# Patient Record
Sex: MALE | Race: WHITE | Employment: OTHER | URBAN - METROPOLITAN AREA
[De-identification: names, ages, dates, MRNs, and addresses within clinical notes are randomized per-mention and may not be internally consistent; named-entity substitution may affect disease eponyms.]

---

## 2017-02-10 ENCOUNTER — INITIAL CONSULT (OUTPATIENT)
Dept: SURGERY | Age: 82
End: 2017-02-10

## 2017-02-10 VITALS — HEIGHT: 71 IN | SYSTOLIC BLOOD PRESSURE: 188 MMHG | HEART RATE: 98 BPM | DIASTOLIC BLOOD PRESSURE: 120 MMHG

## 2017-02-10 DIAGNOSIS — Z86.73 HISTORY OF STROKE: ICD-10-CM

## 2017-02-10 DIAGNOSIS — I10 ESSENTIAL HYPERTENSION: ICD-10-CM

## 2017-02-10 DIAGNOSIS — R60.0 BILATERAL LEG EDEMA: Primary | ICD-10-CM

## 2017-02-10 DIAGNOSIS — E11.51 DIABETES MELLITUS WITH PERIPHERAL CIRCULATORY DISORDER (HCC): ICD-10-CM

## 2017-02-10 PROCEDURE — MISCLOD MISC IP SERVICE NONBILLABLE: Performed by: SURGERY

## 2017-02-17 ENCOUNTER — OFFICE VISIT (OUTPATIENT)
Dept: SURGERY | Age: 82
End: 2017-02-17

## 2017-02-17 ENCOUNTER — PROCEDURE VISIT (OUTPATIENT)
Dept: SURGERY | Age: 82
End: 2017-02-17

## 2017-02-17 VITALS — SYSTOLIC BLOOD PRESSURE: 144 MMHG | DIASTOLIC BLOOD PRESSURE: 80 MMHG

## 2017-02-17 DIAGNOSIS — I87.2 VENOUS INSUFFICIENCY (CHRONIC) (PERIPHERAL): Primary | ICD-10-CM

## 2017-02-17 DIAGNOSIS — E11.51 DIABETES MELLITUS WITH PERIPHERAL CIRCULATORY DISORDER (HCC): Primary | ICD-10-CM

## 2017-02-17 DIAGNOSIS — R60.0 LOCALIZED EDEMA: ICD-10-CM

## 2017-02-17 DIAGNOSIS — I10 ESSENTIAL HYPERTENSION: ICD-10-CM

## 2017-02-17 DIAGNOSIS — E11.51 DIABETES MELLITUS WITH PERIPHERAL CIRCULATORY DISORDER (HCC): ICD-10-CM

## 2017-02-17 PROCEDURE — 99213 OFFICE O/P EST LOW 20 MIN: CPT | Performed by: SURGERY

## 2017-02-17 PROCEDURE — 93970 EXTREMITY STUDY: CPT | Performed by: SURGERY

## 2017-02-17 PROCEDURE — 93925 LOWER EXTREMITY STUDY: CPT | Performed by: SURGERY

## 2017-02-17 ASSESSMENT — ENCOUNTER SYMPTOMS
GASTROINTESTINAL NEGATIVE: 1
RESPIRATORY NEGATIVE: 1

## 2017-04-10 PROBLEM — J44.9 COPD (CHRONIC OBSTRUCTIVE PULMONARY DISEASE) (HCC): Status: ACTIVE | Noted: 2017-04-10

## 2017-04-10 PROBLEM — E16.2 HYPOGLYCEMIA: Status: ACTIVE | Noted: 2017-04-10

## 2017-04-26 ENCOUNTER — OFFICE VISIT (OUTPATIENT)
Dept: INTERNAL MEDICINE CLINIC | Age: 82
End: 2017-04-26

## 2017-04-26 DIAGNOSIS — E11.41 CONTROLLED TYPE 2 DIABETES MELLITUS WITH DIABETIC MONONEUROPATHY, UNSPECIFIED LONG TERM INSULIN USE STATUS: ICD-10-CM

## 2017-04-26 DIAGNOSIS — R33.9 URINARY RETENTION: ICD-10-CM

## 2017-04-26 DIAGNOSIS — J43.9 PULMONARY EMPHYSEMA, UNSPECIFIED EMPHYSEMA TYPE (HCC): ICD-10-CM

## 2017-04-26 DIAGNOSIS — T83.510A URINARY TRACT INFECTION ASSOCIATED WITH CYSTOSTOMY CATHETER, INITIAL ENCOUNTER (HCC): ICD-10-CM

## 2017-04-26 DIAGNOSIS — N39.0 URINARY TRACT INFECTION ASSOCIATED WITH CYSTOSTOMY CATHETER, INITIAL ENCOUNTER (HCC): ICD-10-CM

## 2017-04-26 DIAGNOSIS — E16.2 HYPOGLYCEMIA: Primary | ICD-10-CM

## 2017-04-26 PROCEDURE — 99306 1ST NF CARE HIGH MDM 50: CPT | Performed by: INTERNAL MEDICINE

## 2017-05-17 ASSESSMENT — ENCOUNTER SYMPTOMS
RESPIRATORY NEGATIVE: 1
GASTROINTESTINAL NEGATIVE: 1
EYES NEGATIVE: 1
ALLERGIC/IMMUNOLOGIC NEGATIVE: 1

## 2018-09-19 ENCOUNTER — HOSPITAL ENCOUNTER (INPATIENT)
Dept: INPATIENT UNIT | Age: 83
LOS: 5 days | Discharge: NURSING, REHAB OR LONG-TERM CARE | DRG: 698 | End: 2018-09-24
Attending: EMERGENCY MEDICINE | Admitting: FAMILY MEDICINE
Payer: MEDICARE

## 2018-09-19 DIAGNOSIS — E86.0 DEHYDRATION: ICD-10-CM

## 2018-09-19 DIAGNOSIS — R41.0 DELIRIUM: ICD-10-CM

## 2018-09-19 DIAGNOSIS — N39.0 COMPLICATED UTI (URINARY TRACT INFECTION): Primary | ICD-10-CM

## 2018-09-19 DIAGNOSIS — J18.9 PNEUMONIA DUE TO ORGANISM: ICD-10-CM

## 2018-09-19 DIAGNOSIS — R50.9 FEVER, UNSPECIFIED FEVER CAUSE: ICD-10-CM

## 2018-09-19 PROBLEM — M00.9 SEPTIC ARTHRITIS (HCC): Status: ACTIVE | Noted: 2018-09-19

## 2018-09-19 LAB
A/G RATIO: 0.7 (ref 1.1–2.2)
ALBUMIN SERPL-MCNC: 2.5 G/DL (ref 3.4–5)
ALP BLD-CCNC: 150 U/L (ref 40–129)
ALT SERPL-CCNC: 53 U/L (ref 10–40)
ANION GAP SERPL CALCULATED.3IONS-SCNC: 8 MMOL/L (ref 3–16)
AST SERPL-CCNC: 62 U/L (ref 15–37)
BACTERIA: ABNORMAL /HPF
BASE EXCESS VENOUS: 6.2 MMOL/L
BASOPHILS ABSOLUTE: 0 K/UL (ref 0–0.2)
BASOPHILS RELATIVE PERCENT: 0 %
BETA-HYDROXYBUTYRATE: 0.24 MMOL/L (ref 0–0.27)
BILIRUB SERPL-MCNC: 0.8 MG/DL (ref 0–1)
BILIRUBIN URINE: NEGATIVE
BLOOD, URINE: ABNORMAL
BUN BLDV-MCNC: 32 MG/DL (ref 7–20)
CALCIUM SERPL-MCNC: 9.7 MG/DL (ref 8.3–10.6)
CARBOXYHEMOGLOBIN: 2.7 %
CHLORIDE BLD-SCNC: 95 MMOL/L (ref 99–110)
CLARITY: ABNORMAL
CO2: 30 MMOL/L (ref 21–32)
COLOR: YELLOW
COMMENT UA: ABNORMAL
CREAT SERPL-MCNC: 0.8 MG/DL (ref 0.8–1.3)
EOSINOPHILS ABSOLUTE: 0 K/UL (ref 0–0.6)
EOSINOPHILS RELATIVE PERCENT: 0 %
EPITHELIAL CELLS, UA: 0 /HPF (ref 0–5)
GFR AFRICAN AMERICAN: >60
GFR NON-AFRICAN AMERICAN: >60
GLOBULIN: 3.8 G/DL
GLUCOSE BLD-MCNC: 224 MG/DL (ref 70–99)
GLUCOSE BLD-MCNC: 247 MG/DL (ref 70–99)
GLUCOSE BLD-MCNC: 248 MG/DL (ref 70–99)
GLUCOSE BLD-MCNC: 268 MG/DL (ref 70–99)
GLUCOSE URINE: NEGATIVE MG/DL
HCO3 VENOUS: 31 MMOL/L (ref 23–29)
HCT VFR BLD CALC: 35 % (ref 40.5–52.5)
HEMOGLOBIN: 11.4 G/DL (ref 13.5–17.5)
HYALINE CASTS: 9 /LPF (ref 0–8)
KETONES, URINE: NEGATIVE MG/DL
LACTIC ACID: 1.1 MMOL/L (ref 0.4–2)
LEUKOCYTE ESTERASE, URINE: ABNORMAL
LYMPHOCYTES ABSOLUTE: 1.2 K/UL (ref 1–5.1)
LYMPHOCYTES RELATIVE PERCENT: 7 %
MCH RBC QN AUTO: 29.9 PG (ref 26–34)
MCHC RBC AUTO-ENTMCNC: 32.6 G/DL (ref 31–36)
MCV RBC AUTO: 91.6 FL (ref 80–100)
METHEMOGLOBIN VENOUS: 0.5 %
MICROSCOPIC EXAMINATION: YES
MONOCYTES ABSOLUTE: 0.7 K/UL (ref 0–1.3)
MONOCYTES RELATIVE PERCENT: 4 %
NEUTROPHILS ABSOLUTE: 15.5 K/UL (ref 1.7–7.7)
NEUTROPHILS RELATIVE PERCENT: 89 %
NITRITE, URINE: POSITIVE
O2 CONTENT, VEN: 16 ML/DL
O2 SAT, VEN: 99 %
O2 THERAPY: ABNORMAL
PCO2, VEN: 47.5 MMHG (ref 40–50)
PDW BLD-RTO: 14.8 % (ref 12.4–15.4)
PERFORMED ON: ABNORMAL
PH UA: 5.5
PH VENOUS: 7.43 (ref 7.35–7.45)
PLATELET # BLD: 286 K/UL (ref 135–450)
PLATELET SLIDE REVIEW: ADEQUATE
PMV BLD AUTO: 8.2 FL (ref 5–10.5)
PO2, VEN: 116 MMHG
POTASSIUM SERPL-SCNC: 4.3 MMOL/L (ref 3.5–5.1)
PROTEIN UA: 30 MG/DL
RBC # BLD: 3.81 M/UL (ref 4.2–5.9)
RBC # BLD: NORMAL 10*6/UL
RBC UA: 13 /HPF (ref 0–4)
SLIDE REVIEW: ABNORMAL
SODIUM BLD-SCNC: 133 MMOL/L (ref 136–145)
SPECIFIC GRAVITY UA: 1.02
TCO2 CALC VENOUS: 32 MMOL/L
TOTAL PROTEIN: 6.3 G/DL (ref 6.4–8.2)
URINE REFLEX TO CULTURE: YES
URINE TYPE: ABNORMAL
UROBILINOGEN, URINE: 1 E.U./DL
WBC # BLD: 17.4 K/UL (ref 4–11)
WBC UA: 176 /HPF (ref 0–5)

## 2018-09-19 PROCEDURE — G8979 MOBILITY GOAL STATUS: HCPCS

## 2018-09-19 PROCEDURE — 99285 EMERGENCY DEPT VISIT HI MDM: CPT

## 2018-09-19 PROCEDURE — 80053 COMPREHEN METABOLIC PANEL: CPT

## 2018-09-19 PROCEDURE — G8987 SELF CARE CURRENT STATUS: HCPCS

## 2018-09-19 PROCEDURE — 96365 THER/PROPH/DIAG IV INF INIT: CPT

## 2018-09-19 PROCEDURE — 85025 COMPLETE CBC W/AUTO DIFF WBC: CPT

## 2018-09-19 PROCEDURE — 87186 SC STD MICRODIL/AGAR DIL: CPT

## 2018-09-19 PROCEDURE — 96367 TX/PROPH/DG ADDL SEQ IV INF: CPT

## 2018-09-19 PROCEDURE — 97162 PT EVAL MOD COMPLEX 30 MIN: CPT

## 2018-09-19 PROCEDURE — 87086 URINE CULTURE/COLONY COUNT: CPT

## 2018-09-19 PROCEDURE — 97530 THERAPEUTIC ACTIVITIES: CPT

## 2018-09-19 PROCEDURE — 87801 DETECT AGNT MULT DNA AMPLI: CPT

## 2018-09-19 PROCEDURE — 97166 OT EVAL MOD COMPLEX 45 MIN: CPT

## 2018-09-19 PROCEDURE — G8978 MOBILITY CURRENT STATUS: HCPCS

## 2018-09-19 PROCEDURE — 82010 KETONE BODYS QUAN: CPT

## 2018-09-19 PROCEDURE — 83605 ASSAY OF LACTIC ACID: CPT

## 2018-09-19 PROCEDURE — 87040 BLOOD CULTURE FOR BACTERIA: CPT

## 2018-09-19 PROCEDURE — 9990 CHARGE CONVERSION

## 2018-09-19 PROCEDURE — 96361 HYDRATE IV INFUSION ADD-ON: CPT

## 2018-09-19 PROCEDURE — G8988 SELF CARE GOAL STATUS: HCPCS

## 2018-09-19 PROCEDURE — 97535 SELF CARE MNGMENT TRAINING: CPT

## 2018-09-19 PROCEDURE — 93010 ELECTROCARDIOGRAM REPORT: CPT | Performed by: INTERNAL MEDICINE

## 2018-09-19 PROCEDURE — 82803 BLOOD GASES ANY COMBINATION: CPT

## 2018-09-19 PROCEDURE — 81001 URINALYSIS AUTO W/SCOPE: CPT

## 2018-09-19 PROCEDURE — 70450 CT HEAD/BRAIN W/O DYE: CPT

## 2018-09-19 PROCEDURE — 87077 CULTURE AEROBIC IDENTIFY: CPT

## 2018-09-19 PROCEDURE — 93005 ELECTROCARDIOGRAM TRACING: CPT

## 2018-09-19 PROCEDURE — 71045 X-RAY EXAM CHEST 1 VIEW: CPT

## 2018-09-19 RX ORDER — NICOTINE POLACRILEX 4 MG
15 LOZENGE BUCCAL PRN
Status: DISCONTINUED | OUTPATIENT
Start: 2018-09-19 | End: 2018-09-24 | Stop reason: HOSPADM

## 2018-09-19 RX ORDER — OXYBUTYNIN CHLORIDE 5 MG/1
5 TABLET ORAL 3 TIMES DAILY
Status: DISCONTINUED | OUTPATIENT
Start: 2018-09-19 | End: 2018-09-24 | Stop reason: HOSPADM

## 2018-09-19 RX ORDER — POLYETHYLENE GLYCOL 3350 17 G/17G
17 POWDER, FOR SOLUTION ORAL DAILY
COMMUNITY

## 2018-09-19 RX ORDER — FUROSEMIDE 20 MG/1
20 TABLET ORAL DAILY
Status: DISCONTINUED | OUTPATIENT
Start: 2018-09-19 | End: 2018-09-24 | Stop reason: HOSPADM

## 2018-09-19 RX ORDER — 0.9 % SODIUM CHLORIDE 0.9 %
1000 INTRAVENOUS SOLUTION INTRAVENOUS ONCE
Status: COMPLETED | OUTPATIENT
Start: 2018-09-19 | End: 2018-09-19

## 2018-09-19 RX ORDER — LEVOTHYROXINE SODIUM 0.07 MG/1
75 TABLET ORAL DAILY
Status: DISCONTINUED | OUTPATIENT
Start: 2018-09-19 | End: 2018-09-24 | Stop reason: HOSPADM

## 2018-09-19 RX ORDER — ALBUTEROL SULFATE 2.5 MG/3ML
2.5 SOLUTION RESPIRATORY (INHALATION) EVERY 4 HOURS PRN
Status: DISCONTINUED | OUTPATIENT
Start: 2018-09-19 | End: 2018-09-24 | Stop reason: HOSPADM

## 2018-09-19 RX ORDER — ACETAMINOPHEN 325 MG/1
650 TABLET ORAL EVERY 4 HOURS PRN
Status: DISCONTINUED | OUTPATIENT
Start: 2018-09-19 | End: 2018-09-24 | Stop reason: HOSPADM

## 2018-09-19 RX ORDER — POLYETHYLENE GLYCOL 3350 17 G/17G
17 POWDER, FOR SOLUTION ORAL DAILY
Status: DISCONTINUED | OUTPATIENT
Start: 2018-09-19 | End: 2018-09-19

## 2018-09-19 RX ORDER — POTASSIUM CHLORIDE 1.5 G/1.77G
20 POWDER, FOR SOLUTION ORAL DAILY
Status: DISCONTINUED | OUTPATIENT
Start: 2018-09-19 | End: 2018-09-24 | Stop reason: HOSPADM

## 2018-09-19 RX ORDER — DEXTROSE MONOHYDRATE 25 G/50ML
12.5 INJECTION, SOLUTION INTRAVENOUS PRN
Status: DISCONTINUED | OUTPATIENT
Start: 2018-09-19 | End: 2018-09-24 | Stop reason: HOSPADM

## 2018-09-19 RX ORDER — INSULIN GLARGINE 100 [IU]/ML
25 INJECTION, SOLUTION SUBCUTANEOUS NIGHTLY
Status: DISCONTINUED | OUTPATIENT
Start: 2018-09-19 | End: 2018-09-24 | Stop reason: HOSPADM

## 2018-09-19 RX ORDER — DEXTROSE MONOHYDRATE 50 MG/ML
100 INJECTION, SOLUTION INTRAVENOUS PRN
Status: DISCONTINUED | OUTPATIENT
Start: 2018-09-19 | End: 2018-09-24 | Stop reason: HOSPADM

## 2018-09-19 RX ORDER — ASPIRIN 81 MG/1
81 TABLET, CHEWABLE ORAL DAILY
Status: DISCONTINUED | OUTPATIENT
Start: 2018-09-19 | End: 2018-09-24 | Stop reason: HOSPADM

## 2018-09-19 RX ORDER — SIMVASTATIN 10 MG
10 TABLET ORAL NIGHTLY
Status: DISCONTINUED | OUTPATIENT
Start: 2018-09-19 | End: 2018-09-24 | Stop reason: HOSPADM

## 2018-09-19 RX ORDER — SODIUM CHLORIDE 0.9 % (FLUSH) 0.9 %
10 SYRINGE (ML) INJECTION EVERY 12 HOURS SCHEDULED
Status: DISCONTINUED | OUTPATIENT
Start: 2018-09-19 | End: 2018-09-24 | Stop reason: HOSPADM

## 2018-09-19 RX ORDER — POLYETHYLENE GLYCOL 3350 17 G/17G
17 POWDER, FOR SOLUTION ORAL DAILY
Status: DISCONTINUED | OUTPATIENT
Start: 2018-09-19 | End: 2018-09-24 | Stop reason: HOSPADM

## 2018-09-19 RX ORDER — SODIUM CHLORIDE 0.9 % (FLUSH) 0.9 %
10 SYRINGE (ML) INJECTION PRN
Status: DISCONTINUED | OUTPATIENT
Start: 2018-09-19 | End: 2018-09-23 | Stop reason: SDUPTHER

## 2018-09-19 RX ORDER — ACETAMINOPHEN 650 MG/1
650 SUPPOSITORY RECTAL ONCE
Status: COMPLETED | OUTPATIENT
Start: 2018-09-19 | End: 2018-09-19

## 2018-09-19 RX ORDER — ONDANSETRON 2 MG/ML
4 INJECTION INTRAMUSCULAR; INTRAVENOUS EVERY 6 HOURS PRN
Status: DISCONTINUED | OUTPATIENT
Start: 2018-09-19 | End: 2018-09-24 | Stop reason: HOSPADM

## 2018-09-19 RX ORDER — LISINOPRIL 10 MG/1
10 TABLET ORAL DAILY
Status: DISCONTINUED | OUTPATIENT
Start: 2018-09-19 | End: 2018-09-24 | Stop reason: HOSPADM

## 2018-09-19 RX ADMIN — ENOXAPARIN SODIUM 40 MG: 40 INJECTION SUBCUTANEOUS at 17:31

## 2018-09-19 RX ADMIN — SIMVASTATIN 10 MG: 10 TABLET, FILM COATED ORAL at 22:19

## 2018-09-19 RX ADMIN — INSULIN LISPRO 4 UNITS: 100 INJECTION, SOLUTION INTRAVENOUS; SUBCUTANEOUS at 17:48

## 2018-09-19 RX ADMIN — OXYBUTYNIN CHLORIDE 5 MG: 5 TABLET ORAL at 22:20

## 2018-09-19 RX ADMIN — ACETAMINOPHEN 650 MG: 650 SUPPOSITORY RECTAL at 11:00

## 2018-09-19 RX ADMIN — Medication 10 ML: at 22:23

## 2018-09-19 RX ADMIN — OXYBUTYNIN CHLORIDE 5 MG: 5 TABLET ORAL at 17:32

## 2018-09-19 RX ADMIN — Medication 1000 ML: at 11:00

## 2018-09-19 RX ADMIN — Medication 1000 ML: at 12:50

## 2018-09-19 RX ADMIN — FUROSEMIDE 20 MG: 20 TABLET ORAL at 17:33

## 2018-09-19 RX ADMIN — ASPIRIN 81 MG 81 MG: 81 TABLET ORAL at 17:32

## 2018-09-19 RX ADMIN — POTASSIUM CHLORIDE 20 MEQ: 1.5 POWDER, FOR SOLUTION ORAL at 17:32

## 2018-09-19 RX ADMIN — INSULIN LISPRO 2 UNITS: 100 INJECTION, SOLUTION INTRAVENOUS; SUBCUTANEOUS at 22:21

## 2018-09-19 RX ADMIN — LISINOPRIL 10 MG: 10 TABLET ORAL at 17:32

## 2018-09-19 RX ADMIN — LEVOTHYROXINE SODIUM 75 MCG: 75 TABLET ORAL at 17:32

## 2018-09-19 RX ADMIN — INSULIN GLARGINE 25 UNITS: 100 INJECTION, SOLUTION SUBCUTANEOUS at 22:20

## 2018-09-19 RX ADMIN — POLYETHYLENE GLYCOL 3350 17 G: 17 POWDER, FOR SOLUTION ORAL at 17:31

## 2018-09-19 ASSESSMENT — PAIN SCALES - GENERAL: PAINLEVEL_OUTOF10: 0

## 2018-09-19 NOTE — PROGRESS NOTES
Medication Reconciliation     List of medications patient is currently taking is complete. Source of information: 1. Conversation with family at bedside                                       2. EPIC records      Allergies  Pcn [penicillins]; Sulfa antibiotics; and Ceftriaxone     Notes regarding home medications:   1. Patient received 4 units of his Humalog this morning with breakfast.     2. Patient has not had any of his other medications yet today.     Ruth Mccrary, 2019 PharmD Candidate  9/19/2018 11:49 AM

## 2018-09-19 NOTE — ED NOTES
Bed: B-10  Expected date: 9/19/18  Expected time: 10:20 AM  Means of arrival: [facility]  Comments:  Emmanuel De La Fuente, RN  09/19/18 1028

## 2018-09-19 NOTE — PAYOR INFORMATION
Patient Barbara Delgado:  [de-identified]  Primary AUTH/CERT:    [address] [facility]  Drive Name:   [facility]  Primary Insurance Plan Name:  [facility] [facility] HMO/POS  Primary Insurance Group Number:  [id number]  Primary Insurance Plan Type: [employer] Insurance Policy Number:  [id number]

## 2018-09-19 NOTE — PROGRESS NOTES
obstructive pulmonary disease) ([facility] [address] Diabetes mellitus ([facility] [address] ESBL (extended spectrum beta-lactamase) producing bacteria infection; Hyperlipidemia; Hypertension; Pneumonia; Schizophrenia ([facility] [address] Thyroid disease; and Urine retention. has a past surgical history that includes Prostatectomy; pr incise/drain bladder (Cystoscopy, placement suprapubic catheter); and Tonsillectomy. Restrictions  Restrictions/Precautions  Restrictions/Precautions: Fall Risk  Position Activity Restriction  Other position/activity restrictions: 6 recent falls; usually falls backward    Subjective  General  Chart Reviewed: Yes  Additional Pertinent Hx: Roiht Hunt PA-C, 9/19, \"Pt  is a 80 y.o. male who presents to the emergency department With his daughter for fever and altered mental status. The patient's daughter reports over the past week the patient has been declining. He has been generally weak and has had 3 falls. He did hit his head 3 days ago. There was no loss of consciousness. Suffered a hematoma to head which she has been applying ice. She has found his sugars to be in the 300s and at one point was greater than 600. She states he has had intermittent confusion. She has been trying to get him to come to the hospital and he reluctantly agreed today. He has a suprapubic catheter in place due to BPH and this was due for a change on 9/26. His daughter is concerned that his urine is dark and thick. He was recently treated for UTI a month ago, urine grew ESBL, he was treated with Levaquin at that time. He has not followed up with urology since then. \"  PMH includes anemia, BPH, cerebral infarction, COPD, DM, Schizophrenia, suprapubic catheter. Response To Previous Treatment: Not applicable  Family / Caregiver Present: Yes  Referring Practitioner: Dr. Scooby Davis  Referral Date : 09/19/18  Diagnosis: Septic arthritis  Subjective  Subjective: Pt very [place]. Agreeable to therapy evaluation. Daughter present.   Pain Screening  Patient Currently in Pain: Denies  Vital Signs  Patient Currently in Pain: Denies     Orientation  Orientation  Overall Orientation Status: Impaired  Orientation Level: Disoriented to time;Oriented to person    Social/Functional History  Social/Functional History  Lives With: Spouse  Type of Home: House  Home Layout: Two level, Performs ADL's on one level, Able to Live on Main level with bedroom/bathroom (Stair lift)  Home Access: Level entry  Bathroom Shower/Tub: Tub/Shower unit (With door; small step in)  Bathroom Toilet: Handicap height  Bathroom Equipment: Hand-held shower, Built-in shower seat  Bathroom Accessibility: Walker accessible  Home Equipment: Wheelchair-manual, Roll About, Rolling walker, Alert Button (1 walker on each level)  ADL Assistance:  (HHA assists with dressing as needed. Pt receives bath 1x/wk.  )  Homemaking Assistance:  (Pt fixes his own meals.  )  Ambulation Assistance:  (Mod I with RW over household distances)  Transfer Assistance: Independent  Active : No  Mode of Transportation: Family  Additional Comments: HHA comes 5 days a week. Daughter is with pt every day. Pt is alone with wife during nights. Pt has falleln twice in past week; 4 other falls recently.      Objective    AROM RLE (degrees)  RLE AROM: WFL  RLE General AROM: HIp Flex, Knee Flex/Ext, and Ankle PF/DF WFL  AROM LLE (degrees)  LLE AROM : WFL  LLE General AROM: HIp Flex, Knee Flex/Ext, and Ankle PF/DF WFL  AROM RUE (degrees)  RUE AROM : Exceptions  RUE General AROM: Shoulder Flex 90 (prior CVA), Elbow Flex/Ext WFL  AROM LUE (degrees)  LUE AROM : WFL  LUE General AROM: Shoulder Flex, Elbow Flex/Ext WFL     Strength RLE  Strength RLE: Exception  Comment: Hip Flex at least 3/5, Knee Ext 3/5, Ankle DF 3/5  Strength LLE  Strength LLE: WFL  Comment: Hip Flex, Knee Ext, and ANkle DF grossly 3+/5  Strength RUE  Strength RUE: WFL  Strength LUE  Strength LUE: WFL     Tone RLE  RLE Tone: Normotonic  Tone LLE  LLE Tone: Normotonic  Motor Control  Gross Motor?: WFL  Sensation  Overall Sensation Status: WFL     Bed mobility  Supine to Sit: Maximum assistance (HOB elevated; Max A for trunk control)  Sit to Supine: Maximum assistance (Max a x 2 for LE and trunk control)  Scooting: Moderate assistance    Transfers  Sit to Stand: Minimal Assistance (Min A to RW with elevated EOB)  Stand to sit: Minimal Assistance (Cues to sit (would not initially bend hips/knees))     Ambulation  Ambulation?: Yes  Ambulation 1  Surface: level tile  Device: Rolling Walker  Assistance: Minimal assistance  Quality of Gait: Pt required Min A initially to correct posterior LOB. After several steps, with cues to lean forward, he demonstrated improved trunk control, maintaining balance briefly with CGA. Pt became progressively fatigued, becoming less responsive to commands, then requiring Min A from PT to re-initiate forward ambulation, and control RW. Distance: 20'  Comments: Limited d/t fatigue     Balance  Comments: Pt able to maintain sitting balance at EOB with SBA. He denied dizziness or fatigue initially. Assessment   Body structures, Functions, Activity limitations: Decreased functional mobility ; Decreased strength;Decreased balance;Decreased endurance;Decreased safe awareness  Assessment: Pt is a 80 y.o. M. admitted 9/19 for UTI, multiple falls at home. He is very [facility], and disoriented to time, but agreeable to therapy evaluation. He demonstrated moderately decreased RUE/RLE strength (hx of CVA), and required Max A for bed mobility, and Min A to stand from elevated EOB to RW. He was limited in standing tolerance d/t fatigue, requiring Min A and significant cueing to navigate short distance in room with RW support. He would benefit from continued therapy to improve his strength, endurance, and independence with all mobility tasks. Recommend continued therapy at low-moderate frequency prior to pt returning home.     Treatment

## 2018-09-19 NOTE — H&P
merrem and azithromycin started in the ED, will continue  - check blood and sputum cultures  - lactate was normal  - supplemental O2, Nebs PRN  - mucinex     UTI, with a chronic suprapubic catheter  - UCx sent, previous with ESBL  - merrem started in the ED, will continue  - gentle IVF    Sepsis, POA  - with criteria: leukocytosis, fevers,  Tachypnea, AMS; source is PNA, UTI  - cultures, Abx as above  - IVF  - recheck lactate Q 6hrs until normalizes    Acute metabolic encephalopathy  - due to infectious causes, pna, uti  - head CT negative  - treat associated conditions  - avoid sedating meds as able  - supportive care    Diabetes  - stable  - hba1c   - hold home PO meds  - SSI, accuchecks    DVT Prophylaxis: lovenox  Diet:  dental soft  Code Status: DNR-CCA    Dispo - in pt       Mary Steven DO    Thank you Reinier Gaspar MD for the opportunity to be involved in this patient's care. If you have any questions or concerns please feel free to contact me at [phone].

## 2018-09-19 NOTE — ED NOTES
Report to everardo mueller.  Pt to be transferred to 2680 with transport     Maria G Ulloa RN  09/19/18 1237

## 2018-09-19 NOTE — PROGRESS NOTES
Occupational Therapy   Occupational Therapy Initial Assessment  See last progress note for discharge status. Date: 2018   Patient Name: Samantha Apgar  MRN: [id number]     : 1924    Date of Service: 2018    Discharge Recommendations:  Patient would benefit from continued therapy after discharge since he is below his normal level of balance, mobility and self care. Patient Diagnosis(es): The primary encounter diagnosis was Complicated UTI (urinary tract infection). Diagnoses of Pneumonia due to organism, Fever, unspecified fever cause, Delirium, and Dehydration were also pertinent to this visit. has a past medical history of Anemia; Arthritis; Benign prostate hyperplasia; Cerebral artery occlusion with cerebral infarction ([facility] [address] Chronic kidney disease; COPD (chronic obstructive pulmonary disease) ([facility] [address] Diabetes mellitus ([facility] [address] ESBL (extended spectrum beta-lactamase) producing bacteria infection; Hyperlipidemia; Hypertension; Pneumonia; Schizophrenia ([facility] [address] Thyroid disease; and Urine retention. has a past surgical history that includes Prostatectomy; pr incise/drain bladder (Cystoscopy, placement suprapubic catheter); and Tonsillectomy. Restrictions  Restrictions/Precautions  Restrictions/Precautions: Fall Risk  Position Activity Restriction  Other position/activity restrictions: 6 recent falls; usually falls backward    Subjective   General  Chart Reviewed:  Yes  Additional Pertinent Hx: DM, schizophrenia  Family / Caregiver Present: Yes (daughter, Adal Bradley)  Diagnosis: admitted 18 with falls, AMS, UTI and pneumonia  Subjective  Subjective: \"I'm OK\"  General Comment  Comments: Pt in bed, agreeable to therapy  Pain Assessment  Patient Currently in Pain: Denies    Height and Weight  Height: 5' 11.5\" (181.6 cm)  Social/Functional History  Social/Functional History  Lives With: Spouse  Type of Home: House  Home Layout: Two level, Performs ADL's on one level, Able to Live on Main level with bedroom/bathroom (Stair lift)  Home Access: Level entry  Bathroom Shower/Tub: Tub/Shower unit (With door; small step in)  Bathroom Toilet: Handicap height  Bathroom Equipment: Hand-held shower, Built-in shower seat  Bathroom Accessibility: Walker accessible  Home Equipment: Wheelchair-manual, Roll About, Rolling walker, Alert Button (1 walker on each level)  ADL Assistance:  (HHA assists with dressing as needed. Pt receives bath 1x/wk.  )  Homemaking Assistance:  (Pt fixes his own meals.  )  Ambulation Assistance:  (Mod I with RW over household distances)  Transfer Assistance: Independent  Active : No  Mode of Transportation: Family  Additional Comments: HHA comes 5 days a week. Daughter is with pt every day. Pt is alone with wife during nights. Pt has falleln twice in past week; 4 other falls recently. Objective   Vision: Within Functional Limits  Hearing: Exceptions to [facility]  Hearing Exceptions: Bilateral hearing aid    Orientation  Overall Orientation Status: Impaired  Orientation Level: Disoriented to time;Oriented to person     Standing Balance  Sit to stand: Minimal assistance (with height of bed, elevated)  Stand to sit: Minimal assistance  ADL  Feeding: Minimal assistance  Grooming: Moderate assistance  UE Dressing: Moderate assistance;Maximum assistance  LE Dressing: Maximum assistance  Toileting:  (supra pubic catheter)        Bed mobility  Supine to Sit: Maximum assistance  Sit to Supine: Maximum assistance  Transfers  Stand Step Transfers: Minimal assistance (with rolling walker, for around end of bed and back to other side of bed)  Sit to stand: Minimal assistance (with height of bed, elevated)  Stand to sit: Minimal assistance  Transfer Comments: Pt had difficulty following commands during some of the mobility. Comprehension seemed varied.      Cognition  Overall Cognitive Status: Exceptions  Arousal/Alertness: Delayed responses to stimuli;Inconsistent responses to stimuli  Following Commands: Follows one step commands with repetition  Attention Span: Attends with cues to redirect  Initiation: Requires cues for some  Sequencing: Requires cues for some        Sensation  Overall Sensation Status: WFL        LUE AROM (degrees)  LUE AROM : WFL  RUE AROM (degrees)  RUE AROM : WFL  RUE General AROM: except shoulder flexion to 90 degrees only (since old CVA)                 Assessment   Performance deficits / Impairments: Decreased functional mobility ; Decreased ADL status; Decreased cognition  Prognosis: Fair;Good  Decision Making: Medium Complexity  History: hx of CVA, DM, ESBL, suprapubic catheter, now with falls, UTI, PNA  Exam: self care, transfers  Assistance / Modification: assist, cues and walker/ equipment  REQUIRES OT FOLLOW UP: Yes  Activity Tolerance  Activity Tolerance: Patient limited by fatigue;Treatment limited secondary to medical complications (free text)  Safety Devices  Safety Devices in place: Yes  Type of devices: Nurse notified; Left in bed;Bed alarm in place;Call light within reach         Plan   Plan  Times per week: 3-5x  Current Treatment Recommendations: Patient/Caregiver Education & Training, Self-Care / ADL, Functional Mobility Training, Safety Education & Training    G-Code  OT G-codes  Functional Limitation: Self care  Self Care Current Status (): At least 60 percent but less than 80 percent impaired, limited or restricted  Self Care Goal Status (): At least 40 percent but less than 60 percent impaired, limited or restricted  OutComes Score  Eric Roach scored a 13/24 on the AM-PAC ADL Inpatient form. Current research shows that an AM-PAC score of 17 or less is typically not associated with a discharge to the patient's home setting. Based on the patients AM-PAC score and their current ADL deficits, it is recommended that the patient have 3-5 sessions per week of Occupational Therapy at d/c to increase the patients independence. AM-PAC Score        AM-PAC Inpatient Daily Activity Raw Score: 13  AM-PAC Inpatient ADL T-Scale Score : 32.03  ADL Inpatient CMS 0-100% Score: 63.03  ADL Inpatient CMS G-Code Modifier : CL    Goals  Short term goals  Time Frame for Short term goals: at d/c:  Short term goal 1: Set up for grooming  Short term goal 2: Minimal assist for bathing and dressing  Short term goal 3: SBA for bed mobility  Short term goal 4: SBA for transfers  Patient Goals   Patient goals : \"To eat\"       Therapy Time   Individual Concurrent Group Co-treatment   Time In 2406         Time Out 1613         Minutes 58               Patient Education: Call for needs and for assist to get up.     Rebecca Alexandre

## 2018-09-20 LAB
ANION GAP SERPL CALCULATED.3IONS-SCNC: 9 MMOL/L (ref 3–16)
BUN BLDV-MCNC: 23 MG/DL (ref 7–20)
CALCIUM SERPL-MCNC: 9.1 MG/DL (ref 8.3–10.6)
CHLORIDE BLD-SCNC: 103 MMOL/L (ref 99–110)
CO2: 29 MMOL/L (ref 21–32)
CREAT SERPL-MCNC: 0.7 MG/DL (ref 0.8–1.3)
GFR AFRICAN AMERICAN: >60
GFR NON-AFRICAN AMERICAN: >60
GLUCOSE BLD-MCNC: 109 MG/DL (ref 70–99)
GLUCOSE BLD-MCNC: 133 MG/DL (ref 70–99)
GLUCOSE BLD-MCNC: 168 MG/DL (ref 70–99)
GLUCOSE BLD-MCNC: 185 MG/DL (ref 70–99)
GLUCOSE BLD-MCNC: 196 MG/DL (ref 70–99)
HCT VFR BLD CALC: 32.9 % (ref 40.5–52.5)
HEMOGLOBIN: 10.8 G/DL (ref 13.5–17.5)
MAGNESIUM: 1.9 MG/DL (ref 1.8–2.4)
MCH RBC QN AUTO: 29.8 PG (ref 26–34)
MCHC RBC AUTO-ENTMCNC: 32.8 G/DL (ref 31–36)
MCV RBC AUTO: 91 FL (ref 80–100)
PDW BLD-RTO: 14.6 % (ref 12.4–15.4)
PERFORMED ON: ABNORMAL
PLATELET # BLD: 279 K/UL (ref 135–450)
PMV BLD AUTO: 7.6 FL (ref 5–10.5)
POTASSIUM REFLEX MAGNESIUM: 3.7 MMOL/L (ref 3.5–5.1)
RBC # BLD: 3.61 M/UL (ref 4.2–5.9)
REPORT: NORMAL
REPORT: NORMAL
SODIUM BLD-SCNC: 141 MMOL/L (ref 136–145)
WBC # BLD: 14.8 K/UL (ref 4–11)

## 2018-09-20 PROCEDURE — G8988 SELF CARE GOAL STATUS: HCPCS

## 2018-09-20 PROCEDURE — G8987 SELF CARE CURRENT STATUS: HCPCS

## 2018-09-20 PROCEDURE — 97110 THERAPEUTIC EXERCISES: CPT

## 2018-09-20 PROCEDURE — 99223 1ST HOSP IP/OBS HIGH 75: CPT | Performed by: INTERNAL MEDICINE

## 2018-09-20 PROCEDURE — 97116 GAIT TRAINING THERAPY: CPT

## 2018-09-20 PROCEDURE — 83735 ASSAY OF MAGNESIUM: CPT

## 2018-09-20 PROCEDURE — 87040 BLOOD CULTURE FOR BACTERIA: CPT

## 2018-09-20 PROCEDURE — 36415 COLL VENOUS BLD VENIPUNCTURE: CPT

## 2018-09-20 PROCEDURE — G8979 MOBILITY GOAL STATUS: HCPCS

## 2018-09-20 PROCEDURE — 9990 CHARGE CONVERSION

## 2018-09-20 PROCEDURE — 80048 BASIC METABOLIC PNL TOTAL CA: CPT

## 2018-09-20 PROCEDURE — 97535 SELF CARE MNGMENT TRAINING: CPT

## 2018-09-20 PROCEDURE — 92610 EVALUATE SWALLOWING FUNCTION: CPT

## 2018-09-20 PROCEDURE — G8978 MOBILITY CURRENT STATUS: HCPCS

## 2018-09-20 PROCEDURE — G8997 SWALLOW GOAL STATUS: HCPCS

## 2018-09-20 PROCEDURE — 85027 COMPLETE CBC AUTOMATED: CPT

## 2018-09-20 PROCEDURE — 94760 N-INVAS EAR/PLS OXIMETRY 1: CPT

## 2018-09-20 PROCEDURE — 97530 THERAPEUTIC ACTIVITIES: CPT

## 2018-09-20 PROCEDURE — G8996 SWALLOW CURRENT STATUS: HCPCS

## 2018-09-20 RX ADMIN — SIMVASTATIN 10 MG: 10 TABLET, FILM COATED ORAL at 20:33

## 2018-09-20 RX ADMIN — LEVOTHYROXINE SODIUM 75 MCG: 75 TABLET ORAL at 08:33

## 2018-09-20 RX ADMIN — INSULIN LISPRO 2 UNITS: 100 INJECTION, SOLUTION INTRAVENOUS; SUBCUTANEOUS at 11:59

## 2018-09-20 RX ADMIN — OXYBUTYNIN CHLORIDE 5 MG: 5 TABLET ORAL at 14:11

## 2018-09-20 RX ADMIN — OXYBUTYNIN CHLORIDE 5 MG: 5 TABLET ORAL at 09:04

## 2018-09-20 RX ADMIN — POTASSIUM CHLORIDE 20 MEQ: 1.5 POWDER, FOR SOLUTION ORAL at 09:04

## 2018-09-20 RX ADMIN — Medication 10 ML: at 20:53

## 2018-09-20 RX ADMIN — ENOXAPARIN SODIUM 40 MG: 40 INJECTION SUBCUTANEOUS at 09:04

## 2018-09-20 RX ADMIN — OXYBUTYNIN CHLORIDE 5 MG: 5 TABLET ORAL at 20:33

## 2018-09-20 RX ADMIN — INSULIN GLARGINE 25 UNITS: 100 INJECTION, SOLUTION SUBCUTANEOUS at 20:47

## 2018-09-20 RX ADMIN — Medication 10 ML: at 09:10

## 2018-09-20 RX ADMIN — ASPIRIN 81 MG 81 MG: 81 TABLET ORAL at 09:04

## 2018-09-20 RX ADMIN — POLYETHYLENE GLYCOL 3350 17 G: 17 POWDER, FOR SOLUTION ORAL at 09:04

## 2018-09-20 RX ADMIN — INSULIN LISPRO 1 UNITS: 100 INJECTION, SOLUTION INTRAVENOUS; SUBCUTANEOUS at 20:46

## 2018-09-20 RX ADMIN — MEROPENEM 500 MG: 500 INJECTION, POWDER, FOR SOLUTION INTRAVENOUS at 20:35

## 2018-09-20 RX ADMIN — INSULIN LISPRO 2 UNITS: 100 INJECTION, SOLUTION INTRAVENOUS; SUBCUTANEOUS at 18:46

## 2018-09-20 RX ADMIN — LISINOPRIL 10 MG: 10 TABLET ORAL at 09:04

## 2018-09-20 RX ADMIN — FUROSEMIDE 20 MG: 20 TABLET ORAL at 09:04

## 2018-09-20 ASSESSMENT — PAIN SCALES - GENERAL
PAINLEVEL_OUTOF10: 0

## 2018-09-20 NOTE — PLAN OF CARE
Problem: Falls - Risk of:  Goal: Absence of physical injury  Absence of physical injury     Outcome: Ongoing  Fall risk assessment completed every shift. All precautions in place. Pt has call light within reach at all times. Room clear of clutter. Tele cam in use to promote safety. Problem: Confusion - Acute:  Goal: Absence of continued neurological deterioration signs and symptoms    Absence of continued neurological deterioration signs and symptoms   Outcome: Ongoing  Tele cam in place to promote safety while acutely confused. Mental status assessed throughout the shift. Sleep promoted throughout the night by turning down lights, turning off TV, and limiting noise. Problem: Injury - Risk of, Physical Injury:  Goal: Absence of physical injury  Absence of physical injury     Outcome: Ongoing  Fall risk assessment completed every shift. All precautions in place. Pt has call light within reach at all times. Room clear of clutter. Tele cam in use to promote safety. Problem: Airway Clearance - Ineffective:  Goal: Clear lung sounds  Clear lung sounds  Outcome: Ongoing  Lungs being assessed each shift. Pt encouraged to C&DB throughout the shift. Fluid intake encouraged to liquify any secretions. Problem: Urinary Elimination:  Goal: Signs and symptoms of infection will decrease  Signs and symptoms of infection will decrease  Outcome: Ongoing  Suprapubic catheter care completed each shift. Antibiotics administered as prescribed. Urine characteristics monitored throughout the shift.

## 2018-09-20 NOTE — PROGRESS NOTES
cueing for safe hand placement, mod A with grab bars      Transfers  Sit to stand: Minimal assistance (with height of bed, elevated Min A x2)  Stand to sit: Minimal assistance  Transfer Comments: Pt then practiced several sit <> stand from recliner chair with min Ax1 to RW. Cognition  Overall Cognitive Status: Exceptions  Arousal/Alertness: Delayed responses to stimuli;Inconsistent responses to stimuli  Following Commands: Follows one step commands with repetition  Attention Span: Attends with cues to redirect  Initiation: Requires cues for some  Sequencing: Requires cues for some           Type of ROM/Therapeutic Exercise  Comment: Pt did complete x3 sit <> stands to maximize strength and independence for transfers. Assessment   Performance deficits / Impairments: Decreased functional mobility ; Decreased ADL status; Decreased cognition  Assessment: Pt tolerated session well this date, motivated to complete several walks in room. Pt completed toileting with min A for thoroughness of post pericare. Pt completed grooming at sink - tolerating ~ 4 minuts of standing activity. Pt is completing functional mobility with CGA with RW, steady with no LOB. Pt with one instance of postior LOB with min A to correct. Pt initially requiring min A x2 to stand from EOB, progressed to min A x1. Pt would benefit from continued skilled therapy to maximize strength and independence. Pt is unsafe to return home at this time d/t level of assistance currently required. Treatment Diagnosis: Decreased functional mobility ; Decreased ADL status; Decreased cognition;  Prognosis: Fair;Good  Exam: self care, transfers  Assistance / Modification: assist, cues and walker/ equipment  Patient Education: ADL retraining, safe transfers, func mob  REQUIRES OT FOLLOW UP: Yes  Activity Tolerance  Activity Tolerance: Patient Tolerated treatment well  Safety Devices  Safety Devices in place: Yes (RN (Denise) aware)  Type of devices: Call

## 2018-09-20 NOTE — CONSULTS
Infectious Diseases Inpatient Consult Note      Reason for Consult: Sepsis, high fevers     Requesting Physician:  Dr. Georgie Calderon     Primary Care Physician:  Elis Aguilar MD    History Obtained From:  Medical records and family    CHIEF COMPLAINT:     Chief Complaint   Patient presents with    Altered Mental Status     febrile. suprapubic catheter in place. dark urine noted in bag. unknown amt of time. daughter to ED, sts pt lives at home. 2 falls this week. reports lost balance. hit head 3 days ago. no loc        HISTORY OF PRESENT ILLNESS:  80 y.o. man lives in assisted living facility and has suprapubic catheter and h/o Urine infections in the past admitted with falls x 2 more confusion, leg swelling and dark urine and fevers . He has SPC change in ED and noted to have some drainage from the exit site UA very abnormal urine cx E coli and Blood cx E coli and coagulase negative staph, fever 102 on admit with elevation in WBC. H/o ESBL urine infection in the past. We are consulted for abx recommendations. Son at bed side has provided details.         Past Medical History:    Past Medical History:   Diagnosis Date    Anemia     Arthritis     Benign prostate hyperplasia     [facility]     Chronic kidney disease     COPD (chronic obstructive [facility] disease) [facility]     Diabetes mellitus ([facility]     II    ESBL (extended spectrum beta-lactamase) producing bacteria infection 08/12/2018    urine    Hyperlipidemia     Hypertension     Pneumonia     Schizophrenia ([facility]     Thyroid disease     hypo    Urine retention        Past Surgical History:    Past Surgical History:   Procedure Laterality Date    OH INCISE/DRAIN BLADDER  Cystoscopy, placement suprapubic catheter    Cystostomy, Suprapubic    PROSTATECTOMY      TONSILLECTOMY         Current Medications:    Outpatient Prescriptions Marked as Taking for the 9/19/18 encounter [facility] Encounter) with Mary Steven, DO   Medication Sig Dispense Refill    polyethylene glycol (GLYCOLAX) powder Take 17 g by mouth daily      insulin glargine (LANTUS) 100 UNIT/ML injection pen Inject 25 Units into the skin nightly 5 Pen 3    linagliptin (TRADJENTA) 5 MG tablet Take 1 tablet by mouth daily 30 tablet 0    furosemide (LASIX) 20 MG tablet Take 20 mg by mouth daily      insulin lispro (HUMALOG KWIKPEN) 100 UNIT/ML pen **Medium Dose Corrective Algorithm**  Glucose: Dose:  If <139             No Insulin  140-199 2 Units  200-249 4 Units  250-299 6 Units  300-349 8 Units  350-400 10 Units  Above 400       12 Units 5 Pen 3    temazepam (RESTORIL) 15 MG capsule Take 2 capsules by mouth nightly as needed for Sleep 30 capsule 5    lisinopril (PRINIVIL;ZESTRIL) 10 MG tablet Take 1 tablet by mouth daily 30 tablet 3    oxybutynin (DITROPAN) 5 MG tablet Take 5 mg by mouth 3 times daily      potassium chloride 20 MEQ/15ML (10%) oral solution Take 20 mEq by mouth daily      simvastatin (ZOCOR) 10 MG tablet Take 10 mg by mouth nightly      aspirin 81 MG chewable tablet Take 1 tablet by mouth daily. 30 tablet 0    levothyroxine (SYNTHROID) 75 MCG tablet Take 75 mcg by mouth Daily. Allergies:  Pcn [penicillins]; Sulfa antibiotics; and Ceftriaxone    Immunizations :   Immunization History   Administered Date(s) Administered    Influenza Virus Vaccine 11/10/2015         Social History:   Social History   Substance Use Topics    Smoking status: Former Smoker     Types: Cigarettes    Smokeless tobacco: Not on file    Alcohol use No     History   Smoking Status    Former Smoker    Types: Cigarettes   Smokeless Tobacco    Not on file          Family History : no DVT  no COPD       REVIEW OF SYSTEMS:    No fever / chills / sweats. No weight loss. No visual change, eye pain, eye discharge. No oral lesion, sore throat, dysphagia.   Denies cough / sputum/Sob   Denies chest pain, palpitations/ dizziness  Denies nausea/ MICRO: cultures reviewed and updated by me     20/18 1109       Order Status: Sent Specimen: Blood Updated: 09/20/18 1115   Culture Blood #1 [[id number]] Collected: 09/20/18 0956   Order Status: Sent Specimen: Blood from Blood Updated: 09/20/18 1003   Urine Culture [[id number]] (Abnormal) Collected: 09/19/18 1053   Order Status: Completed Specimen: Urine, clean catch Updated: 09/20/18 0909    Urine Culture, Routine --    Organism Gram negative ameena (A)    Urine Culture, Routine --    >100,000 CFU/ml   ID and sensitivity to follow    Narrative:     ORDER#: [id number]                          ORDERED BY: Mayito Eid  SOURCE: Urine Clean Catch                  COLLECTED:  09/19/18 10:53  ANTIBIOTICS AT DE. :                      RECEIVED :  09/19/18 11:11  Performed at:  [facility]  [address]   Phone [phone]   Culture, Blood PCR Report [[id number]] Collected: 09/19/18 1051   Order Status: Completed Updated: 09/20/18 0818    Report SEE IMAGE   Narrative:     Aaron Aguirre [id number],  Microbiology results called to and read back by Luis Swann RN [email or web address],  09/20/2018 08:17, by Thuy Coombs  Previous panic on this admission - call not needed per SOP, 09/20/2018 02:21,  by [facility]  Referred out by:  [facility]  [address] [facility] [employer] [facility] [address]   Phone [phone]   Culture Blood #2 [[id number]] (Abnormal) Collected: 09/19/18 1051   Order Status: Completed Specimen: Blood Whole Citrate Updated: 09/20/18 0817    Culture, Blood 2 -- (A)    Gram stain Anaerobic bottle:   Gram negative rods   Information to follow   Gram stain Aerobic bottle:   Gram positive cocci in clusters   resembling Staphylococcus   Information to follow     Organism Staphylococcus coagulase negative DNA Detected (A)    Culture, Blood 2 See additional report for complete BCID panel.    Narrative:     ORDER#: [id number]                          ORDERED BY: EILEEN LUTHER  SOURCE: Blood                              COLLECTED:  09/19/18 10:51  ANTIBIOTICS AT DE. :                      RECEIVED :  09/19/18 11:21  CALL  [facility]  [id number] tel. [phone],  Microbiology results called to and read back by Bogdan Pratt RN [email or web address],  09/20/2018 08:17, by Loco Stewart  Previous panic on this admission - call not needed per SOP, 09/20/2018 02:21,  by [facility]  If child <=2 yrs old please draw pediatric bottle. ~Blood Culture #2  Performed at:  [facility]  [address] [place] [address] [place], [place] [address]   Phone [phone]   Culture, Blood PCR Report [[id number]] Collected: 09/19/18 1053   Order Status: Completed Updated: 09/20/18 0221    Report SEE IMAGE   Narrative:     Sandra Mclainmarco a [id number],  Microbiology results called to and read back by Massimo Sheriff,  09/20/2018 02:20, by [facility]  Microbiology results called to and read back by CLARA Reyes, 09/20/2018  02:19, by [facility]  Referred out by:  [facility]  [address] [place], [place] [address]   Phone [phone]   Culture Blood #1 [[id number]] (Abnormal) Collected: 09/19/18 1053   Order Status: Completed Specimen: Blood from Blood Whole Citrate Updated: 09/20/18 0220    Blood Culture, Routine -- (A)    Gram stain Aerobic bottle:   Gram negative rods   Information to follow     Organism Escherichia coli DNA Detected (A)    Blood Culture, Routine See additional report for complete BCID panel. Narrative:     ORDER#: [id number]                          ORDERED BY: EILEEN Hernandez  SOURCE: Blood                              COLLECTED:  09/19/18 10:53  ANTIBIOTICS AT DE. :                      RECEIVED :  09/19/18 11:02  CALL  [facility]  [id number] tel. [phone],  Microbiology results called to and read back by Rx Rupa Sheriff,  09/20/2018 02:20, by [facility]  Microbiology results called to and read back by CLARA Reyes, 09/20/2018  02:19, by Susie Hoang  If child <=2 yrs old please draw pediatric bottle. ~Blood Culture #1  Performed at:  [facility]  [address] [facility] [address] [address]   Phone [phone]   Respiratory Culture [[id number]]    Order Status: No result Specimen: Sputum Induced        Urine Culture  Recent Labs      09/19/18   1053   LABURIN  >100,000 CFU/ml  ID and sensitivity to follow       Imaging:   CT Head WO Contrast   Final Result   No acute intracranial abnormality. XR CHEST PORTABLE   Final Result   Increased bibasilar airspace opacities which may be infectious. Consider   follow-up imaging to resolution. All pertinent images and reports for the current Hospitalization were reviewed by me. IMPRESSION:   Sepsis  Fevers,chills , confusion from above    source  E coli on urine cx   E coli bacteremia  Coagulase negative staph on Blood cx is a contaminant  Supra pubic catheter in place  Falls from ? Infection and sepsis  Dementia  Prostate enlargement    H/oUrinary retention    H/o MDRO - ESBL in the past       Labs, Microbiology, Radiology and pertinent results from current hospitalization and care every where were reviewed by me as a part of the consultation. PLAN :  1. Cont IV Meropenem reduce the dose to x 500 mg Q 8 hrs  2. D/C Azithromycin   3. Repeat Blood cx pending  4. Await full cx results   5. Cont supportive care      Discussed with patient/Family d/w RN d/w Son at bed side      Thanks for allowing me to participate in your patient's care please call me with any questions or concerns.     Dr. Micky Younger MD  [address] [place] [address] Physician  Phone: [phone]   Fax : [phone]

## 2018-09-20 NOTE — PROGRESS NOTES
this morning, but demonstrated improved independence ambulating short distance with RW support, CGA. He was unsteady while performing grooming tasks at sink, requiring intermittent Min A to correct for posterior LOB. He remains unsafe to return home d/t decreased balance and strength. Continue to recommend low-moderate frequency therapy upon D/C. Treatment Diagnosis: Decreased functional mobility; Decreased endurance  Specific instructions for Next Treatment: Practiced bed mobility and transfers  Prognosis: Fair;Good  Activity Tolerance  Activity Tolerance: Patient Tolerated treatment well;Patient limited by fatigue;Patient limited by endurance     G-Code  PT G-Codes  Functional Assessment Tool Used: [facility] Mobility Inpatient  Score: 16  Functional Limitation: Mobility: Walking and moving around  Mobility: Walking and Moving Around Current Status (): At least 40 percent but less than 60 percent impaired, limited or restricted  Mobility: Walking and Moving Around Goal Status (): At least 20 percent but less than 40 percent impaired, limited or restricted    AM-PAC Score  AM-PAC Inpatient Mobility Raw Score : 16  AM-PAC Inpatient T-Scale Score : 40.78  Mobility Inpatient CMS 0-100% Score: 54.16  Mobility Inpatient CMS G-Code Modifier : CK          Goals  Short term goals  Time Frame for Short term goals: In 2-3 days pt will perform - all goals ongoing 9/20  Short term goal 1: Bed mobility Mod A  Short term goal 2: Transfers from [address] chair with CGA  Short term goal 3: Ambulate 22' with CGA, RW support  Long term goals  Time Frame for Long term goals : LTG = STG  Patient Goals   Patient goals : \"To be able to eat again. \"    Plan    Plan  Times per week: 3-5x  Specific instructions for Next Treatment: Practiced bed mobility and transfers  Current Treatment Recommendations: Strengthening, Balance Training, Functional Mobility Training, Transfer Training, Gait Training, ADL/Self-care Training, Neuromuscular Re-education, Home Exercise Program, Safety Education & Training, Patient/Caregiver Education & Training, Equipment Evaluation, Education, & procurement  Safety Devices  Type of devices:  All fall risk precautions in place, Call light within reach, Chair alarm in place, Gait belt, Left in chair  Restraints  Initially in place: No     Therapy Time   Individual Concurrent Group Co-treatment   Time In       1030   Time Out       1100   Minutes       30   Timed Code Treatment Minutes: [address] [facility] Emily PT    Electronically signed by Venkata Pacheco, PT [id number] on 9/20/2018 at 11:15 AM

## 2018-09-20 NOTE — PROGRESS NOTES
metabolic encephalopathy associated with sepsis,UTI-- improved  -Diabetes mellitus type 2- controlled-on SSI      Plan  - consult ID to manage antibiotics. . Currently on meropenem and azithromycin  -Follow-up blood cultures, repeat cultures to document clearance  -Continue IV fluids  - swallowing evaluation      DVT Prophylaxis:  Lovenox  Diet: DIET CARB CONTROL;  Dental Soft  Code Status: DNR-CCA    PT/OT Eval Status:  ongoing    Dispo -   consulted- may need ECF    Nitin Jarvis MD

## 2018-09-20 NOTE — PROGRESS NOTES
mouth.       Impression  Dysphagia Diagnosis: Moderate oral stage dysphagia; Moderate pharyngeal stage dysphagia   · The pt was alert and upright in chair. Pt was confused. Dysarthric speech with R labial/lingual weakness. Son reports old CVA  · The pt demonstrated limited mastication with soft solids- pt appeared to swallow bites mostly whole. · On all other consistencies, the pt demonstrated prolonged bolus holding and reduced bolus control with suspected premature spillage. Reduced initiation for swallow. Delayed swallow. Reduced laryngeal elevation. Pt required tactile and verbal cues intermittently for swallow initiation. · No overt s/s following any consistencies. REC downgrade to DDI/puree and thin liquids. SLP for dysphagia tx and tolerance     Recommended Diet and Intervention  Diet Solids Recommendation: Dysphagia I Pureed  Liquid Consistency Recommendation: Thin  Recommended Form of Meds: Meds in puree  Therapeutic Interventions: Diet tolerance monitoring; Therapeutic PO trials with SLP;Patient/Family education    Compensatory Swallowing Strategies  Compensatory Swallowing Strategies: Eat/Feed slowly;Upright as possible for all oral intake;Small bites/sips;Assist feed     Treatment Plan  Requires SLP Intervention: Yes  Duration/Frequency of Treatment: 3-5x week on acute care floor  D/C Recommendations: To be determined  Dysphagia Outcome Severity Scale: Level 3: Moderate dysphagia- Total assisstance, supervision or strategies. Two or more diet consistencies restricted     Treatment/Goals  Dysphagia Goals: The patient will tolerate recommended diet without observed clinical signs of aspiration; The patient/caregiver will demonstrate understanding of compensatory strategies for improved swallowing safety. ;The patient will tolerate mechanical soft foods 10/10. General  Chart Reviewed: Yes  Behavior/Cognition: Alert;Confused; Cooperative; Requires cueing;Agitated;Distractible (slightly

## 2018-09-21 PROBLEM — E44.0 MODERATE MALNUTRITION (HCC): Status: ACTIVE | Noted: 2018-09-21

## 2018-09-21 LAB
ANION GAP SERPL CALCULATED.3IONS-SCNC: 8 MMOL/L (ref 3–16)
BASOPHILS ABSOLUTE: 0 K/UL (ref 0–0.2)
BASOPHILS RELATIVE PERCENT: 0.2 %
BUN BLDV-MCNC: 22 MG/DL (ref 7–20)
CALCIUM SERPL-MCNC: 9.2 MG/DL (ref 8.3–10.6)
CHLORIDE BLD-SCNC: 99 MMOL/L (ref 99–110)
CO2: 31 MMOL/L (ref 21–32)
CREAT SERPL-MCNC: 0.7 MG/DL (ref 0.8–1.3)
EKG ATRIAL RATE: 99 BPM
EKG DIAGNOSIS: NORMAL
EKG P AXIS: 54 DEGREES
EKG P-R INTERVAL: 162 MS
EKG Q-T INTERVAL: 324 MS
EKG QRS DURATION: 84 MS
EKG QTC CALCULATION (BAZETT): 415 MS
EKG R AXIS: 7 DEGREES
EKG T AXIS: 18 DEGREES
EKG VENTRICULAR RATE: 99 BPM
EOSINOPHILS ABSOLUTE: 0.2 K/UL (ref 0–0.6)
EOSINOPHILS RELATIVE PERCENT: 1.3 %
GFR AFRICAN AMERICAN: >60
GFR NON-AFRICAN AMERICAN: >60
GLUCOSE BLD-MCNC: 114 MG/DL (ref 70–99)
GLUCOSE BLD-MCNC: 159 MG/DL (ref 70–99)
GLUCOSE BLD-MCNC: 214 MG/DL (ref 70–99)
GLUCOSE BLD-MCNC: 217 MG/DL (ref 70–99)
GLUCOSE BLD-MCNC: 92 MG/DL (ref 70–99)
HCT VFR BLD CALC: 34.4 % (ref 40.5–52.5)
HEMOGLOBIN: 11.3 G/DL (ref 13.5–17.5)
LYMPHOCYTES ABSOLUTE: 1 K/UL (ref 1–5.1)
LYMPHOCYTES RELATIVE PERCENT: 6.9 %
MCH RBC QN AUTO: 30.4 PG (ref 26–34)
MCHC RBC AUTO-ENTMCNC: 32.8 G/DL (ref 31–36)
MCV RBC AUTO: 92.5 FL (ref 80–100)
MONOCYTES ABSOLUTE: 1.4 K/UL (ref 0–1.3)
MONOCYTES RELATIVE PERCENT: 9.9 %
NEUTROPHILS ABSOLUTE: 11.2 K/UL (ref 1.7–7.7)
NEUTROPHILS RELATIVE PERCENT: 81.7 %
ORGANISM: ABNORMAL
PDW BLD-RTO: 14.9 % (ref 12.4–15.4)
PERFORMED ON: ABNORMAL
PERFORMED ON: NORMAL
PLATELET # BLD: 319 K/UL (ref 135–450)
PMV BLD AUTO: 7.8 FL (ref 5–10.5)
POTASSIUM REFLEX MAGNESIUM: 3.8 MMOL/L (ref 3.5–5.1)
RBC # BLD: 3.72 M/UL (ref 4.2–5.9)
SODIUM BLD-SCNC: 138 MMOL/L (ref 136–145)
URINE CULTURE, ROUTINE: ABNORMAL
URINE CULTURE, ROUTINE: ABNORMAL
WBC # BLD: 13.7 K/UL (ref 4–11)

## 2018-09-21 PROCEDURE — G8979 MOBILITY GOAL STATUS: HCPCS

## 2018-09-21 PROCEDURE — 92526 ORAL FUNCTION THERAPY: CPT

## 2018-09-21 PROCEDURE — 94760 N-INVAS EAR/PLS OXIMETRY 1: CPT

## 2018-09-21 PROCEDURE — 85025 COMPLETE CBC W/AUTO DIFF WBC: CPT

## 2018-09-21 PROCEDURE — 97530 THERAPEUTIC ACTIVITIES: CPT

## 2018-09-21 PROCEDURE — 99233 SBSQ HOSP IP/OBS HIGH 50: CPT | Performed by: INTERNAL MEDICINE

## 2018-09-21 PROCEDURE — 9990 CHARGE CONVERSION

## 2018-09-21 PROCEDURE — 36415 COLL VENOUS BLD VENIPUNCTURE: CPT

## 2018-09-21 PROCEDURE — G8978 MOBILITY CURRENT STATUS: HCPCS

## 2018-09-21 PROCEDURE — 80048 BASIC METABOLIC PNL TOTAL CA: CPT

## 2018-09-21 RX ADMIN — OXYBUTYNIN CHLORIDE 5 MG: 5 TABLET ORAL at 09:11

## 2018-09-21 RX ADMIN — MEROPENEM 500 MG: 500 INJECTION, POWDER, FOR SOLUTION INTRAVENOUS at 21:45

## 2018-09-21 RX ADMIN — SIMVASTATIN 10 MG: 10 TABLET, FILM COATED ORAL at 21:45

## 2018-09-21 RX ADMIN — OXYBUTYNIN CHLORIDE 5 MG: 5 TABLET ORAL at 21:45

## 2018-09-21 RX ADMIN — LISINOPRIL 10 MG: 10 TABLET ORAL at 09:11

## 2018-09-21 RX ADMIN — POTASSIUM CHLORIDE 20 MEQ: 1.5 POWDER, FOR SOLUTION ORAL at 09:11

## 2018-09-21 RX ADMIN — ENOXAPARIN SODIUM 40 MG: 40 INJECTION SUBCUTANEOUS at 09:11

## 2018-09-21 RX ADMIN — LEVOTHYROXINE SODIUM 75 MCG: 75 TABLET ORAL at 06:03

## 2018-09-21 RX ADMIN — MEROPENEM 500 MG: 500 INJECTION, POWDER, FOR SOLUTION INTRAVENOUS at 04:36

## 2018-09-21 RX ADMIN — OXYBUTYNIN CHLORIDE 5 MG: 5 TABLET ORAL at 12:26

## 2018-09-21 RX ADMIN — ASPIRIN 81 MG 81 MG: 81 TABLET ORAL at 09:11

## 2018-09-21 RX ADMIN — INSULIN LISPRO 4 UNITS: 100 INJECTION, SOLUTION INTRAVENOUS; SUBCUTANEOUS at 17:20

## 2018-09-21 RX ADMIN — Medication 10 ML: at 09:12

## 2018-09-21 RX ADMIN — MEROPENEM 500 MG: 500 INJECTION, POWDER, FOR SOLUTION INTRAVENOUS at 11:39

## 2018-09-21 RX ADMIN — INSULIN LISPRO 2 UNITS: 100 INJECTION, SOLUTION INTRAVENOUS; SUBCUTANEOUS at 21:50

## 2018-09-21 RX ADMIN — Medication 10 ML: at 21:45

## 2018-09-21 RX ADMIN — INSULIN GLARGINE 25 UNITS: 100 INJECTION, SOLUTION SUBCUTANEOUS at 21:50

## 2018-09-21 RX ADMIN — FUROSEMIDE 20 MG: 20 TABLET ORAL at 09:11

## 2018-09-21 ASSESSMENT — PAIN SCALES - GENERAL
PAINLEVEL_OUTOF10: 0
PAINLEVEL_OUTOF10: 0

## 2018-09-21 NOTE — CARE COORDINATION
Jose Vargas prefers [facility] [place].  left for Olga at [facility] regarding bed availability. Spoke with [place] at [place]. [facility]. Will check benefits. One bed available. Will continue to follow for discharge planning.      Electronically signed by SHAZIA Correa, [email or web address] on 9/21/2018 at 1:05 PM

## 2018-09-21 NOTE — CONSULTS
Nutrition Assessment    Type and Reason for Visit: Initial, Consult (supplement recommendations)    Nutrition Recommendations:   Continue glucerna tid    Malnutrition Assessment:  · Malnutrition Status: Meets the criteria for moderate malnutrition  · Context: Acute illness or injury  · Findings of the 6 clinical characteristics of malnutrition (Minimum of 2 out of 6 clinical characteristics is required to make the diagnosis of moderate or severe Protein Calorie Malnutrition based on AND/ASPEN Guidelines):  1. Energy Intake-Less than or equal to 75%, greater than or equal to 5 days    2. Weight Loss-5% loss or greater, in 1 month  3. Fat Loss-Unable to assess,    4. Muscle Loss-Unable to assess,    5. Fluid Accumulation-Moderate to severe fluid accumulation, Extremities  6.  Strength-Not measured    Nutrition Diagnosis:   · Problem: Inadequate oral intake  · Etiology: related to Insufficient energy/nutrient consumption     Signs and symptoms:  as evidenced by Weight loss, Diet history of poor intake    Nutrition Assessment:  · Subjective Assessment: Pt admitted with altered mental status, fever & increasing weakness. Found to have uti with sepsis. PMH includes; suprapubic catheter, CVA, [place], CKD, COPD, DM, HLD, HTN, Schizophrenia. Diet advanced to [facility]. Swallowing evaluated on 9/20 & pt found safest with Dysphagia l texture. Glucerna added tid to start r/t reports of decreased po. Pt not available & no family present, so information taken from medical record. Po records reflect variable but improving intake at 1-75%. Noted current plan is for ECF at discharge. · Nutrition-Focused Physical Findings: BM 9/21. Glycolax on board. Pt -2.5 liters. Noted +2 edema to lower extremities.   · Wound Type: Skin Tears (right forearm. )  · Current Nutrition Therapies:  · Oral Diet Orders: Carb Control 4 Carbs/Meal, Dysphagia 1 (Pureed)   · Oral Diet intake: 1-25%, 51-75%  · Oral Nutrition Supplement (ONS) Orders: Diabetic

## 2018-09-21 NOTE — PROGRESS NOTES
Physical Therapy  Progress Note  Pt agreeable to afternoon session. He initially required Max a to stand from bedside chair to RW. Once standing, pt ambulated very slowly with RW support, requiring intermittent cueing for navigation, and to re-initiate ambulation, but did well to maintain balance with CGA-Min A. Pt took seated rest after 30' of ambulation, then required Min A to stand from bedside chair to RW. He pivoted to bed with Min A, then required Mod A to move to supine (LE control). With cues for technique, Min A for LE stabilization, and bed in trendellenberg position, pt able to scoot himself toward [facility]. He was positioned for comfort with HOB elevated, alarm in place, call light in reach. Time In: 1355  Time coded Tx: 24 min.     Electronically signed by Abad James, PT [id number] on 9/21/2018 at 2:30 PM

## 2018-09-21 NOTE — PROGRESS NOTES
Physical Therapy  Facility/Department: [address] PROGRESSIVE  Daily Treatment Note  This note serves as D/C summary if patient is discharged prior to next treatment session. Assessment: Pt required Max A x 2 to move supine > sit, but was able to stand from elevated EOB with Min A, then pivot to bedside chair with CGA. His ability to complete mobility tasks independently remains limited d/t LE weakness. He would benefit from continued therapy to improve his strength, and activity tolerance. Continue to recommend low-moderate frequency therapy upon D/C. Maryjo Mallory scored a 16/24 on the AM-PAC short mobility form. Current research shows that an AM-PAC score of 17 or less is typically not associated with a discharge to the patient's home setting. Based on the patients AM-PAC score and their current functional mobility deficits, it is recommended that the patient have 3-5 sessions per week of Physical Therapy at d/c to increase the patients independence. NAME: Maryjo Mallory  : 1924  MRN: [id number]    Date of Service: 2018    Discharge Recommendations:  Patient would benefit from continued therapy after discharge, Continue to assess pending progress   PT Equipment Recommendations  Equipment Needed: No    Patient Diagnosis(es): The primary encounter diagnosis was Complicated UTI (urinary tract infection). Diagnoses of Pneumonia due to organism, Fever, unspecified fever cause, Delirium, and Dehydration were also pertinent to this visit. has a past medical history of Anemia; Arthritis; Benign prostate hyperplasia; Cerebral artery occlusion with cerebral infarction ([address] Chronic kidney disease; COPD (chronic obstructive pulmonary disease) ([address] Diabetes mellitus ([address] ESBL (extended spectrum beta-lactamase) producing bacteria infection; Hyperlipidemia; Hypertension; Pneumonia; Schizophrenia ([address] Thyroid disease; and Urine retention.    has a past surgical history that includes Treatment: Practiced bed mobility and transfers  Prognosis: Fair;Good  Activity Tolerance  Activity Tolerance: Patient Tolerated treatment well;Patient limited by fatigue;Patient limited by endurance     G-Code  PT G-Codes  Functional Assessment Tool Used: [facility] mobility Inpatient  Score: 15  Functional Limitation: Mobility: Walking and moving around  Mobility: Walking and Moving Around Current Status (): At least 40 percent but less than 60 percent impaired, limited or restricted  Mobility: Walking and Moving Around Goal Status (): At least 20 percent but less than 40 percent impaired, limited or restricted    AM-PAC Score  AM-PAC Inpatient Mobility Raw Score : 16  AM-PAC Inpatient T-Scale Score : 40.78  Mobility Inpatient CMS 0-100% Score: 54.16  Mobility Inpatient CMS G-Code Modifier : CK          Goals  Short term goals  Time Frame for Short term goals: In 2-3 days pt will perform - all goals ongoing 9/20  Short term goal 1: Bed mobility Mod A  Short term goal 2: Transfers from [address] chair with CGA  Short term goal 3: Ambulate 22' with CGA, RW support  Long term goals  Time Frame for Long term goals : LTG = STG  Patient Goals   Patient goals : \"To be able to eat again. \"    Plan    Plan  Times per week: 3-5x  Specific instructions for Next Treatment: Practiced bed mobility and transfers  Current Treatment Recommendations: Strengthening, Balance Training, Functional Mobility Training, Transfer Training, Gait Training, ADL/Self-care Training, Neuromuscular Re-education, Home Exercise Program, Safety Education & Training, Patient/Caregiver Education & Training, Equipment Evaluation, Education, & procurement  Safety Devices  Type of devices:  All fall risk precautions in place, Call light within reach, Chair alarm in place, Gait belt, Left in chair  Restraints  Initially in place: No     Therapy Time   Individual Concurrent Group Co-treatment   Time In       1035   Time Out       1050   Minutes

## 2018-09-21 NOTE — PROGRESS NOTES
Component Value Date    ALKPHOS 150 09/19/2018    ALT 53 09/19/2018    AST 62 09/19/2018    PROT 6.3 09/19/2018    PROT 7.4 12/25/2012    BILITOT 0.8 09/19/2018    BILIDIR 0.3 04/09/2017    IBILI 0.9 04/09/2017    LABALBU 2.5 09/19/2018       UA:  Lab Results   Component Value Date    COLORU YELLOW 09/19/2018    CLARITYU CLOUDY 09/19/2018    GLUCOSEU Negative 09/19/2018    BILIRUBINUR Negative 09/19/2018    KETUA Negative 09/19/2018    SPECGRAV 1.017 09/19/2018    BLOODU LARGE 09/19/2018    PHUR 5.5 09/19/2018    PROTEINU 30 09/19/2018    UROBILINOGEN 1.0 09/19/2018    NITRU POSITIVE 09/19/2018    LEUKOCYTESUR LARGE 09/19/2018    LABMICR YES 09/19/2018    URINETYPE Not Specified 09/19/2018      Urine Microscopic:   Lab Results   Component Value Date    LABCAST 0-1 Hyaline 04/09/2017    BACTERIA 4+ 09/19/2018    COMU see below 09/19/2018    HYALCAST 9 09/19/2018    WBCUA 176 09/19/2018    RBCUA 13 09/19/2018    EPIU 0 09/19/2018       MICRO: cultures reviewed and updated by me          Culture Blood #2 [[id number]] (Abnormal) Collected: 09/19/18 1051   Order Status: Completed Specimen: Blood Whole Citrate Updated: 09/21/18 0947    Culture, Blood 2 -- (A)    Gram stain Anaerobic bottle:   Gram negative rods   Information to follow   Gram stain Aerobic bottle:   Gram positive cocci in clusters   resembling Staphylococcus   Information to follow     Organism Staphylococcus coagulase negative DNA Detected (A)    Culture, Blood 2 See additional report for complete BCID panel. Organism Escherichia coli (A)    Culture, Blood 2 --    POSITIVE for   No further workup   Isolated one out of two bottles   Refer to ([id number]) for sensitivity results     Organism Staphylococcus coagulase-negative (A)    Culture, Blood 2 --    POSITIVE for   This organism was isolated from one bottle only. Susceptibility testing is not routinely done as this   organism frequently represents skin contamination.    Additional testing can be catheter in place  Falls from ? Infection and sepsis  Dementia  Prostate enlargement    H/o Urinary retention    H/o MDRO - ESBL in the past         Labs, Microbiology, Radiology and all the pertinent results from current hospitalization and  care every where were reviewed  by me as a part of the evaluation   Plan:   1. Cont IV Meropenem x 500 mg Q 8 hrs  2. Follow up blood cx pending  3. Will be able to choose oral abx when ready and sepsis improved  4. No PICC line necessary   5. Watch for complications     Discussed with patient/Family d/w RN     Thanks for allowing me to participate in your patient's care and please call me with any questions or concerns.     Mary Grace Giron MD  Infectious Disease  [facility] [place] ([facility]) Physician  Phone: [phone]   Fax : [phone]

## 2018-09-21 NOTE — PROGRESS NOTES
swallow required occasionally. No overt s/s. Dysphagia Tx:   See above    Goals:   Dysphagia Goals: The patient will tolerate recommended diet without observed clinical signs of aspiration, -ongoing   The patient/caregiver will demonstrate understanding of compensatory strategies for improved swallowing safety. , -ongoing   The patient will tolerate mechanical soft foods 10/10. -NA    Assessment:   Impressions:   Dysphagia Diagnosis: Moderate oral stage dysphagia, Moderate pharyngeal stage dysphagia   Pt appears similar to eval.   Pt tolerated thin liquids via straw as well as puree consistencies without overt s/s ; however pt refused to hold cup himself and SLP had to feed pt. Improved bolus transit and timing of swallow with liquids; however continues with prolonged bolus holding with very delayed swallow initiation on puree. Cues to initiate swallow required occasionally. REC continue on diet as ordered.    SLP to continue to follow for dysphagia tx     Diet Recommendations:  Dysphagia I (puree)  Thin liquids   Recommended Form of Meds: Meds in puree    Strategies:   Compensatory Swallowing Strategies: Eat/Feed slowly, Upright as possible for all oral intake, Small bites/sips, Assist feed    Education:  Consulted and agree with results and recommendations: Patient, RN  Patient Education: pt's recommendation and goals   Patient Education Response: Needs reinforcement, No evidence of learning    Prognosis:   fair    Plan:     Continue Dysphagia Therapy: yes  Interventions: Therapeutic Interventions: Diet tolerance monitoring, Therapeutic PO trials with SLP, Patient/Family education  Duration/Frequency of therapy while on unit: Duration/Frequency of Treatment  Duration/Frequency of Treatment: 3-5x week on acute care floor  Discharge Instructions:   Anticipate TBD for further skilled Speech Therapy for Dysphagia at discharge    This note serves as a D/C Summary in the event that this patient is discharged

## 2018-09-21 NOTE — PROGRESS NOTES
clubbing, cyanosis or edema bilaterally. Full range of motion without deformity. Skin: Skin color, texture, turgor normal.  No rashes or lesions. Neurologic:  Neurovascularly intact without any focal sensory/motor deficits. Cranial nerves: II-XII intact, grossly non-focal.  Psychiatric: Alert and oriented, thought content appropriate, normal insight  Capillary Refill: Brisk,< 3 seconds   Peripheral Pulses: +2 palpable, equal bilaterally       Labs:   Recent Labs      09/19/18   1053  09/20/18   0603  09/21/18   0658   WBC  17.4*  14.8*  13.7*   HGB  11.4*  10.8*  11.3*   HCT  35.0*  32.9*  34.4*   PLT  286  279  319     Recent Labs      09/19/18   1053  09/20/18   0603  09/21/18   0658   NA  133*  141  138   K  4.3  3.7  3.8   CL  95*  103  99   CO2  30  29  31   BUN  32*  23*  22*   CREATININE  0.8  0.7*  0.7*   CALCIUM  9.7  9.1  9.2     Recent Labs      09/19/18   1053   AST  62*   ALT  53*   BILITOT  0.8   ALKPHOS  150*     No results for input(s): INR in the last 72 hours. No results for input(s): Juan Pablo Alexander in the last 72 hours. Urinalysis:      Lab Results   Component Value Date    NITRU POSITIVE 09/19/2018    WBCUA 176 09/19/2018    BACTERIA 4+ 09/19/2018    RBCUA 13 09/19/2018    BLOODU LARGE 09/19/2018    SPECGRAV 1.017 09/19/2018    GLUCOSEU Negative 09/19/2018       Radiology:  CT Head WO Contrast   Final Result   No acute intracranial abnormality. XR CHEST PORTABLE   Final Result   Increased bibasilar airspace opacities which may be infectious. Consider   follow-up imaging to resolution. Assessment/Plan:    -sepsis due to UTI, bacteremia  -Complicated  E coli UTI associated with chronic indwelling  suprapubic catheter. . hx ESBL. . Catheter exchanged t9/20 by urology  -E. Coli bacteremia dt UTI. .CoNS is a contaminant  -bibasilar infiltrates-dt atelectasis,les likely pneumonia  -acute metabolic encephalopathy associated with sepsis,UTI-- improved  -Diabetes mellitus type 2- controlled-on SSI  -Moderate oral pharyngeal dysphagia-on modifed diet      Plan  - c/w meropenem-managed by ID . .. Azithromycin dc'd  -Follow-up on blood cultures,  - IV fluids dc'd        DVT Prophylaxis:  Lovenox  Diet: Dietary Nutrition Supplements: Diabetic Oral Supplement  DIET CARB CONTROL;  Dysphagia I Pureed  Code Status: DNR-CCA    PT/OT Eval Status:  ongoing    Dispo -   consulted for possible ECF    Nitin Jarvis MD

## 2018-09-21 NOTE — PROGRESS NOTES
services while in acute care, [facility] score indicates non homebound dc. Treatment Diagnosis: Decreased functional mobility ; Decreased ADL status; Decreased cognition;  Prognosis: Fair;Good  History: hx of CVA, DM, ESBL, suprapubic catheter, now with falls, UTI, PNA  Exam: self care, transfers  Assistance / Modification: assist, cues and walker/ equipment  Patient Education: safe hand placement during transfers  REQUIRES OT FOLLOW UP: Yes  Activity Tolerance  Activity Tolerance: Patient Tolerated treatment well  Safety Devices  Safety Devices in place: Yes  Type of devices: Call light within reach; Chair alarm in place; Left in chair;Gait belt;Nurse notified          Plan   Plan  Times per week: 3-5x  Current Treatment Recommendations: Patient/Caregiver Education & Training, Self-Care / ADL, Functional Mobility Training, Safety Education & Training  AM-PAC Score        AM-PAC Inpatient Daily Activity Raw Score: 14  AM-PAC Inpatient ADL T-Scale Score : 33.39  ADL Inpatient CMS 0-100% Score: 59.67  ADL Inpatient CMS G-Code Modifier : CK    Goals  Short term goals  Time Frame for Short term goals: at d/c: status on going   Short term goal 1: Set up for grooming  Short term goal 2: Minimal assist for bathing and dressing  Short term goal 3: SBA for bed mobility  Short term goal 4: SBA for transfers  Patient Goals   Patient goals : \"To eat\"       Therapy Time   Individual Concurrent Group Co-treatment   Time In 1035         Time Out 1050         Minutes 15         Timed Code Treatment Minutes: 15 Minutes     If patient discharges prior to next treatment, this note will serve as discharge summary. Continue per plan of care if patient does not discharge. Christiano Encinas.  [address] [facility], OTR/L [id number]

## 2018-09-22 LAB
ANION GAP SERPL CALCULATED.3IONS-SCNC: 9 MMOL/L (ref 3–16)
BASOPHILS ABSOLUTE: 0 K/UL (ref 0–0.2)
BASOPHILS RELATIVE PERCENT: 0.2 %
BLOOD CULTURE, ROUTINE: ABNORMAL
BUN BLDV-MCNC: 22 MG/DL (ref 7–20)
CALCIUM SERPL-MCNC: 9.1 MG/DL (ref 8.3–10.6)
CHLORIDE BLD-SCNC: 100 MMOL/L (ref 99–110)
CO2: 30 MMOL/L (ref 21–32)
CREAT SERPL-MCNC: 0.7 MG/DL (ref 0.8–1.3)
CULTURE, BLOOD 2: ABNORMAL
EOSINOPHILS ABSOLUTE: 0.2 K/UL (ref 0–0.6)
EOSINOPHILS RELATIVE PERCENT: 2.1 %
GFR AFRICAN AMERICAN: >60
GFR NON-AFRICAN AMERICAN: >60
GLUCOSE BLD-MCNC: 128 MG/DL (ref 70–99)
GLUCOSE BLD-MCNC: 167 MG/DL (ref 70–99)
GLUCOSE BLD-MCNC: 208 MG/DL (ref 70–99)
GLUCOSE BLD-MCNC: 236 MG/DL (ref 70–99)
GLUCOSE BLD-MCNC: 254 MG/DL (ref 70–99)
HCT VFR BLD CALC: 34.6 % (ref 40.5–52.5)
HEMOGLOBIN: 11.2 G/DL (ref 13.5–17.5)
LYMPHOCYTES ABSOLUTE: 0.6 K/UL (ref 1–5.1)
LYMPHOCYTES RELATIVE PERCENT: 5.2 %
MCH RBC QN AUTO: 29.9 PG (ref 26–34)
MCHC RBC AUTO-ENTMCNC: 32.4 G/DL (ref 31–36)
MCV RBC AUTO: 92.3 FL (ref 80–100)
MONOCYTES ABSOLUTE: 1.1 K/UL (ref 0–1.3)
MONOCYTES RELATIVE PERCENT: 9.3 %
NEUTROPHILS ABSOLUTE: 9.8 K/UL (ref 1.7–7.7)
NEUTROPHILS RELATIVE PERCENT: 83.2 %
ORGANISM: ABNORMAL
PDW BLD-RTO: 15 % (ref 12.4–15.4)
PERFORMED ON: ABNORMAL
PLATELET # BLD: 376 K/UL (ref 135–450)
PMV BLD AUTO: 7.6 FL (ref 5–10.5)
POTASSIUM REFLEX MAGNESIUM: 3.9 MMOL/L (ref 3.5–5.1)
RBC # BLD: 3.75 M/UL (ref 4.2–5.9)
SODIUM BLD-SCNC: 139 MMOL/L (ref 136–145)
WBC # BLD: 11.8 K/UL (ref 4–11)

## 2018-09-22 PROCEDURE — 94760 N-INVAS EAR/PLS OXIMETRY 1: CPT

## 2018-09-22 PROCEDURE — 2580000003 HC RX 258: Performed by: FAMILY MEDICINE

## 2018-09-22 PROCEDURE — 2060000000 HC ICU INTERMEDIATE R&B

## 2018-09-22 PROCEDURE — 6370000000 HC RX 637 (ALT 250 FOR IP): Performed by: FAMILY MEDICINE

## 2018-09-22 PROCEDURE — 9990 CHARGE CONVERSION

## 2018-09-22 PROCEDURE — 6360000002 HC RX W HCPCS: Performed by: INTERNAL MEDICINE

## 2018-09-22 PROCEDURE — 99233 SBSQ HOSP IP/OBS HIGH 50: CPT | Performed by: INTERNAL MEDICINE

## 2018-09-22 PROCEDURE — 80048 BASIC METABOLIC PNL TOTAL CA: CPT

## 2018-09-22 PROCEDURE — 6360000002 HC RX W HCPCS: Performed by: FAMILY MEDICINE

## 2018-09-22 PROCEDURE — 85025 COMPLETE CBC W/AUTO DIFF WBC: CPT

## 2018-09-22 PROCEDURE — 2580000003 HC RX 258: Performed by: INTERNAL MEDICINE

## 2018-09-22 PROCEDURE — 36415 COLL VENOUS BLD VENIPUNCTURE: CPT

## 2018-09-22 RX ADMIN — POLYETHYLENE GLYCOL 3350 17 G: 17 POWDER, FOR SOLUTION ORAL at 08:07

## 2018-09-22 RX ADMIN — LEVOTHYROXINE SODIUM 75 MCG: 75 TABLET ORAL at 06:59

## 2018-09-22 RX ADMIN — INSULIN GLARGINE 25 UNITS: 100 INJECTION, SOLUTION SUBCUTANEOUS at 22:13

## 2018-09-22 RX ADMIN — OXYBUTYNIN CHLORIDE 5 MG: 5 TABLET ORAL at 13:40

## 2018-09-22 RX ADMIN — Medication 10 ML: at 08:08

## 2018-09-22 RX ADMIN — ASPIRIN 81 MG 81 MG: 81 TABLET ORAL at 08:07

## 2018-09-22 RX ADMIN — ACETAMINOPHEN 650 MG: 325 TABLET, FILM COATED ORAL at 11:42

## 2018-09-22 RX ADMIN — FUROSEMIDE 20 MG: 20 TABLET ORAL at 08:07

## 2018-09-22 RX ADMIN — MEROPENEM 500 MG: 500 INJECTION, POWDER, FOR SOLUTION INTRAVENOUS at 04:00

## 2018-09-22 RX ADMIN — POTASSIUM CHLORIDE 20 MEQ: 1.5 POWDER, FOR SOLUTION ORAL at 08:07

## 2018-09-22 RX ADMIN — SIMVASTATIN 10 MG: 10 TABLET, FILM COATED ORAL at 22:06

## 2018-09-22 RX ADMIN — Medication 10 ML: at 22:07

## 2018-09-22 RX ADMIN — MEROPENEM 500 MG: 500 INJECTION, POWDER, FOR SOLUTION INTRAVENOUS at 11:42

## 2018-09-22 RX ADMIN — OXYBUTYNIN CHLORIDE 5 MG: 5 TABLET ORAL at 08:07

## 2018-09-22 RX ADMIN — MEROPENEM 500 MG: 500 INJECTION, POWDER, FOR SOLUTION INTRAVENOUS at 22:07

## 2018-09-22 RX ADMIN — INSULIN LISPRO 4 UNITS: 100 INJECTION, SOLUTION INTRAVENOUS; SUBCUTANEOUS at 18:06

## 2018-09-22 RX ADMIN — ENOXAPARIN SODIUM 40 MG: 40 INJECTION SUBCUTANEOUS at 08:07

## 2018-09-22 RX ADMIN — INSULIN LISPRO 2 UNITS: 100 INJECTION, SOLUTION INTRAVENOUS; SUBCUTANEOUS at 22:13

## 2018-09-22 RX ADMIN — LISINOPRIL 10 MG: 10 TABLET ORAL at 08:07

## 2018-09-22 RX ADMIN — OXYBUTYNIN CHLORIDE 5 MG: 5 TABLET ORAL at 22:08

## 2018-09-22 RX ADMIN — INSULIN LISPRO 6 UNITS: 100 INJECTION, SOLUTION INTRAVENOUS; SUBCUTANEOUS at 12:34

## 2018-09-22 ASSESSMENT — PAIN SCALES - GENERAL
PAINLEVEL_OUTOF10: 4
PAINLEVEL_OUTOF10: 0
PAINLEVEL_OUTOF10: 0

## 2018-09-22 NOTE — PROGRESS NOTES
lesions   Denies focal weakness, sensory change or other neurologic symptoms  No lymph node swelling or tenderness.     FEVERS, chills, confusion, and joint pains, +     PHYSICAL EXAM:      Vitals:    BP (!) 156/93   Pulse 90   Temp 97.8 °F (36.6 °C) (Oral)   Resp 18   Ht 5' 11.5\" (1.816 m)   Wt 170 lb 10.2 oz (77.4 kg)   SpO2 96%   BMI 23.47 kg/m²   General Appearance: alert,in some acute distress, +  pallor, no icterus BI lateral hearing aids+   Skin: warm and dry, no rash or erythema  Head: normocephalic and atraumatic  Eyes: pupils equal, round, and reactive to light, conjunctivae normal  ENT: tympanic membrane, external ear and ear canal normal bilaterally, nose without deformity, nasal mucosa and turbinates normal without polyps  Neck: supple and non-tender without mass, no thyromegaly  no cervical lymphadenopathy  Pulmonary/Chest: clear to auscultation bilaterally- no wheezes, rales or rhonchi, normal air movement, no respiratory distress  Cardiovascular: normal rate, regular rhythm, normal S1 and S2, no murmurs, rubs, clicks, or gallops, no carotid bruits  Abdomen: soft, non-tender, non-distended, normal bowel sounds, no masses or organomegaly Supra pubic catheter+ exit site drainage+    Extremities: no cyanosis, clubbing or edema  Musculoskeletal: normal range of motion, no joint swelling, deformity or tenderness lower leg edema+  Neurologic: reflexes normal and symmetric, no cranial nerve deficit  Psych:  Orientation, sensorium,better now  Lines:  IV                    Data Review:    Lab Results   Component Value Date    WBC 13.7 (H) 09/21/2018    HGB 11.3 (L) 09/21/2018    HCT 34.4 (L) 09/21/2018    MCV 92.5 09/21/2018     09/21/2018     Lab Results   Component Value Date    CREATININE 0.7 (L) 09/22/2018    BUN 22 (H) 09/22/2018     09/22/2018    K 3.9 09/22/2018     09/22/2018    CO2 30 09/22/2018       Hepatic Function Panel:   Lab Results   Component Value Date    ALKPHOS 150 09/19/2018    ALT 53 09/19/2018    AST 62 09/19/2018    PROT 6.3 09/19/2018    PROT 7.4 12/25/2012    BILITOT 0.8 09/19/2018    BILIDIR 0.3 04/09/2017    IBILI 0.9 04/09/2017    LABALBU 2.5 09/19/2018       UA:  Lab Results   Component Value Date    COLORU YELLOW 09/19/2018    CLARITYU CLOUDY 09/19/2018    GLUCOSEU Negative 09/19/2018    BILIRUBINUR Negative 09/19/2018    KETUA Negative 09/19/2018    SPECGRAV 1.017 09/19/2018    BLOODU LARGE 09/19/2018    PHUR 5.5 09/19/2018    PROTEINU 30 09/19/2018    UROBILINOGEN 1.0 09/19/2018    NITRU POSITIVE 09/19/2018    LEUKOCYTESUR LARGE 09/19/2018    LABMICR YES 09/19/2018    URINETYPE Not Specified 09/19/2018      Urine Microscopic:   Lab Results   Component Value Date    LABCAST 0-1 Hyaline 04/09/2017    BACTERIA 4+ 09/19/2018    COMU see below 09/19/2018    HYALCAST 9 09/19/2018    WBCUA 176 09/19/2018    RBCUA 13 09/19/2018    EPIU 0 09/19/2018       MICRO: cultures reviewed and updated by me          Culture Blood #2 [[id number]] (Abnormal) Collected: 09/19/18 1051   Order Status: Completed Specimen: Blood Whole Citrate Updated: 09/21/18 0947    Culture, Blood 2 -- (A)    Gram stain Anaerobic bottle:   Gram negative rods   Information to follow   Gram stain Aerobic bottle:   Gram positive cocci in clusters   resembling Staphylococcus   Information to follow     Organism Staphylococcus coagulase negative DNA Detected (A)    Culture, Blood 2 See additional report for complete BCID panel. Organism Escherichia coli (A)    Culture, Blood 2 --    POSITIVE for   No further workup   Isolated one out of two bottles   Refer to ([id number]) for sensitivity results     Organism Staphylococcus coagulase-negative (A)    Culture, Blood 2 --    POSITIVE for   This organism was isolated from one bottle only. Susceptibility testing is not routinely done as this   organism frequently represents skin contamination.    Additional testing can be ordered by calling the   Microbiology Specimen: Urine, clean catch Updated: 09/21/18 0626    Urine Culture, Routine --    Organism Escherichia coli (A)    Urine Culture, Routine >100,000 CFU/ml   Narrative:     ORDER#: [id number]                          ORDERED BY: EILEEN Gabriel  SOURCE: Urine Clean Catch                  COLLECTED:  09/19/18 10:53  ANTIBIOTICS AT DE. :                      RECEIVED :  09/19/18 11:11  Performed at:  [facility]  [address], [place] [address]   Phone [phone]   Culture Blood #2 [[id number]] Collected: 09/20/18 1109   Order Status: Sent Specimen: Blood Updated: 09/20/18 1115   Culture Blood #1 [[id number]] Collected: 09/20/18 0956   Order Status: Sent Specimen: Blood from Blood Updated: 09/20/18 1003   Culture, Blood PCR Report [[id number]] Collected: 09/19/18 1051   Order Status: Completed Updated: 09/20/18 0818    Report SEE IMAGE   Narrative:     Evia Larhonda [id number],  Microbiology results called to and read back by Mone Lindquist RN [id number],  09/20/2018 08:17, by Wiley sen on this admission - call not needed per SOP, 09/20/2018 02:21,  by [facility]  Referred out by:  [facility]  [address], [place] [address]   Phone [phone]   Culture, Blood PCR Report [[id number]] Collected: 09/19/18 1053   Order Status: Completed Updated: 09/20/18 0221    Report SEE IMAGE   Narrative:     Evruddy Hong [id number],  Microbiology results called to and read back by Massimo Garcia,  09/20/2018 02:20, by [facility]  Microbiology results called to and read back by CLARA Diallo, 09/20/2018  02:19, by [facility]  Referred out by:  [facility]  [address], [place] [address]   Phone [phone]   Respiratory Culture [[id number]]    Order Status: No result Specimen: Sputum Induced      ESCHERICHIA COLI     Antibiotic Interpretation GAGANDEEP Unit   ampicillin Sensitive <=4 mcg/mL ceFAZolin Sensitive 2 mcg/mL   Comment: NOTE: Cefazolin should only be used for uncomplicated UTI        for E.coli, Klebsiella pneumoniae or Proteus mirabilis. cefTRIAXone Sensitive <=1 mcg/mL   cefepime Sensitive <=1 mcg/mL   ciprofloxacin Resistant >2 mcg/mL   gentamicin Sensitive <=2 mcg/mL   levofloxacin Resistant >4 mcg/mL   meropenem Sensitive <=0.25 mcg/mL   nitrofurantoin Sensitive <=16 mcg/mL   piperacillin-tazobactam Sensitive <=2/4 mcg/mL   tetracycline Resistant >8 mcg/mL   trimethoprim-sulfamethoxazole Resistant >2/38 mcg/mL           IMAGING:    CT Head WO Contrast   Final Result   No acute intracranial abnormality. XR CHEST PORTABLE   Final Result   Increased bibasilar airspace opacities which may be infectious. Consider   follow-up imaging to resolution. All the pertinent images and reports for the current Hospitalization were reviewed by me     Scheduled Meds:   meropenem  500 mg Intravenous Q8H    levothyroxine  75 mcg Oral Daily    aspirin  81 mg Oral Daily    simvastatin  10 mg Oral Nightly    oxybutynin  5 mg Oral TID    potassium chloride  20 mEq Oral Daily    lisinopril  10 mg Oral Daily    furosemide  20 mg Oral Daily    insulin glargine  25 Units Subcutaneous Nightly    sodium chloride flush  10 mL Intravenous 2 times per day    enoxaparin  40 mg Subcutaneous Daily    insulin lispro  0-12 Units Subcutaneous TID WC    insulin lispro  0-6 Units Subcutaneous Nightly    polyethylene glycol  17 g Oral Daily       Continuous Infusions:   dextrose         PRN Meds:  polyethyl glycol-propyl glycol 0.4-0.3 %, sodium chloride flush, magnesium hydroxide, ondansetron, acetaminophen, albuterol, glucose, dextrose, glucagon (rDNA), dextrose      Assessment:   Sepsis   Fevers,chills , confusion from above    source  E coli on urine cx   E coli bacteremia  Coagulase negative staph on Blood cx is a contaminant  Supra pubic catheter in place  Falls from ?  Infection

## 2018-09-22 NOTE — PLAN OF CARE
Problem: Falls - Risk of:  Goal: Will remain free from falls  Will remain free from falls     Outcome: Ongoing  Fall risk assessment completed every shift. All precautions in place. Pt has call light within reach at all times. Room clear of clutter. Pt aware to call for assistance when getting up. Goal: Absence of physical injury  Absence of physical injury     Outcome: Ongoing      Problem: Confusion - Acute:  Goal: Absence of continued neurological deterioration signs and symptoms    Absence of continued neurological deterioration signs and symptoms   Outcome: Ongoing      Problem: Discharge Planning:  Goal: Ability to perform activities of daily living will improve  Ability to perform activities of daily living will improve    Outcome: Ongoing    Goal: Participates in care planning  Participates in care planning    Outcome: Ongoing      Problem: Injury - Risk of, Physical Injury:  Goal: Absence of physical injury  Absence of physical injury     Outcome: Ongoing    Goal: Will remain free from falls  Will remain free from falls     Outcome: Ongoing  Fall risk assessment completed every shift. All precautions in place. Pt has call light within reach at all times. Room clear of clutter. Pt aware to call for assistance when getting up.        Problem: Mood - Altered:  Goal: Mood stable  Mood stable    Outcome: Ongoing    Goal: Absence of abusive behavior  Absence of abusive behavior    Outcome: Ongoing    Goal: Verbalizations of feeling emotionally comfortable while being cared for will increase  Verbalizations of feeling emotionally comfortable while being cared for will increase    Outcome: Ongoing      Problem: Psychomotor Activity - Altered:  Goal: Absence of psychomotor disturbance signs and symptoms  Absence of psychomotor disturbance signs and symptoms    Outcome: Ongoing      Problem: Sensory Perception - Impaired:  Goal: Demonstrations of improved sensory functioning will increase  Demonstrations of

## 2018-09-23 LAB
ANION GAP SERPL CALCULATED.3IONS-SCNC: 10 MMOL/L (ref 3–16)
BASOPHILS ABSOLUTE: 0 K/UL (ref 0–0.2)
BASOPHILS RELATIVE PERCENT: 0.2 %
BUN BLDV-MCNC: 19 MG/DL (ref 7–20)
CALCIUM SERPL-MCNC: 9.3 MG/DL (ref 8.3–10.6)
CHLORIDE BLD-SCNC: 99 MMOL/L (ref 99–110)
CO2: 32 MMOL/L (ref 21–32)
CREAT SERPL-MCNC: 0.6 MG/DL (ref 0.8–1.3)
EOSINOPHILS ABSOLUTE: 0.5 K/UL (ref 0–0.6)
EOSINOPHILS RELATIVE PERCENT: 4.2 %
GFR AFRICAN AMERICAN: >60
GFR NON-AFRICAN AMERICAN: >60
GLUCOSE BLD-MCNC: 108 MG/DL (ref 70–99)
GLUCOSE BLD-MCNC: 139 MG/DL (ref 70–99)
GLUCOSE BLD-MCNC: 170 MG/DL (ref 70–99)
GLUCOSE BLD-MCNC: 195 MG/DL (ref 70–99)
GLUCOSE BLD-MCNC: 85 MG/DL (ref 70–99)
HCT VFR BLD CALC: 36.8 % (ref 40.5–52.5)
HEMOGLOBIN: 11.9 G/DL (ref 13.5–17.5)
LYMPHOCYTES ABSOLUTE: 0.9 K/UL (ref 1–5.1)
LYMPHOCYTES RELATIVE PERCENT: 7 %
MCH RBC QN AUTO: 29.5 PG (ref 26–34)
MCHC RBC AUTO-ENTMCNC: 32.3 G/DL (ref 31–36)
MCV RBC AUTO: 91.2 FL (ref 80–100)
MONOCYTES ABSOLUTE: 1.2 K/UL (ref 0–1.3)
MONOCYTES RELATIVE PERCENT: 9.7 %
NEUTROPHILS ABSOLUTE: 9.6 K/UL (ref 1.7–7.7)
NEUTROPHILS RELATIVE PERCENT: 78.9 %
PDW BLD-RTO: 14.5 % (ref 12.4–15.4)
PERFORMED ON: ABNORMAL
PLATELET # BLD: 422 K/UL (ref 135–450)
PMV BLD AUTO: 7.5 FL (ref 5–10.5)
POTASSIUM REFLEX MAGNESIUM: 3.8 MMOL/L (ref 3.5–5.1)
POTASSIUM SERPL-SCNC: 3.8 MMOL/L (ref 3.5–5.1)
RBC # BLD: 4.03 M/UL (ref 4.2–5.9)
SODIUM BLD-SCNC: 141 MMOL/L (ref 136–145)
WBC # BLD: 12.1 K/UL (ref 4–11)

## 2018-09-23 PROCEDURE — 2580000003 HC RX 258: Performed by: FAMILY MEDICINE

## 2018-09-23 PROCEDURE — 6360000002 HC RX W HCPCS: Performed by: FAMILY MEDICINE

## 2018-09-23 PROCEDURE — 6370000000 HC RX 637 (ALT 250 FOR IP): Performed by: FAMILY MEDICINE

## 2018-09-23 PROCEDURE — 94760 N-INVAS EAR/PLS OXIMETRY 1: CPT

## 2018-09-23 PROCEDURE — 80048 BASIC METABOLIC PNL TOTAL CA: CPT

## 2018-09-23 PROCEDURE — 2580000003 HC RX 258: Performed by: INTERNAL MEDICINE

## 2018-09-23 PROCEDURE — 36415 COLL VENOUS BLD VENIPUNCTURE: CPT

## 2018-09-23 PROCEDURE — 99232 SBSQ HOSP IP/OBS MODERATE 35: CPT | Performed by: INTERNAL MEDICINE

## 2018-09-23 PROCEDURE — 2060000000 HC ICU INTERMEDIATE R&B

## 2018-09-23 PROCEDURE — 85025 COMPLETE CBC W/AUTO DIFF WBC: CPT

## 2018-09-23 PROCEDURE — 6360000002 HC RX W HCPCS: Performed by: INTERNAL MEDICINE

## 2018-09-23 RX ORDER — LIDOCAINE HYDROCHLORIDE 10 MG/ML
5 INJECTION, SOLUTION EPIDURAL; INFILTRATION; INTRACAUDAL; PERINEURAL ONCE
Status: DISCONTINUED | OUTPATIENT
Start: 2018-09-23 | End: 2018-09-24 | Stop reason: HOSPADM

## 2018-09-23 RX ORDER — LORAZEPAM 0.5 MG/1
0.5 TABLET ORAL NIGHTLY PRN
Status: DISCONTINUED | OUTPATIENT
Start: 2018-09-23 | End: 2018-09-24 | Stop reason: HOSPADM

## 2018-09-23 RX ORDER — SODIUM CHLORIDE 0.9 % (FLUSH) 0.9 %
10 SYRINGE (ML) INJECTION PRN
Status: DISCONTINUED | OUTPATIENT
Start: 2018-09-23 | End: 2018-09-24 | Stop reason: HOSPADM

## 2018-09-23 RX ORDER — TEMAZEPAM 15 MG/1
15 CAPSULE ORAL NIGHTLY PRN
Status: DISCONTINUED | OUTPATIENT
Start: 2018-09-23 | End: 2018-09-23 | Stop reason: CLARIF

## 2018-09-23 RX ORDER — SODIUM CHLORIDE 0.9 % (FLUSH) 0.9 %
10 SYRINGE (ML) INJECTION EVERY 12 HOURS SCHEDULED
Status: DISCONTINUED | OUTPATIENT
Start: 2018-09-23 | End: 2018-09-23 | Stop reason: SDUPTHER

## 2018-09-23 RX ADMIN — OXYBUTYNIN CHLORIDE 5 MG: 5 TABLET ORAL at 21:05

## 2018-09-23 RX ADMIN — INSULIN GLARGINE 25 UNITS: 100 INJECTION, SOLUTION SUBCUTANEOUS at 21:05

## 2018-09-23 RX ADMIN — INSULIN LISPRO 1 UNITS: 100 INJECTION, SOLUTION INTRAVENOUS; SUBCUTANEOUS at 21:05

## 2018-09-23 RX ADMIN — LISINOPRIL 10 MG: 10 TABLET ORAL at 08:13

## 2018-09-23 RX ADMIN — MEROPENEM 500 MG: 500 INJECTION, POWDER, FOR SOLUTION INTRAVENOUS at 21:00

## 2018-09-23 RX ADMIN — MEROPENEM 500 MG: 500 INJECTION, POWDER, FOR SOLUTION INTRAVENOUS at 12:15

## 2018-09-23 RX ADMIN — OXYBUTYNIN CHLORIDE 5 MG: 5 TABLET ORAL at 13:14

## 2018-09-23 RX ADMIN — ENOXAPARIN SODIUM 40 MG: 40 INJECTION SUBCUTANEOUS at 08:12

## 2018-09-23 RX ADMIN — SIMVASTATIN 10 MG: 10 TABLET, FILM COATED ORAL at 21:05

## 2018-09-23 RX ADMIN — Medication 10 ML: at 08:16

## 2018-09-23 RX ADMIN — Medication 10 ML: at 21:04

## 2018-09-23 RX ADMIN — ASPIRIN 81 MG 81 MG: 81 TABLET ORAL at 08:12

## 2018-09-23 RX ADMIN — INSULIN LISPRO 2 UNITS: 100 INJECTION, SOLUTION INTRAVENOUS; SUBCUTANEOUS at 12:19

## 2018-09-23 RX ADMIN — MEROPENEM 500 MG: 500 INJECTION, POWDER, FOR SOLUTION INTRAVENOUS at 04:47

## 2018-09-23 RX ADMIN — POTASSIUM CHLORIDE 20 MEQ: 1.5 POWDER, FOR SOLUTION ORAL at 08:12

## 2018-09-23 RX ADMIN — FUROSEMIDE 20 MG: 20 TABLET ORAL at 08:13

## 2018-09-23 RX ADMIN — OXYBUTYNIN CHLORIDE 5 MG: 5 TABLET ORAL at 08:13

## 2018-09-23 RX ADMIN — LEVOTHYROXINE SODIUM 75 MCG: 75 TABLET ORAL at 06:33

## 2018-09-23 ASSESSMENT — PAIN SCALES - GENERAL
PAINLEVEL_OUTOF10: 0

## 2018-09-23 NOTE — PROGRESS NOTES
Infectious Disease Follow up Notes  Admit Date: 9/19/2018  Hospital Day: 5    Antibiotics : IV Meropenem     CHIEF COMPLAINT:     Sepsis  Fevers  E coli bacteremia  Change in Mentation  WBC elevation   UTI  Subjective interval History :  80 y.o. man lives in assisted living facility and has suprapubic catheter and h/o Urine infections in the past admitted with falls x 2 more confusion, leg swelling and dark urine and fevers . He has SPC change in ED and noted to have some drainage from the exit site UA very abnormal urine cx E coli and Blood cx E coli and coagulase negative staph, fever 102 on admit with elevation in WBC. H/o ESBL urine infection in the past. We are consulted for abx recommendations.      Tolerating IV abx ok no chills WBC remain elevated no fevers SPC working well and will plan for PICC d/w pt not sure how much he understood     Past Medical History:    Past Medical History:   Diagnosis Date    Anemia     Arthritis     Benign prostate hyperplasia     [facility]bral artery [facility] with [facility]     Chronic kidney disease     COPD (chronic obstructive pulmonary disease) ([facility]     Diabetes mellitus ([facility] [facility]     II    ESBL (extended spectrum beta-lactamase) producing bacteria infection 08/12/2018    urine    Hyperlipidemia     Hypertension     Pneumonia     Schizophrenia ([facility]     Thyroid disease     hypo    Urine retention        Past Surgical History:    Past Surgical History:   Procedure Laterality Date    MA INCISE/DRAIN BLADDER  Cystoscopy, placement suprapubic catheter    Cystostomy, Suprapubic    PROSTATECTOMY      TONSILLECTOMY         Current Medications:    Outpatient Prescriptions Marked as Taking for the 9/19/18 encounter [facility] Encounter) with Annie Gastelum DO   Medication Sig Dispense Refill    polyethylene glycol (GLYCOLAX) powder Take 17 g by mouth daily      insulin glargine (LANTUS) 100 UNIT/ML injection pen Inject 25 Units into the skin nightly 5 Pen 3    linagliptin (TRADJENTA) 5 MG tablet Take 1 tablet by mouth daily 30 tablet 0    furosemide (LASIX) 20 MG tablet Take 20 mg by mouth daily      insulin lispro (HUMALOG KWIKPEN) 100 UNIT/ML pen **Medium Dose Corrective Algorithm**  Glucose: Dose:  If <139             No Insulin  140-199 2 Units  200-249 4 Units  250-299 6 Units  300-349 8 Units  350-400 10 Units  Above 400       12 Units 5 Pen 3    temazepam (RESTORIL) 15 MG capsule Take 2 capsules by mouth nightly as needed for Sleep 30 capsule 5    lisinopril (PRINIVIL;ZESTRIL) 10 MG tablet Take 1 tablet by mouth daily 30 tablet 3    oxybutynin (DITROPAN) 5 MG tablet Take 5 mg by mouth 3 times daily      potassium chloride 20 MEQ/15ML (10%) oral solution Take 20 mEq by mouth daily      simvastatin (ZOCOR) 10 MG tablet Take 10 mg by mouth nightly      aspirin 81 MG chewable tablet Take 1 tablet by mouth daily. 30 tablet 0    levothyroxine (SYNTHROID) 75 MCG tablet Take 75 mcg by mouth Daily. Allergies:  Pcn [penicillins]; Sulfa antibiotics; and Ceftriaxone    Immunizations :   Immunization History   Administered Date(s) Administered    Influenza Virus Vaccine 11/10/2015       Social History:   Social History   Substance Use Topics    Smoking status: Former Smoker     Types: Cigarettes    Smokeless tobacco: Not on file    Alcohol use No     History   Smoking Status    Former Smoker    Types: Cigarettes   Smokeless Tobacco    Not on file          Family History : no DM no COPD     REVIEW OF SYSTEMS:    No fever / chills / sweats. No weight loss. No visual change, eye pain, eye discharge. No oral lesion, sore throat, dysphagia. Denies cough / sputum/Sob   Denies chest pain, palpitations/ dizziness  Denies nausea/ vomiting/abdominal pain/diarrhea. Denies dysuria or change in urinary function. Denies joint swelling or pain. No myalgia, arthralgia.   No Completed Specimen: Urine, clean catch Updated: 09/21/18 0626    Urine Culture, Routine --    Organism Escherichia coli (A)    Urine Culture, Routine >100,000 CFU/ml   Narrative:     ORDER#: [id number]                          ORDERED BY: EILEEN Paredes  SOURCE: Urine Clean Catch                  COLLECTED:  09/19/18 10:53  ANTIBIOTICS AT DE. :                      RECEIVED :  09/19/18 11:11  Performed at:  [facility]  [address] [place], [place] [address]   Phone [phone]   Culture Blood #2 [[id number]] Collected: 09/20/18 1109   Order Status: Sent Specimen: Blood Updated: 09/20/18 1115   Culture Blood #1 [[id number]] Collected: 09/20/18 0956   Order Status: Sent Specimen: Blood from Blood Updated: 09/20/18 1003   Culture, Blood PCR Report [[id number]] Collected: 09/19/18 1051   Order Status: Completed Updated: 09/20/18 0818    Report SEE IMAGE   Narrative:     Shruthi Adams [id number],  Microbiology results called to and read back by Leti Munoz RN [email or web address],  09/20/2018 08:17, by Huong Campos  Previous panic on this admission - call not needed per SOP, 09/20/2018 02:21,  by [facility]  Referred out by:  [facility]  [address] [place], [place] [address]   Phone [phone]   Culture, Blood PCR Report [[id number]] Collected: 09/19/18 1053   Order Status: Completed Updated: 09/20/18 0221    Report SEE IMAGE   Narrative:     Shruthi Adams [id number],  Microbiology results called to and read back by Massimo Foster,  09/20/2018 02:20, by [facility]  Microbiology results called to and read back by CLARA Corona, 09/20/2018  02:19, by [facility]  Referred out by:  [facility]  [address] [place], [place] [address] [address]   Phone [phone]   Respiratory Culture [[id number]]    Order Status: No result Specimen: Sputum Induced      ESCHERICHIA COLI     Antibiotic Interpretation GAGANDEEP Unit   ampicillin Sensitive

## 2018-09-23 NOTE — PLAN OF CARE
Problem: Falls - Risk of:  Goal: Will remain free from falls  Will remain free from falls     Outcome: Ongoing    Goal: Absence of physical injury  Absence of physical injury     Outcome: Ongoing      Problem: Injury - Risk of, Physical Injury:  Goal: Absence of physical injury  Absence of physical injury     Outcome: Ongoing    Goal: Will remain free from falls  Will remain free from falls     Outcome: Ongoing

## 2018-09-24 ENCOUNTER — APPOINTMENT (OUTPATIENT)
Dept: GENERAL RADIOLOGY | Age: 83
DRG: 698 | End: 2018-09-24
Payer: MEDICARE

## 2018-09-24 VITALS
DIASTOLIC BLOOD PRESSURE: 67 MMHG | TEMPERATURE: 97.7 F | BODY MASS INDEX: 22.48 KG/M2 | HEIGHT: 72 IN | OXYGEN SATURATION: 97 % | WEIGHT: 166.01 LBS | RESPIRATION RATE: 18 BRPM | SYSTOLIC BLOOD PRESSURE: 137 MMHG | HEART RATE: 84 BPM

## 2018-09-24 LAB
ANION GAP SERPL CALCULATED.3IONS-SCNC: 10 MMOL/L (ref 3–16)
BUN BLDV-MCNC: 19 MG/DL (ref 7–20)
CALCIUM SERPL-MCNC: 9.5 MG/DL (ref 8.3–10.6)
CHLORIDE BLD-SCNC: 99 MMOL/L (ref 99–110)
CO2: 32 MMOL/L (ref 21–32)
CREAT SERPL-MCNC: 0.6 MG/DL (ref 0.8–1.3)
GFR AFRICAN AMERICAN: >60
GFR NON-AFRICAN AMERICAN: >60
GLUCOSE BLD-MCNC: 116 MG/DL (ref 70–99)
GLUCOSE BLD-MCNC: 130 MG/DL (ref 70–99)
GLUCOSE BLD-MCNC: 169 MG/DL (ref 70–99)
GLUCOSE BLD-MCNC: 228 MG/DL (ref 70–99)
PERFORMED ON: ABNORMAL
POTASSIUM REFLEX MAGNESIUM: 4.1 MMOL/L (ref 3.5–5.1)
SODIUM BLD-SCNC: 141 MMOL/L (ref 136–145)

## 2018-09-24 PROCEDURE — 05HY33Z INSERTION OF INFUSION DEVICE INTO UPPER VEIN, PERCUTANEOUS APPROACH: ICD-10-PCS | Performed by: HOSPITALIST

## 2018-09-24 PROCEDURE — 2580000003 HC RX 258: Performed by: FAMILY MEDICINE

## 2018-09-24 PROCEDURE — 76937 US GUIDE VASCULAR ACCESS: CPT

## 2018-09-24 PROCEDURE — 36569 INSJ PICC 5 YR+ W/O IMAGING: CPT

## 2018-09-24 PROCEDURE — 6360000002 HC RX W HCPCS: Performed by: INTERNAL MEDICINE

## 2018-09-24 PROCEDURE — 6370000000 HC RX 637 (ALT 250 FOR IP): Performed by: FAMILY MEDICINE

## 2018-09-24 PROCEDURE — 99232 SBSQ HOSP IP/OBS MODERATE 35: CPT | Performed by: INTERNAL MEDICINE

## 2018-09-24 PROCEDURE — C1751 CATH, INF, PER/CENT/MIDLINE: HCPCS

## 2018-09-24 PROCEDURE — 36415 COLL VENOUS BLD VENIPUNCTURE: CPT

## 2018-09-24 PROCEDURE — G8979 MOBILITY GOAL STATUS: HCPCS

## 2018-09-24 PROCEDURE — 2580000003 HC RX 258: Performed by: INTERNAL MEDICINE

## 2018-09-24 PROCEDURE — 97530 THERAPEUTIC ACTIVITIES: CPT

## 2018-09-24 PROCEDURE — G8978 MOBILITY CURRENT STATUS: HCPCS

## 2018-09-24 PROCEDURE — 71045 X-RAY EXAM CHEST 1 VIEW: CPT

## 2018-09-24 PROCEDURE — 94760 N-INVAS EAR/PLS OXIMETRY 1: CPT

## 2018-09-24 PROCEDURE — 6360000002 HC RX W HCPCS: Performed by: FAMILY MEDICINE

## 2018-09-24 PROCEDURE — 80048 BASIC METABOLIC PNL TOTAL CA: CPT

## 2018-09-24 RX ADMIN — MEROPENEM 500 MG: 500 INJECTION, POWDER, FOR SOLUTION INTRAVENOUS at 12:14

## 2018-09-24 RX ADMIN — OXYBUTYNIN CHLORIDE 5 MG: 5 TABLET ORAL at 10:17

## 2018-09-24 RX ADMIN — LISINOPRIL 10 MG: 10 TABLET ORAL at 10:17

## 2018-09-24 RX ADMIN — ASPIRIN 81 MG 81 MG: 81 TABLET ORAL at 10:17

## 2018-09-24 RX ADMIN — POTASSIUM CHLORIDE 20 MEQ: 1.5 POWDER, FOR SOLUTION ORAL at 10:17

## 2018-09-24 RX ADMIN — POLYETHYLENE GLYCOL 3350 17 G: 17 POWDER, FOR SOLUTION ORAL at 10:17

## 2018-09-24 RX ADMIN — LEVOTHYROXINE SODIUM 75 MCG: 75 TABLET ORAL at 05:59

## 2018-09-24 RX ADMIN — MEROPENEM 500 MG: 500 INJECTION, POWDER, FOR SOLUTION INTRAVENOUS at 04:30

## 2018-09-24 RX ADMIN — ENOXAPARIN SODIUM 40 MG: 40 INJECTION SUBCUTANEOUS at 10:18

## 2018-09-24 RX ADMIN — OXYBUTYNIN CHLORIDE 5 MG: 5 TABLET ORAL at 14:39

## 2018-09-24 RX ADMIN — INSULIN LISPRO 4 UNITS: 100 INJECTION, SOLUTION INTRAVENOUS; SUBCUTANEOUS at 18:08

## 2018-09-24 RX ADMIN — FUROSEMIDE 20 MG: 20 TABLET ORAL at 10:17

## 2018-09-24 RX ADMIN — INSULIN LISPRO 2 UNITS: 100 INJECTION, SOLUTION INTRAVENOUS; SUBCUTANEOUS at 12:14

## 2018-09-24 RX ADMIN — Medication 10 ML: at 10:18

## 2018-09-24 ASSESSMENT — PAIN SCALES - GENERAL
PAINLEVEL_OUTOF10: 0
PAINLEVEL_OUTOF10: 0

## 2018-09-24 NOTE — PROGRESS NOTES
He also had prolonged bolus holding with delayed swallow initiation. Suspect premature spillage. · Puree - Pt tolerated puree consistencies; however had prolonged bolus holding with very delayed swallow initiation. Cues to initiate swallow required. Dysphagia Tx:   See above    Goals:   Dysphagia Goals: The patient will tolerate recommended diet without observed clinical signs of aspiration, -ongoing   The patient/caregiver will demonstrate understanding of compensatory strategies for improved swallowing safety. , -ongoing   The patient will tolerate mechanical soft foods 10/10. -NA    Assessment:   Impressions:   Dysphagia Diagnosis: Moderate oral stage dysphagia, Moderate pharyngeal stage dysphagia   Pt appears similar to eval.   Pt tolerated thin liquids via straw as well as puree consistencies without overt s/s ; however pt refused to hold cup himself and SLP had to feed pt. Improved bolus transit and timing of swallow with liquids; however continues with prolonged bolus holding with very delayed swallow initiation on puree. Cues to initiate swallow required occasionally. REC continue on diet as ordered.    SLP to continue to follow for dysphagia tx     Diet Recommendations:  Dysphagia I (puree)  Thin liquids   Recommended Form of Meds: Meds in puree    Strategies:   Compensatory Swallowing Strategies: Eat/Feed slowly, Upright as possible for all oral intake, Small bites/sips, Assist feed    Education:  Consulted and agree with results and recommendations: Patient, RN  Patient Education: pt's recommendation and goals   Patient Education Response: Needs reinforcement, No evidence of learning    Prognosis:   fair    Plan:     Continue Dysphagia Therapy: yes  Interventions: Therapeutic Interventions: Diet tolerance monitoring, Therapeutic PO trials with SLP, Patient/Family education  Duration/Frequency of therapy while on unit: Duration/Frequency of Treatment  Duration/Frequency of Treatment: 3-5x week on acute care floor  Discharge Instructions:   Anticipate TBD for further skilled Speech Therapy for Dysphagia at discharge    This note serves as a D/C Summary in the event that this patient is discharged prior to the next therapy session.     Coded treatment time: 0  Total treatment time: 30    Electronically signed by YONATAN Finney on 9/24/2018 at 2:38 PM

## 2018-09-24 NOTE — PROGRESS NOTES
Infectious Disease Follow up Notes  Admit Date: 9/19/2018  Hospital Day: 6    Antibiotics : IV Meropenem     CHIEF COMPLAINT:     Sepsis  Fevers  E coli bacteremia  Change in Mentation  WBC elevation   UTI  Subjective interval History :  80 y.o. man lives in assisted living facility and has suprapubic catheter and h/o Urine infections in the past admitted with falls x 2 more confusion, leg swelling and dark urine and fevers . He has SPC change in ED and noted to have some drainage from the exit site UA very abnormal urine cx E coli and Blood cx E coli and coagulase negative staph, fever 102 on admit with elevation in WBC. H/o ESBL urine infection in the past. We are consulted for abx recommendations.     PICC placed for IV abx and WBC mild elevation no fevers able to eat ok bladder catheter working well no abd pain some intermittent confusion+      Past Medical History:    Past Medical History:   Diagnosis Date    Anemia     Arthritis     Benign prostate hyperplasia     Cerebral artery occlusion with cerebral infarction ([address]     Chronic kidney disease     COPD (chronic obstructive pulmonary disease) ([address]     Diabetes mellitus (HCC)     II    ESBL (extended spectrum beta-lactamase) producing bacteria infection 08/12/2018    urine    Hyperlipidemia     Hypertension     Pneumonia     Schizophrenia ([address]     Thyroid disease     hypo    Urine retention        Past Surgical History:    Past Surgical History:   Procedure Laterality Date    TX INCISE/DRAIN BLADDER  Cystoscopy, placement suprapubic catheter    Cystostomy, Suprapubic    PROSTATECTOMY      TONSILLECTOMY         Current Medications:    Outpatient Prescriptions Marked as Taking for the 9/19/18 encounter [facility] Encounter) with Giovanny Acevedo DO   Medication Sig Dispense Refill    polyethylene glycol (GLYCOLAX) powder Take 17 g by mouth daily      insulin glargine rashes, skin lesions   Denies focal weakness, sensory change or other neurologic symptoms  No lymph node swelling or tenderness.      chills, confusion, and joint pains, +     PHYSICAL EXAM:      Vitals:    /65   Pulse 96   Temp 98.3 °F (36.8 °C) (Axillary)   Resp 18   Ht 5' 11.5\" (1.816 m)   Wt 166 lb 0.1 oz (75.3 kg)   SpO2 96%   BMI 22.83 kg/m²   General Appearance: alert,in some acute distress, +  pallor, no icterus BI lateral hearing aids+   Skin: warm and dry, no rash or erythema  Head: normocephalic and atraumatic  Eyes: pupils equal, round, and reactive to light, conjunctivae normal  ENT: tympanic membrane, external ear and ear canal normal bilaterally, nose without deformity, nasal mucosa and turbinates normal without polyps  Neck: supple and non-tender without mass, no thyromegaly  no cervical lymphadenopathy  Pulmonary/Chest: clear to auscultation bilaterally- no wheezes, rales or rhonchi, normal air movement, no respiratory distress  Cardiovascular: normal rate, regular rhythm, normal S1 and S2, no murmurs, rubs, clicks, or gallops, no carotid bruits  Abdomen: soft, non-tender, non-distended, normal bowel sounds, no masses or organomegaly Supra pubic catheter+ exit site drainage+    Extremities: no cyanosis, clubbing or edema  Musculoskeletal: normal range of motion, no joint swelling, deformity or tenderness lower leg edema+  Neurologic: reflexes normal and symmetric, no cranial nerve deficit  Psych:  Orientation, sensorium,better now able to tell me the place+  Lines: PICC                    Data Review:    Lab Results   Component Value Date    WBC 12.1 (H) 09/23/2018    HGB 11.9 (L) 09/23/2018    HCT 36.8 (L) 09/23/2018    MCV 91.2 09/23/2018     09/23/2018     Lab Results   Component Value Date    CREATININE 0.6 (L) 09/24/2018    BUN 19 09/24/2018     09/24/2018    K 4.1 09/24/2018    CL 99 09/24/2018    CO2 32 09/24/2018       Hepatic Function Panel:   Lab Results ordered by calling the   Microbiology Department within 30 days. Narrative:     ORDER#: [id number]                          ORDERED BY: EILEEN Lerma  SOURCE: Blood                              COLLECTED:  09/19/18 10:51  ANTIBIOTICS AT DE. :                      RECEIVED :  09/19/18 11:21  CALL  [facility]  [id number] tel. [phone],  Microbiology results called to and read back by Camden Bravo RN [id number],  09/20/2018 08:17, by Jose Gordon  Previous panic on this admission - call not needed per SOP, 09/20/2018 02:21,  by [facility] RESPIRATORY [facility]  If child <=2 yrs old please draw pediatric bottle. ~Blood Culture #2  Performed at:  [facility]  [address] [facility] [place] [address]   Phone [phone]   Culture Blood #1 [[id number]] (Abnormal) Collected: 09/19/18 1053   Order Status: Completed Specimen: Blood from Blood Whole Citrate Updated: 09/21/18 0946    Blood Culture, Routine -- (A)    Gram stain Aerobic bottle:   Gram negative rods   Information to follow     Organism Escherichia coli DNA Detected (A)    Blood Culture, Routine See additional report for complete BCID panel. Organism Escherichia coli (A)    Blood Culture, Routine --    POSITIVE for   Sensitivity to follow   Isolated one out of two bottles    Narrative:     ORDER#: [id number]                          ORDERED BY: Marion Traylor  SOURCE: Blood                              COLLECTED:  09/19/18 10:53  ANTIBIOTICS AT DE. :                      RECEIVED :  09/19/18 11:02  CALL  [facility]  [id number] tel. [phone],  Microbiology results called to and read back by Massimo Almazan,  09/20/2018 02:20, by [facility]  Microbiology results called to and read back by CLARA De León, 09/20/2018  02:19, by [facility]  If child <=2 yrs old please draw pediatric bottle. ~Blood Culture #1  Performed at:  [facility]  [address] [facility] [address] [address]   Phone [phone]   Urine Culture [[id number]] (Abnormal)  Collected: 09/19/18 1053   Order Status: Completed Specimen: Urine, clean catch Updated: 09/21/18 0626    Urine Culture, Routine --    Organism Escherichia coli (A)    Urine Culture, Routine >100,000 CFU/ml   Narrative:     ORDER#: [id number]                          ORDERED BY: EILEEN Sotomayor  SOURCE: Urine Clean Catch                  COLLECTED:  09/19/18 10:53  ANTIBIOTICS AT DE. :                      RECEIVED :  09/19/18 11:11  Performed at:  [address] [place] [address]   Phone [phone]   Culture Blood #2 [[id number]] Collected: 09/20/18 1109   Order Status: Sent Specimen: Blood Updated: 09/20/18 1115   Culture Blood #1 [[id number]] Collected: 09/20/18 0956   Order Status: Sent Specimen: Blood from Blood Updated: 09/20/18 1003   Culture, Blood PCR Report [[id number]] Collected: 09/19/18 1051   Order Status: Completed Updated: 09/20/18 0818    Report SEE IMAGE   Narrative:     Amado Carmona [id number],  Microbiology results called to and read back by Lacey Vargas RN SKK5N,  09/20/2018 08:17, by Vitor Nina panic on this admission - call not needed per SOP, 09/20/2018 02:21,  by [facility] RESPIRATORY [facility]  Referred out by:  [address] [place] [address]   Phone [phone]   Culture, Blood PCR Report [[id number]] Collected: 09/19/18 1053   Order Status: Completed Updated: 09/20/18 0221    Report SEE IMAGE   Narrative:     Amado Carmona [id number],  Microbiology results called to and read back by Massimo Bazan,  09/20/2018 02:20, by [facility] RESPIRATORY [facility]  Microbiology results called to and read back by CLARA Prieto, 09/20/2018  02:19, by [facility] RESPIRATORY [facility]  Referred out by:  [facility]  [address] [address]   Phone [phone]   Respiratory Culture [[id number]]    Order Status: No result Specimen: Sputum Induced      ESCHERICHIA COLI     Antibiotic Interpretation GAGANDEEP Unit   ampicillin Sensitive <=4 mcg/mL   ceFAZolin Sensitive 2 mcg/mL   Comment: NOTE: Cefazolin should only be used for uncomplicated UTI        for E.coli, Klebsiella pneumoniae or Proteus mirabilis. cefTRIAXone Sensitive <=1 mcg/mL   cefepime Sensitive <=1 mcg/mL   ciprofloxacin Resistant >2 mcg/mL   gentamicin Sensitive <=2 mcg/mL   levofloxacin Resistant >4 mcg/mL   meropenem Sensitive <=0.25 mcg/mL   nitrofurantoin Sensitive <=16 mcg/mL   piperacillin-tazobactam Sensitive <=2/4 mcg/mL   tetracycline Resistant >8 mcg/mL   trimethoprim-sulfamethoxazole Resistant >2/38 mcg/mL           IMAGING:    XR CHEST PORTABLE   Preliminary Result   1. Right upper extremity PICC terminates in the lower SVC. 2. Otherwise stable chest.         CT Head WO Contrast   Final Result   No acute intracranial abnormality. XR CHEST PORTABLE   Final Result   Increased bibasilar airspace opacities which may be infectious. Consider   follow-up imaging to resolution.                All the pertinent images and reports for the current Hospitalization were reviewed by me     Scheduled Meds:   lidocaine 1 % injection  5 mL Intradermal Once    meropenem  500 mg Intravenous Q8H    levothyroxine  75 mcg Oral Daily    aspirin  81 mg Oral Daily    simvastatin  10 mg Oral Nightly    oxybutynin  5 mg Oral TID    potassium chloride  20 mEq Oral Daily    lisinopril  10 mg Oral Daily    furosemide  20 mg Oral Daily    insulin glargine  25 Units Subcutaneous Nightly    sodium chloride flush  10 mL Intravenous 2 times per day    enoxaparin  40 mg Subcutaneous Daily    insulin lispro  0-12 Units Subcutaneous TID WC    insulin lispro  0-6 Units Subcutaneous Nightly    polyethylene glycol  17 g Oral Daily       Continuous Infusions:   dextrose         PRN Meds:  sodium chloride flush, LORazepam, polyethyl glycol-propyl glycol 0.4-0.3 %, magnesium hydroxide, ondansetron, acetaminophen, albuterol, glucose, dextrose, glucagon

## 2018-09-24 NOTE — DISCHARGE SUMMARY
Hospital Medicine Discharge Summary    Patient ID: Marybel Blanchard      Patient's PCP: Rd Toledo MD    Admit Date: 9/19/2018     Discharge Date:  9/24/2018    Admitting Physician: Tari Leyva DO     Discharge Physician: Norma Ortiz MD     Discharge Diagnoses: Active Hospital Problems    Diagnosis Date Noted    Moderate malnutrition ([facility] [E44.0] 09/21/2018    Acute cystitis without hematuria [N30.00]     Dehydration [E86.0]     Delirium [R41.0]     Fever [R50.9]     Fever chills [R50.9]     Fall [W19. XXXA]     CAP (community acquired pneumonia) [J18.9] 53/15/9116    Complicated UTI (urinary tract infection) [N39.0] 12/10/2016    Sepsis ([facility] [A41.9] 09/26/2016       The patient was seen and examined on day of discharge and this discharge summary is in conjunction with any daily progress note from day of discharge. Hospital Course: 79yo man presented with AMS, lethargy. He has chronic suprapubic catheter and sees a physician monthly to have this monitored and changed. Sepsis due to bacteremia - source presumed UTI - improved. Complicated  E coli UTI associated with chronic indwelling  suprapubic catheter and bacteremia. Catheter exchanged 9/20 by urology  Plan for 7 days IV Ertapenem per ID recs due to multiple allergies. E. Coli bacteremia source presumed UTI      Acute metabolic encephalopathy associated with sepsis - resolved, mental status back to baseline. Diabetes mellitus type 2 - resume PTA regimen on discharge.        Moderate oropharyngeal dysphagia - on modifed diet      Essential HTN - uncontrolled      Hypothyroidism-on synthroid      hx CVA-- on asa,statin            Physical Exam Performed:     /65   Pulse 96   Temp 98.3 °F (36.8 °C) (Axillary)   Resp 18   Ht 5' 11.5\" (1.816 m)   Wt 166 lb 0.1 oz (75.3 kg)   SpO2 96%   BMI 22.83 kg/m²          General appearance: No apparent distress, appears stated age and cooperative. HEENT: Pupils equal, round, and reactive to light. Conjunctivae/corneas clear. Neck: Supple, with full range of motion. No jugular venous distention. Trachea midline. Respiratory:  Normal respiratory effort. Clear to auscultation, bilaterally without Rales/Wheezes/Rhonchi. Cardiovascular: Regular rate and rhythm with normal S1/S2 without murmurs, rubs or gallops. Abdomen: Soft, non-tender, non-distended with normal bowel sounds. Musculoskeletal: No clubbing, cyanosis or edema bilaterally. Full range of motion without deformity. Skin: Skin color, texture, turgor normal.  No rashes or lesions. Neurologic:  Neurovascularly intact without any focal sensory/motor deficits. Cranial nerves: II-XII intact, grossly non-focal.  Capillary Refill: Brisk,< 3 seconds   Peripheral Pulses: +2 palpable, equal bilaterally        Labs: For convenience and continuity at follow-up the following most recent labs are provided:      CBC:    Lab Results   Component Value Date    WBC 12.1 09/23/2018    HGB 11.9 09/23/2018    HCT 36.8 09/23/2018     09/23/2018       Renal:    Lab Results   Component Value Date     09/24/2018    K 4.1 09/24/2018    CL 99 09/24/2018    CO2 32 09/24/2018    BUN 19 09/24/2018    CREATININE 0.6 09/24/2018    CALCIUM 9.5 09/24/2018    PHOS 3.2 09/26/2016         Significant Diagnostic Studies    Radiology:   XR CHEST PORTABLE   Preliminary Result   1. Right upper extremity PICC terminates in the lower SVC. 2. Otherwise stable chest.         CT Head WO Contrast   Final Result   No acute intracranial abnormality. XR CHEST PORTABLE   Final Result   Increased bibasilar airspace opacities which may be infectious. Consider   follow-up imaging to resolution.                 Consults:     IP CONSULT TO HOSPITALIST  IP CONSULT TO CASE MANAGEMENT  IP CONSULT TO INFECTIOUS DISEASES  IP CONSULT TO DIETITIAN    Disposition:  ECF     Condition at Discharge: Stable    Discharge

## 2018-09-24 NOTE — PLAN OF CARE
Problem: Falls - Risk of:  Goal: Will remain free from falls  Will remain free from falls     Outcome: Ongoing  Fall risk assessment completed every shift. All precautions in place. Pt has call light within reach at all times. Room clear of clutter. Pt aware to call for assistance when getting up. Goal: Absence of physical injury  Absence of physical injury     Outcome: Ongoing  No signs of physical injury. Problem: Confusion - Acute:  Goal: Absence of continued neurological deterioration signs and symptoms    Absence of continued neurological deterioration signs and symptoms   Outcome: Ongoing  Pt is alert to person, able to make needs known. Pt is very grumpy and cantankerous. Goal: Mental status will be restored to baseline    Mental status will be restored to baseline   Outcome: Ongoing  . Problem: Discharge Planning:  Goal: Ability to perform activities of daily living will improve  Ability to perform activities of daily living will improve    Outcome: Ongoing  Pt is dependent on staff for personal care. Goal: Participates in care planning  Participates in care planning    Outcome: Ongoing  Planned is for pt to be discharged today. Problem: Injury - Risk of, Physical Injury:  Goal: Absence of physical injury  Absence of physical injury     Outcome: Ongoing  No signs of physical injury. Goal: Will remain free from falls  Will remain free from falls     Outcome: Ongoing  Fall risk assessment completed every shift. All precautions in place. Pt has call light within reach at all times. Room clear of clutter. Pt aware to call for assistance when getting up. Problem: Mood - Altered:  Goal: Mood stable  Mood stable    Outcome: Ongoing  Pt is cantakerous. Goal: Absence of abusive behavior  Absence of abusive behavior    Outcome: Ongoing  No signs of abusive behavior.     Problem: Psychomotor Activity - Altered:  Goal: Absence of psychomotor disturbance signs and symptoms  Absence of psychomotor disturbance signs and symptoms    Outcome: Ongoing  . Problem: Sensory Perception - Impaired:  Goal: Demonstrations of improved sensory functioning will increase  Demonstrations of improved sensory functioning will increase    Outcome: Ongoing  . Goal: Decrease in sensory misperception frequency  Decrease in sensory misperception frequency    Outcome: Ongoing  . Goal: Able to refrain from responding to false sensory perceptions  Able to refrain from responding to false sensory perceptions    Outcome: Ongoing  . Goal: Demonstrates accurate environmental perceptions  Demonstrates accurate environmental perceptions    Outcome: Ongoing  . Goal: Able to distinguish between reality-based and nonreality-based thinking  Able to distinguish between reality-based and nonreality-based thinking    Outcome: Ongoing  . Goal: Able to interrupt nonreality-based thinking  Able to interrupt nonreality-based thinking    Outcome: Ongoing  . Problem: Sleep Pattern Disturbance:  Goal: Appears well-rested  Appears well-rested    Outcome: Ongoing  Pt appears well rested. Problem: Airway Clearance - Ineffective:  Goal: Clear lung sounds  Clear lung sounds   Outcome: Ongoing  Lung sounds are diminished throughout. Goal: Ability to maintain a clear airway will improve  Ability to maintain a clear airway will improve   Outcome: Ongoing  Minimal cough noted. Problem: Urinary Elimination:  Goal: Signs and symptoms of infection will decrease  Signs and symptoms of infection will decrease   Outcome: Ongoing      Problem: Nutrition  Goal: Optimal nutrition therapy  Outcome: Ongoing  Appetite is fair.

## 2018-09-24 NOTE — PROGRESS NOTES
Physical Therapy  Facility/Department: [address] PROGRESSIVE CARE  Daily Treatment Note  This note serves as D/C summary if patient is discharged prior to next treatment session. Assessment: Pt required Min-Mod a to stand from chair in multpile attempts d/t LE weakness, and poor trunk control. He maintained balance during ambulation with CGA, but moved very slowly, and c/o feeling unsteady. He would benefit from continued therapy to improve his independence with mobility tasks, and maximize his strength. Continue to recommend low-frequency therapy upon D/C. Katia Muller scored a 15 on the AM-PAC short mobility form. Current research shows that an AM-PAC score of 17 or less is typically not associated with a discharge to the patient's home setting. Based on the patients AM-PAC score and their current functional mobility deficits, it is recommended that the patient have 3-5 sessions per week of Physical Therapy at d/c to increase the patients independence. NAME: Katia Muller  : 1924  MRN: [id number]    Date of Service: 2018    Discharge Recommendations:  Patient would benefit from continued therapy after discharge, Continue to assess pending progress   PT Equipment Recommendations  Equipment Needed: No    Patient Diagnosis(es): The primary encounter diagnosis was Complicated UTI (urinary tract infection). Diagnoses of Pneumonia due to organism, Fever, unspecified fever cause, Delirium, and Dehydration were also pertinent to this visit. has a past medical history of Anemia; Arthritis; Benign prostate hyperplasia; Cerebral artery occlusion with cerebral infarction ([address] Chronic kidney disease; COPD (chronic obstructive pulmonary disease) ([address] Diabetes mellitus ([address] ESBL (extended spectrum beta-lactamase) producing bacteria infection; Hyperlipidemia; Hypertension; Pneumonia; Schizophrenia ([address] Thyroid disease; and Urine retention.    has a past surgical history that includes posture and stability this morning, maintaining balance throughout ambulation with CGA, cues for RW navigation. Distance: 28'  Comments: Very slow; limited d/t fatigue     Exercises  Comments: Sit < > stand x 3 from chair to RW; cues for UE placement, Min-Mod A to correct posterior lean      Other Activities: Other (see comment)  Comment: Pt required max cueing for safe mobility technique for transfers and ambulation. Pt required increased time to complete all mobility tasks (moves very slowly; requires repeated commands d/t [employer]). At end of session he was positioned for comfort in bedside chair, call light in reach, alarm in place. Assessment   Body structures, Functions, Activity limitations: Decreased functional mobility ; Decreased strength;Decreased balance;Decreased endurance;Decreased safe awareness  Assessment: Pt required Min-Mod a to stand from chair in multpile attempts d/t LE weakness, and poor trunk control. He maintained balance during ambulation with CGA, but moved very slowly, and c/o feeling unsteady. He would benefit from continued therapy to improve his independence with mobility tasks, and maximize his strength. Continue to recommend low-frequency therapy upon D/C. Treatment Diagnosis: Decreased functional mobility; Decreased endurance  Specific instructions for Next Treatment: Practiced bed mobility and transfers  Prognosis: Fair;Good  Activity Tolerance  Activity Tolerance: Patient Tolerated treatment well;Patient limited by fatigue;Patient limited by endurance     G-Code  PT G-Codes  Functional Assessment Tool Used: [facility] mobility inpatient  Score: 15  Functional Limitation: Mobility: Walking and moving around  Mobility: Walking and Moving Around Current Status (): At least 40 percent but less than 60 percent impaired, limited or restricted  Mobility: Walking and Moving Around Goal Status ():  At least 20 percent but less than 40 percent impaired, limited or

## 2018-09-25 LAB
BLOOD CULTURE, ROUTINE: NORMAL
CULTURE, BLOOD 2: NORMAL

## 2018-09-28 ENCOUNTER — APPOINTMENT (OUTPATIENT)
Dept: GENERAL RADIOLOGY | Age: 83
DRG: 698 | End: 2018-09-28
Payer: MEDICARE

## 2018-09-28 ENCOUNTER — HOSPITAL ENCOUNTER (INPATIENT)
Age: 83
LOS: 2 days | Discharge: NURSING, REHAB OR LONG-TERM CARE | DRG: 698 | End: 2018-10-01
Attending: EMERGENCY MEDICINE | Admitting: INTERNAL MEDICINE
Payer: MEDICARE

## 2018-09-28 DIAGNOSIS — N30.00 ACUTE CYSTITIS WITHOUT HEMATURIA: Primary | ICD-10-CM

## 2018-09-28 DIAGNOSIS — A41.9 SEPSIS, DUE TO UNSPECIFIED ORGANISM: ICD-10-CM

## 2018-09-28 PROCEDURE — 82271 OCCULT BLOOD OTHER SOURCES: CPT

## 2018-09-28 PROCEDURE — 87086 URINE CULTURE/COLONY COUNT: CPT

## 2018-09-28 PROCEDURE — 71045 X-RAY EXAM CHEST 1 VIEW: CPT

## 2018-09-28 PROCEDURE — 93005 ELECTROCARDIOGRAM TRACING: CPT | Performed by: PHYSICIAN ASSISTANT

## 2018-09-28 PROCEDURE — 99291 CRITICAL CARE FIRST HOUR: CPT

## 2018-09-29 ENCOUNTER — TELEPHONE (OUTPATIENT)
Dept: OTHER | Facility: CLINIC | Age: 83
End: 2018-09-29

## 2018-09-29 ENCOUNTER — APPOINTMENT (OUTPATIENT)
Dept: GENERAL RADIOLOGY | Age: 83
DRG: 698 | End: 2018-09-29
Payer: MEDICARE

## 2018-09-29 PROBLEM — J18.9 CAP (COMMUNITY ACQUIRED PNEUMONIA): Status: RESOLVED | Noted: 2018-09-19 | Resolved: 2018-09-29

## 2018-09-29 PROBLEM — L08.9 FOOT INFECTION: Status: ACTIVE | Noted: 2018-09-29

## 2018-09-29 PROBLEM — M00.9 SEPTIC ARTHRITIS (HCC): Status: RESOLVED | Noted: 2018-09-19 | Resolved: 2018-09-29

## 2018-09-29 PROBLEM — E16.2 HYPOGLYCEMIA: Status: RESOLVED | Noted: 2017-04-10 | Resolved: 2018-09-29

## 2018-09-29 LAB
A/G RATIO: 0.8 (ref 1.1–2.2)
ALBUMIN SERPL-MCNC: 3 G/DL (ref 3.4–5)
ALP BLD-CCNC: 126 U/L (ref 40–129)
ALT SERPL-CCNC: 27 U/L (ref 10–40)
ANION GAP SERPL CALCULATED.3IONS-SCNC: 7 MMOL/L (ref 3–16)
AST SERPL-CCNC: 31 U/L (ref 15–37)
BASOPHILS ABSOLUTE: 0.1 K/UL (ref 0–0.2)
BASOPHILS RELATIVE PERCENT: 0.2 %
BILIRUB SERPL-MCNC: 1.2 MG/DL (ref 0–1)
BILIRUBIN URINE: NEGATIVE
BLOOD, URINE: ABNORMAL
BUN BLDV-MCNC: 17 MG/DL (ref 7–20)
CALCIUM SERPL-MCNC: 9.2 MG/DL (ref 8.3–10.6)
CHLORIDE BLD-SCNC: 94 MMOL/L (ref 99–110)
CLARITY: ABNORMAL
CO2: 31 MMOL/L (ref 21–32)
COLOR: YELLOW
CREAT SERPL-MCNC: 0.5 MG/DL (ref 0.8–1.3)
EKG ATRIAL RATE: 107 BPM
EKG DIAGNOSIS: NORMAL
EKG P AXIS: 30 DEGREES
EKG P-R INTERVAL: 196 MS
EKG Q-T INTERVAL: 328 MS
EKG QRS DURATION: 94 MS
EKG QTC CALCULATION (BAZETT): 437 MS
EKG R AXIS: 9 DEGREES
EKG T AXIS: 32 DEGREES
EKG VENTRICULAR RATE: 107 BPM
EOSINOPHILS ABSOLUTE: 0.1 K/UL (ref 0–0.6)
EOSINOPHILS RELATIVE PERCENT: 0.6 %
EPITHELIAL CELLS, UA: 0 /HPF (ref 0–5)
GFR AFRICAN AMERICAN: >60
GFR NON-AFRICAN AMERICAN: >60
GLOBULIN: 3.7 G/DL
GLUCOSE BLD-MCNC: 190 MG/DL (ref 70–99)
GLUCOSE BLD-MCNC: 199 MG/DL (ref 70–99)
GLUCOSE BLD-MCNC: 205 MG/DL (ref 70–99)
GLUCOSE BLD-MCNC: 217 MG/DL (ref 70–99)
GLUCOSE BLD-MCNC: 304 MG/DL (ref 70–99)
GLUCOSE BLD-MCNC: 334 MG/DL (ref 70–99)
GLUCOSE URINE: NEGATIVE MG/DL
HCT VFR BLD CALC: 34 % (ref 40.5–52.5)
HEMOGLOBIN: 11.4 G/DL (ref 13.5–17.5)
HYALINE CASTS: 2 /LPF (ref 0–8)
KETONES, URINE: NEGATIVE MG/DL
LACTIC ACID, SEPSIS: 0.9 MMOL/L (ref 0.4–1.9)
LACTIC ACID: 0.9 MMOL/L (ref 0.4–2)
LEUKOCYTE ESTERASE, URINE: ABNORMAL
LYMPHOCYTES ABSOLUTE: 1.2 K/UL (ref 1–5.1)
LYMPHOCYTES RELATIVE PERCENT: 5.6 %
MCH RBC QN AUTO: 31.1 PG (ref 26–34)
MCHC RBC AUTO-ENTMCNC: 33.5 G/DL (ref 31–36)
MCV RBC AUTO: 92.7 FL (ref 80–100)
MICROSCOPIC EXAMINATION: YES
MONOCYTES ABSOLUTE: 1.5 K/UL (ref 0–1.3)
MONOCYTES RELATIVE PERCENT: 6.8 %
NEUTROPHILS ABSOLUTE: 18.7 K/UL (ref 1.7–7.7)
NEUTROPHILS RELATIVE PERCENT: 86.8 %
NITRITE, URINE: NEGATIVE
PDW BLD-RTO: 14.9 % (ref 12.4–15.4)
PERFORMED ON: ABNORMAL
PH UA: 7.5
PLATELET # BLD: 472 K/UL (ref 135–450)
PMV BLD AUTO: 7.2 FL (ref 5–10.5)
POTASSIUM SERPL-SCNC: 4.7 MMOL/L (ref 3.5–5.1)
PROTEIN UA: 30 MG/DL
RBC # BLD: 3.66 M/UL (ref 4.2–5.9)
RBC UA: 3 /HPF (ref 0–4)
SODIUM BLD-SCNC: 132 MMOL/L (ref 136–145)
SPECIFIC GRAVITY UA: 1.01
TOTAL PROTEIN: 6.7 G/DL (ref 6.4–8.2)
URINE REFLEX TO CULTURE: YES
URINE TYPE: ABNORMAL
UROBILINOGEN, URINE: 2 E.U./DL
WBC # BLD: 21.5 K/UL (ref 4–11)
WBC UA: 109 /HPF (ref 0–5)

## 2018-09-29 PROCEDURE — 2580000003 HC RX 258: Performed by: EMERGENCY MEDICINE

## 2018-09-29 PROCEDURE — 96367 TX/PROPH/DG ADDL SEQ IV INF: CPT

## 2018-09-29 PROCEDURE — 6360000002 HC RX W HCPCS: Performed by: INTERNAL MEDICINE

## 2018-09-29 PROCEDURE — 81001 URINALYSIS AUTO W/SCOPE: CPT

## 2018-09-29 PROCEDURE — 92526 ORAL FUNCTION THERAPY: CPT

## 2018-09-29 PROCEDURE — 93010 ELECTROCARDIOGRAM REPORT: CPT | Performed by: INTERNAL MEDICINE

## 2018-09-29 PROCEDURE — G8997 SWALLOW GOAL STATUS: HCPCS

## 2018-09-29 PROCEDURE — 87040 BLOOD CULTURE FOR BACTERIA: CPT

## 2018-09-29 PROCEDURE — 71045 X-RAY EXAM CHEST 1 VIEW: CPT

## 2018-09-29 PROCEDURE — G8996 SWALLOW CURRENT STATUS: HCPCS

## 2018-09-29 PROCEDURE — 2580000003 HC RX 258: Performed by: INTERNAL MEDICINE

## 2018-09-29 PROCEDURE — 85025 COMPLETE CBC W/AUTO DIFF WBC: CPT

## 2018-09-29 PROCEDURE — 6370000000 HC RX 637 (ALT 250 FOR IP): Performed by: INTERNAL MEDICINE

## 2018-09-29 PROCEDURE — 2580000003 HC RX 258: Performed by: PHYSICIAN ASSISTANT

## 2018-09-29 PROCEDURE — 83605 ASSAY OF LACTIC ACID: CPT

## 2018-09-29 PROCEDURE — 92610 EVALUATE SWALLOWING FUNCTION: CPT

## 2018-09-29 PROCEDURE — 1200000000 HC SEMI PRIVATE

## 2018-09-29 PROCEDURE — 6360000002 HC RX W HCPCS: Performed by: PHYSICIAN ASSISTANT

## 2018-09-29 PROCEDURE — 96365 THER/PROPH/DIAG IV INF INIT: CPT

## 2018-09-29 PROCEDURE — 80053 COMPREHEN METABOLIC PANEL: CPT

## 2018-09-29 RX ORDER — LORAZEPAM 0.5 MG/1
0.5 TABLET ORAL NIGHTLY PRN
Status: DISCONTINUED | OUTPATIENT
Start: 2018-09-29 | End: 2018-10-01 | Stop reason: HOSPADM

## 2018-09-29 RX ORDER — NICOTINE POLACRILEX 4 MG
15 LOZENGE BUCCAL PRN
Status: DISCONTINUED | OUTPATIENT
Start: 2018-09-29 | End: 2018-10-01 | Stop reason: HOSPADM

## 2018-09-29 RX ORDER — SODIUM CHLORIDE 9 MG/ML
INJECTION, SOLUTION INTRAVENOUS CONTINUOUS
Status: DISCONTINUED | OUTPATIENT
Start: 2018-09-29 | End: 2018-10-01 | Stop reason: HOSPADM

## 2018-09-29 RX ORDER — DEXTROSE MONOHYDRATE 25 G/50ML
12.5 INJECTION, SOLUTION INTRAVENOUS PRN
Status: DISCONTINUED | OUTPATIENT
Start: 2018-09-29 | End: 2018-10-01 | Stop reason: HOSPADM

## 2018-09-29 RX ORDER — LISINOPRIL 10 MG/1
10 TABLET ORAL DAILY
Status: DISCONTINUED | OUTPATIENT
Start: 2018-09-29 | End: 2018-10-01 | Stop reason: HOSPADM

## 2018-09-29 RX ORDER — ASPIRIN 81 MG/1
81 TABLET, CHEWABLE ORAL DAILY
Status: DISCONTINUED | OUTPATIENT
Start: 2018-09-29 | End: 2018-10-01 | Stop reason: HOSPADM

## 2018-09-29 RX ORDER — TEMAZEPAM 15 MG/1
30 CAPSULE ORAL NIGHTLY PRN
Status: DISCONTINUED | OUTPATIENT
Start: 2018-09-29 | End: 2018-09-29 | Stop reason: CLARIF

## 2018-09-29 RX ORDER — FUROSEMIDE 20 MG/1
20 TABLET ORAL DAILY
Status: DISCONTINUED | OUTPATIENT
Start: 2018-09-29 | End: 2018-10-01 | Stop reason: HOSPADM

## 2018-09-29 RX ORDER — OXYBUTYNIN CHLORIDE 5 MG/1
5 TABLET ORAL 3 TIMES DAILY
Status: DISCONTINUED | OUTPATIENT
Start: 2018-09-29 | End: 2018-10-01 | Stop reason: HOSPADM

## 2018-09-29 RX ORDER — CIPROFLOXACIN 2 MG/ML
400 INJECTION, SOLUTION INTRAVENOUS EVERY 12 HOURS
Status: DISCONTINUED | OUTPATIENT
Start: 2018-09-29 | End: 2018-09-30

## 2018-09-29 RX ORDER — SODIUM CHLORIDE 0.9 % (FLUSH) 0.9 %
10 SYRINGE (ML) INJECTION PRN
Status: DISCONTINUED | OUTPATIENT
Start: 2018-09-29 | End: 2018-10-01 | Stop reason: HOSPADM

## 2018-09-29 RX ORDER — 0.9 % SODIUM CHLORIDE 0.9 %
1000 INTRAVENOUS SOLUTION INTRAVENOUS ONCE
Status: COMPLETED | OUTPATIENT
Start: 2018-09-29 | End: 2018-09-29

## 2018-09-29 RX ORDER — SIMVASTATIN 10 MG
10 TABLET ORAL NIGHTLY
Status: DISCONTINUED | OUTPATIENT
Start: 2018-09-29 | End: 2018-10-01 | Stop reason: HOSPADM

## 2018-09-29 RX ORDER — DEXTROSE MONOHYDRATE 50 MG/ML
100 INJECTION, SOLUTION INTRAVENOUS PRN
Status: DISCONTINUED | OUTPATIENT
Start: 2018-09-29 | End: 2018-10-01 | Stop reason: HOSPADM

## 2018-09-29 RX ORDER — LEVOTHYROXINE SODIUM 0.07 MG/1
75 TABLET ORAL DAILY
Status: DISCONTINUED | OUTPATIENT
Start: 2018-09-29 | End: 2018-10-01 | Stop reason: HOSPADM

## 2018-09-29 RX ORDER — POLYETHYLENE GLYCOL 3350 17 G/17G
17 POWDER, FOR SOLUTION ORAL DAILY
Status: DISCONTINUED | OUTPATIENT
Start: 2018-09-29 | End: 2018-10-01 | Stop reason: HOSPADM

## 2018-09-29 RX ORDER — SODIUM CHLORIDE 0.9 % (FLUSH) 0.9 %
10 SYRINGE (ML) INJECTION EVERY 12 HOURS SCHEDULED
Status: DISCONTINUED | OUTPATIENT
Start: 2018-09-29 | End: 2018-10-01 | Stop reason: HOSPADM

## 2018-09-29 RX ORDER — VANCOMYCIN HYDROCHLORIDE 1 G/200ML
1000 INJECTION, SOLUTION INTRAVENOUS ONCE
Status: COMPLETED | OUTPATIENT
Start: 2018-09-29 | End: 2018-09-29

## 2018-09-29 RX ADMIN — INSULIN GLARGINE 25 UNITS: 100 INJECTION, SOLUTION SUBCUTANEOUS at 23:05

## 2018-09-29 RX ADMIN — MEROPENEM 1 G: 1 INJECTION, POWDER, FOR SOLUTION INTRAVENOUS at 01:49

## 2018-09-29 RX ADMIN — LORAZEPAM 0.5 MG: 0.5 TABLET ORAL at 21:24

## 2018-09-29 RX ADMIN — POTASSIUM BICARBONATE 20 MEQ: 782 TABLET, EFFERVESCENT ORAL at 09:20

## 2018-09-29 RX ADMIN — VANCOMYCIN HYDROCHLORIDE 1000 MG: 1 INJECTION, SOLUTION INTRAVENOUS at 03:00

## 2018-09-29 RX ADMIN — OXYBUTYNIN CHLORIDE 5 MG: 5 TABLET ORAL at 21:24

## 2018-09-29 RX ADMIN — SIMVASTATIN 10 MG: 10 TABLET, FILM COATED ORAL at 21:24

## 2018-09-29 RX ADMIN — CIPROFLOXACIN 400 MG: 2 INJECTION, SOLUTION INTRAVENOUS at 18:14

## 2018-09-29 RX ADMIN — OXYBUTYNIN CHLORIDE 5 MG: 5 TABLET ORAL at 09:20

## 2018-09-29 RX ADMIN — Medication 10 ML: at 09:55

## 2018-09-29 RX ADMIN — FUROSEMIDE 20 MG: 20 TABLET ORAL at 09:20

## 2018-09-29 RX ADMIN — CIPROFLOXACIN 400 MG: 2 INJECTION, SOLUTION INTRAVENOUS at 04:56

## 2018-09-29 RX ADMIN — SODIUM CHLORIDE: 9 INJECTION, SOLUTION INTRAVENOUS at 04:56

## 2018-09-29 RX ADMIN — OXYBUTYNIN CHLORIDE 5 MG: 5 TABLET ORAL at 14:04

## 2018-09-29 RX ADMIN — SODIUM CHLORIDE: 9 INJECTION, SOLUTION INTRAVENOUS at 18:11

## 2018-09-29 RX ADMIN — INSULIN LISPRO 8 UNITS: 100 INJECTION, SOLUTION INTRAVENOUS; SUBCUTANEOUS at 08:00

## 2018-09-29 RX ADMIN — INSULIN LISPRO 2 UNITS: 100 INJECTION, SOLUTION INTRAVENOUS; SUBCUTANEOUS at 23:06

## 2018-09-29 RX ADMIN — MEROPENEM 1 G: 1 INJECTION, POWDER, FOR SOLUTION INTRAVENOUS at 14:04

## 2018-09-29 RX ADMIN — SODIUM CHLORIDE 1000 ML: 9 INJECTION, SOLUTION INTRAVENOUS at 01:49

## 2018-09-29 RX ADMIN — POLYETHYLENE GLYCOL 3350 17 G: 17 POWDER, FOR SOLUTION ORAL at 09:20

## 2018-09-29 RX ADMIN — ASPIRIN 81 MG: 81 TABLET, CHEWABLE ORAL at 09:20

## 2018-09-29 RX ADMIN — VANCOMYCIN HYDROCHLORIDE 1250 MG: 10 INJECTION, POWDER, LYOPHILIZED, FOR SOLUTION INTRAVENOUS at 21:24

## 2018-09-29 RX ADMIN — LISINOPRIL 10 MG: 10 TABLET ORAL at 09:20

## 2018-09-29 RX ADMIN — INSULIN LISPRO 8 UNITS: 100 INJECTION, SOLUTION INTRAVENOUS; SUBCUTANEOUS at 18:33

## 2018-09-29 ASSESSMENT — PAIN SCALES - GENERAL
PAINLEVEL_OUTOF10: 0

## 2018-09-29 ASSESSMENT — ENCOUNTER SYMPTOMS
VOMITING: 0
ABDOMINAL PAIN: 0
COUGH: 0
SHORTNESS OF BREATH: 0
NAUSEA: 0
DIARRHEA: 0
RHINORRHEA: 0

## 2018-09-29 NOTE — PROGRESS NOTES
completely clear penetrated liquid with completion of swallow. The pt also tolerated smaller sips of thin liquids x3 with no penetration. · Pt demonstrated functional swallow for NTL , HTL, and puree consistencies. · On [facility] soft solids, the pt had valleculae residue- which he was able to clear with liquid wash and double swallow.      REC continue on NECTAR thick liquids and DDII (however per pt- it seems he likes to select puree consistencies)  SLP for dysphagia tx and trials of thins with small sips. Pt may be appropriate for water protocol with tx. Clinical Swallow Evaluation orders received and pt seen with RN consent. At this time, pt is on a Regular textured diet with Thin liquids. RN reports pt has only taken 1-2 bites of purees this morning. Impressions:Pt presents with a moderate oropharyngeal dysphagia characterized by reduced bolus control, reduced and prolonged AP transit, absent mastication of soft solids (no rotary or munching pattern observed), anterior bolus expulsion, s/s of premature bolus loss to pharynx, delayed swallow initiation, reduced laryngeal excursion via digital palpation, s/s of decreased pharyngeal clearing evidenced by multiple swallows utilized for each trial, and inconsistent throat clearing/coughing following thin liquid trials. Pt seen bedside asleep and arouses to name. Pt has bilateral hearing aids and continues to present [facility]. Noted right sided facial droop at rest. Pt is oriented to self and location. Pt very confused. Pt inconsistently followed simple oral commands with models provided during oral [facility] exam. Pt unable to complete volitional cough. Volitional swallow is delayed and laryngeal excursion is reduced. Oral strength/coordination/ROM is moderately reduced. Pt has upper and lower dentures. Pt refusing oral care at this time.  Ice chips/Tsp thin/Cup sip thin trials provided all of which revealed poor bolus control, s/s of premature bolus loss to the

## 2018-09-30 LAB
ANION GAP SERPL CALCULATED.3IONS-SCNC: 9 MMOL/L (ref 3–16)
BUN BLDV-MCNC: 13 MG/DL (ref 7–20)
CALCIUM SERPL-MCNC: 8.6 MG/DL (ref 8.3–10.6)
CHLORIDE BLD-SCNC: 98 MMOL/L (ref 99–110)
CO2: 30 MMOL/L (ref 21–32)
CREAT SERPL-MCNC: 0.5 MG/DL (ref 0.8–1.3)
GFR AFRICAN AMERICAN: >60
GFR NON-AFRICAN AMERICAN: >60
GLUCOSE BLD-MCNC: 111 MG/DL (ref 70–99)
GLUCOSE BLD-MCNC: 128 MG/DL (ref 70–99)
GLUCOSE BLD-MCNC: 144 MG/DL (ref 70–99)
GLUCOSE BLD-MCNC: 228 MG/DL (ref 70–99)
GLUCOSE BLD-MCNC: 268 MG/DL (ref 70–99)
GLUCOSE BLD-MCNC: 316 MG/DL (ref 70–99)
HCT VFR BLD CALC: 33 % (ref 40.5–52.5)
HEMOGLOBIN: 11 G/DL (ref 13.5–17.5)
MCH RBC QN AUTO: 31.1 PG (ref 26–34)
MCHC RBC AUTO-ENTMCNC: 33.3 G/DL (ref 31–36)
MCV RBC AUTO: 93.4 FL (ref 80–100)
ORGANISM: ABNORMAL
PDW BLD-RTO: 15.1 % (ref 12.4–15.4)
PERFORMED ON: ABNORMAL
PLATELET # BLD: 418 K/UL (ref 135–450)
PMV BLD AUTO: 6.8 FL (ref 5–10.5)
POTASSIUM SERPL-SCNC: 3.9 MMOL/L (ref 3.5–5.1)
RBC # BLD: 3.53 M/UL (ref 4.2–5.9)
SODIUM BLD-SCNC: 137 MMOL/L (ref 136–145)
URINE CULTURE, ROUTINE: ABNORMAL
URINE CULTURE, ROUTINE: ABNORMAL
WBC # BLD: 10.6 K/UL (ref 4–11)

## 2018-09-30 PROCEDURE — 2580000003 HC RX 258: Performed by: INTERNAL MEDICINE

## 2018-09-30 PROCEDURE — 6370000000 HC RX 637 (ALT 250 FOR IP): Performed by: INTERNAL MEDICINE

## 2018-09-30 PROCEDURE — 1200000000 HC SEMI PRIVATE

## 2018-09-30 PROCEDURE — 36415 COLL VENOUS BLD VENIPUNCTURE: CPT

## 2018-09-30 PROCEDURE — 6360000002 HC RX W HCPCS: Performed by: INTERNAL MEDICINE

## 2018-09-30 PROCEDURE — 80048 BASIC METABOLIC PNL TOTAL CA: CPT

## 2018-09-30 PROCEDURE — 85027 COMPLETE CBC AUTOMATED: CPT

## 2018-09-30 PROCEDURE — 0HBRXZZ EXCISION OF TOE NAIL, EXTERNAL APPROACH: ICD-10-PCS | Performed by: PODIATRIST

## 2018-09-30 PROCEDURE — 2580000003 HC RX 258: Performed by: EMERGENCY MEDICINE

## 2018-09-30 RX ADMIN — INSULIN LISPRO 2 UNITS: 100 INJECTION, SOLUTION INTRAVENOUS; SUBCUTANEOUS at 08:38

## 2018-09-30 RX ADMIN — INSULIN GLARGINE 25 UNITS: 100 INJECTION, SOLUTION SUBCUTANEOUS at 21:25

## 2018-09-30 RX ADMIN — ASPIRIN 81 MG: 81 TABLET, CHEWABLE ORAL at 08:36

## 2018-09-30 RX ADMIN — LISINOPRIL 10 MG: 10 TABLET ORAL at 08:36

## 2018-09-30 RX ADMIN — OXYBUTYNIN CHLORIDE 5 MG: 5 TABLET ORAL at 15:02

## 2018-09-30 RX ADMIN — MEROPENEM 1 G: 1 INJECTION, POWDER, FOR SOLUTION INTRAVENOUS at 15:02

## 2018-09-30 RX ADMIN — SODIUM CHLORIDE: 9 INJECTION, SOLUTION INTRAVENOUS at 10:26

## 2018-09-30 RX ADMIN — LORAZEPAM 0.5 MG: 0.5 TABLET ORAL at 23:57

## 2018-09-30 RX ADMIN — INSULIN LISPRO 6 UNITS: 100 INJECTION, SOLUTION INTRAVENOUS; SUBCUTANEOUS at 18:11

## 2018-09-30 RX ADMIN — OXYBUTYNIN CHLORIDE 5 MG: 5 TABLET ORAL at 08:36

## 2018-09-30 RX ADMIN — POLYETHYLENE GLYCOL 3350 17 G: 17 POWDER, FOR SOLUTION ORAL at 08:36

## 2018-09-30 RX ADMIN — VANCOMYCIN HYDROCHLORIDE 1250 MG: 10 INJECTION, POWDER, LYOPHILIZED, FOR SOLUTION INTRAVENOUS at 06:11

## 2018-09-30 RX ADMIN — Medication 10 ML: at 08:37

## 2018-09-30 RX ADMIN — LEVOTHYROXINE SODIUM 75 MCG: 75 TABLET ORAL at 06:52

## 2018-09-30 RX ADMIN — OXYBUTYNIN CHLORIDE 5 MG: 5 TABLET ORAL at 21:18

## 2018-09-30 RX ADMIN — SIMVASTATIN 10 MG: 10 TABLET, FILM COATED ORAL at 21:18

## 2018-09-30 RX ADMIN — POTASSIUM BICARBONATE 20 MEQ: 782 TABLET, EFFERVESCENT ORAL at 08:36

## 2018-09-30 RX ADMIN — MEROPENEM 1 G: 1 INJECTION, POWDER, FOR SOLUTION INTRAVENOUS at 01:57

## 2018-09-30 RX ADMIN — INSULIN LISPRO 4 UNITS: 100 INJECTION, SOLUTION INTRAVENOUS; SUBCUTANEOUS at 21:17

## 2018-09-30 RX ADMIN — Medication 10 ML: at 21:28

## 2018-09-30 RX ADMIN — FUROSEMIDE 20 MG: 20 TABLET ORAL at 08:36

## 2018-09-30 RX ADMIN — CIPROFLOXACIN 400 MG: 2 INJECTION, SOLUTION INTRAVENOUS at 04:39

## 2018-09-30 RX ADMIN — ENOXAPARIN SODIUM 40 MG: 40 INJECTION SUBCUTANEOUS at 12:55

## 2018-09-30 ASSESSMENT — PAIN SCALES - GENERAL
PAINLEVEL_OUTOF10: 0

## 2018-09-30 NOTE — CONSULTS
Inpatient consult to Podiatry  Consult performed by: Xander Elmore ordered by: Soren Humphrey        Podiatric Surgery Consult note  Radha Waggoner  Subjective :   Patient seen and examined. Patient has chief complaint of long painful nails that are hard to cut. The patient reports that they are unable to be trimmed by them or their family members. No other complaints at this time.       Past Medical History:    Past Medical History:   Diagnosis Date    Anemia     Arthritis     Benign prostate hyperplasia     [facility]bral [facility] occlusion with [facility] infarction ([facility]     Chronic kidney disease     COPD (chronic obstructive pulmonary disease) ([facility]     Diabetes mellitus ([facility]     II    ESBL (extended spectrum beta-lactamase) producing bacteria infection 08/12/2018    urine    Hyperlipidemia     Hypertension     Pneumonia     Schizophrenia ([facility]     Thyroid disease     hypo    Urine retention      Past Surgical History:    Past Surgical History:   Procedure Laterality Date    MI INCISE/DRAIN BLADDER  Cystoscopy, placement suprapubic catheter    Cystostomy, Suprapubic    PROSTATECTOMY      TONSILLECTOMY         Medications: Scheduled Meds:   enoxaparin  40 mg Subcutaneous Daily    sodium chloride flush  10 mL Intravenous 2 times per day    aspirin  81 mg Oral Daily    furosemide  20 mg Oral Daily    insulin glargine  25 Units Subcutaneous Nightly    levothyroxine  75 mcg Oral Daily    lisinopril  10 mg Oral Daily    oxybutynin  5 mg Oral TID    polyethylene glycol  17 g Oral Daily    potassium bicarb-citric acid  20 mEq Oral Daily    simvastatin  10 mg Oral Nightly    meropenem  1 g Intravenous Q12H    insulin lispro  0-12 Units Subcutaneous TID     insulin lispro  0-6 Units Subcutaneous Nightly     Continuous Infusions:   sodium chloride 100 mL/hr at 09/30/18 1026    dextrose       PRN Meds:.sodium chloride flush, LORazepam, glucose, dextrose, glucagon (rDNA),

## 2018-09-30 NOTE — H&P
(urinary tract infection) due to urinary indwelling catheter ([facility] [K35.402F, N39.0] 11/05/2015    DM (diabetes mellitus) type II controlled, neurological manifestation ([facility] [E11.49] 01/16/2015    HTN (hypertension) [I10] 01/14/2015     Meropenem , Cipro  And Vanc  For sepsis    management of diabetes per sliding scale  Humalog and Basal insulin    maintain IV Hydration   discussed with family    DVT prophylaxis

## 2018-10-01 VITALS
RESPIRATION RATE: 20 BRPM | OXYGEN SATURATION: 97 % | BODY MASS INDEX: 23.1 KG/M2 | TEMPERATURE: 98.5 F | HEIGHT: 74 IN | HEART RATE: 93 BPM | SYSTOLIC BLOOD PRESSURE: 151 MMHG | DIASTOLIC BLOOD PRESSURE: 63 MMHG | WEIGHT: 180 LBS

## 2018-10-01 LAB
EKG ATRIAL RATE: 86 BPM
EKG DIAGNOSIS: NORMAL
EKG P AXIS: 18 DEGREES
EKG P-R INTERVAL: 184 MS
EKG Q-T INTERVAL: 370 MS
EKG QRS DURATION: 96 MS
EKG QTC CALCULATION (BAZETT): 442 MS
EKG R AXIS: 28 DEGREES
EKG T AXIS: 40 DEGREES
EKG VENTRICULAR RATE: 86 BPM
GLUCOSE BLD-MCNC: 113 MG/DL (ref 70–99)
GLUCOSE BLD-MCNC: 189 MG/DL (ref 70–99)
PERFORMED ON: ABNORMAL
PERFORMED ON: ABNORMAL

## 2018-10-01 PROCEDURE — 93005 ELECTROCARDIOGRAM TRACING: CPT | Performed by: INTERNAL MEDICINE

## 2018-10-01 PROCEDURE — 93010 ELECTROCARDIOGRAM REPORT: CPT | Performed by: INTERNAL MEDICINE

## 2018-10-01 PROCEDURE — 2580000003 HC RX 258: Performed by: EMERGENCY MEDICINE

## 2018-10-01 PROCEDURE — G8987 SELF CARE CURRENT STATUS: HCPCS

## 2018-10-01 PROCEDURE — 6370000000 HC RX 637 (ALT 250 FOR IP): Performed by: INTERNAL MEDICINE

## 2018-10-01 PROCEDURE — 97165 OT EVAL LOW COMPLEX 30 MIN: CPT

## 2018-10-01 PROCEDURE — G8988 SELF CARE GOAL STATUS: HCPCS

## 2018-10-01 PROCEDURE — 2580000003 HC RX 258: Performed by: INTERNAL MEDICINE

## 2018-10-01 PROCEDURE — G8979 MOBILITY GOAL STATUS: HCPCS

## 2018-10-01 PROCEDURE — 97162 PT EVAL MOD COMPLEX 30 MIN: CPT

## 2018-10-01 PROCEDURE — 97530 THERAPEUTIC ACTIVITIES: CPT

## 2018-10-01 PROCEDURE — 6360000002 HC RX W HCPCS: Performed by: INTERNAL MEDICINE

## 2018-10-01 PROCEDURE — G8978 MOBILITY CURRENT STATUS: HCPCS

## 2018-10-01 RX ORDER — FLUCONAZOLE 100 MG/1
200 TABLET ORAL DAILY
Status: DISCONTINUED | OUTPATIENT
Start: 2018-10-01 | End: 2018-10-01 | Stop reason: HOSPADM

## 2018-10-01 RX ORDER — FLUCONAZOLE 200 MG/1
200 TABLET ORAL DAILY
Qty: 5 TABLET | Refills: 0 | Status: SHIPPED | OUTPATIENT
Start: 2018-10-01 | End: 2018-10-06

## 2018-10-01 RX ADMIN — FLUCONAZOLE 200 MG: 100 TABLET ORAL at 11:18

## 2018-10-01 RX ADMIN — POTASSIUM BICARBONATE 20 MEQ: 782 TABLET, EFFERVESCENT ORAL at 10:35

## 2018-10-01 RX ADMIN — ASPIRIN 81 MG: 81 TABLET, CHEWABLE ORAL at 10:35

## 2018-10-01 RX ADMIN — INSULIN LISPRO 2 UNITS: 100 INJECTION, SOLUTION INTRAVENOUS; SUBCUTANEOUS at 13:10

## 2018-10-01 RX ADMIN — LISINOPRIL 10 MG: 10 TABLET ORAL at 10:35

## 2018-10-01 RX ADMIN — LEVOTHYROXINE SODIUM 75 MCG: 75 TABLET ORAL at 06:16

## 2018-10-01 RX ADMIN — OXYBUTYNIN CHLORIDE 5 MG: 5 TABLET ORAL at 10:35

## 2018-10-01 RX ADMIN — FUROSEMIDE 20 MG: 20 TABLET ORAL at 10:36

## 2018-10-01 RX ADMIN — ENOXAPARIN SODIUM 40 MG: 40 INJECTION SUBCUTANEOUS at 10:35

## 2018-10-01 RX ADMIN — MEROPENEM 1 G: 1 INJECTION, POWDER, FOR SOLUTION INTRAVENOUS at 03:02

## 2018-10-01 RX ADMIN — MEROPENEM 1 G: 1 INJECTION, POWDER, FOR SOLUTION INTRAVENOUS at 14:45

## 2018-10-01 RX ADMIN — SODIUM CHLORIDE: 9 INJECTION, SOLUTION INTRAVENOUS at 00:42

## 2018-10-01 RX ADMIN — Medication 10 ML: at 11:18

## 2018-10-01 RX ADMIN — OXYBUTYNIN CHLORIDE 5 MG: 5 TABLET ORAL at 15:36

## 2018-10-01 NOTE — PROGRESS NOTES
emergency department for evaluation for altered mental status. Patient is from [address] [place] [address]. Per nursing home report, patient was \"unresponsive\" patient was recently admitted from pneumonia. Vision/Hearing  Vision: Impaired  Vision Exceptions: Wears glasses at all times  Hearing: Exceptions to [facility]  Hearing Exceptions: Hard of hearing/hearing concerns;Bilateral hearing aid     Subjective  General  Chart Reviewed: Yes  Patient assessed for rehabilitation services?: Yes  Additional Pertinent Hx: HTN,DM, stroke, COPD, foot infection  Response To Previous Treatment: Patient with no complaints from previous session. Family / Caregiver Present: No  Diagnosis: Sepsis/ UTI  Follows Commands: Within Functional Limits  General Comment  Comments: Pt seated in chair upon arrival  Subjective  Subjective: Pt agreeable to therapy this morning;  Pt stated he is from [facility], but is expecting to go home   Pain Screening  Patient Currently in Pain: Denies  Vital Signs  Patient Currently in Pain: Denies       Orientation  Orientation  Overall Orientation Status: Within Functional Limits  Orientation Level: Oriented to person (Pt became frustrated and agitated with questioning)    Social/Functional History  Social/Functional History  Lives With: Spouse  Type of Home: Apartment  Home Layout: One level  Home Access: Stairs to enter without rails  Entrance Stairs - Number of Steps: 7 DANIEL  Bathroom Shower/Tub: Tub/Shower unit, Shower chair without back  Bathroom Toilet: Standard  Bathroom Equipment: Grab bars in shower  Bathroom Accessibility: Walker accessible  Home Equipment: [address], 4 wheeled walker  ADL Assistance: [address]: Needs assistance (Shared responsibilities with cooking with \"helper\"; helper does laundry and groceries)  Homemaking Responsibilities: No  Ambulation Assistance: Independent  Transfer Assistance: Needs assistance (Needs help with getting into shower)  Active :

## 2018-10-01 NOTE — PROGRESS NOTES
rehabilitation services?: Yes  Subjective  Subjective: pt up in recliner upon arrival, agreeable to OT/PT eval  Pain Assessment  Patient Currently in Pain: Denies  Pain Intervention(s): Repositioned, Elevation  Oxygen Therapy  SpO2: 95 %  O2 Device: None (Room air)  Social/Functional History  Social/Functional History  Lives With: Spouse  Type of Home: Apartment  Home Layout: One level  Home Access: Stairs to enter without rails  Entrance Stairs - Number of Steps: 7 DANIEL  Bathroom Shower/Tub: Tub/Shower unit, Shower chair without back  Bathroom Toilet: Standard  Bathroom Equipment: Grab bars in shower  Bathroom Accessibility: Walker accessible  Home Equipment: [employer], 4 wheeled walker  ADL Assistance: [address]: Needs assistance (Shared responsibilities with cooking with \"helper\"; helper does laundry and groceries)  Homemaking Responsibilities: No  Ambulation Assistance: Independent  Transfer Assistance: Needs assistance (Needs help with getting into shower)  Active : No  Additional Comments: Pt denies any falls in past 6 months       Objective        Orientation  Overall Orientation Status: Impaired  Orientation Level: Oriented to person;Disoriented to place; Disoriented to time;Disoriented to situation  Observation/Palpation  Posture: Fair  Observation: lateral lean to the right, protracted shoulders  Balance  Sitting Balance: Stand by assistance  Standing Balance: Moderate assistance  Standing Balance  Time: ~1 min x2  Activity: adonis care and donning bried  Sit to stand: Moderate assistance  Stand to sit: Moderate assistance  Functional Mobility  Functional - Mobility Device: Rolling Walker  Activity: Other  Assist Level: Moderate assistance  Functional Mobility Comments: Functional mobility with RW in room, 15 ft. Pt reporting he needed to use the Restroom. Bedside commode used for BM. Pt straining to produce BM.  Pt able to perform stand x2 for adonis care and donning brief before returning to recliner. Pt became unresponsive upon sitting. SPO2, pulse taken, feet elevated and RN notified (see vitals)  Toilet Transfers  Toilet - Technique: Ambulating  Equipment Used: Standard bedside commode  Toilet Transfer: Moderate assistance  Toilet Transfers Comments: Pt strained to produce BM, able to perform stand x2 for adonis care. Pt performed functional mobility back to recliner where he was able to sit. Pt became unresponsive to cues (see vitals). Pt was reclined and LEs elevated. pt able to respond to cues after 2 mins. RN notified  ADL  Grooming: Setup;Verbal cueing;Stand by assistance (face washing)  UE Dressing: Dependent/Total;Verbal cueing; Increased time to complete (donning gown)  LE Dressing: Dependent/Total (donning brief)  Toileting: Dependent/Total (adonis care after BM)  Additional Comments: RN notified of BM  Tone RUE  RUE Tone: Normotonic  Tone LUE  LUE Tone: Normotonic  Coordination  Movements Are Fluid And Coordinated: No  Coordination and Movement description: Decreased speed;Decreased accuracy     Bed mobility  Comment: unable to assess this date, pt seated in recliner upon arrival and at end of session  Transfers  Sit to stand: Moderate assistance  Stand to sit: Moderate assistance  Transfer Comments: Recliner>BSC>recliner     Cognition  Overall Cognitive Status: Exceptions  Arousal/Alertness: Delayed responses to stimuli;Inconsistent responses to stimuli  Following Commands: Follows one step commands consistently  Attention Span: Attends with cues to redirect; Difficulty attending to directions; Difficulty dividing attention  Memory: Decreased recall of biographical Information;Decreased recall of precautions;Decreased recall of recent events;Decreased short term memory;Decreased long term memory  Safety Judgement: Decreased awareness of need for assistance;Decreased awareness of need for safety  Problem Solving: Assistance required to generate solutions;Assistance required to correct errors made;Assistance required to identify errors made;Assistance required to implement solutions;Decreased awareness of errors  Insights: Decreased awareness of deficits  Initiation: Requires cues for all  Sequencing: Requires cues for all  Perception  Overall Perceptual Status: WFL     Sensation  Overall Sensation Status: WFL      Assessment   Performance deficits / Impairments: Decreased functional mobility ; Decreased ADL status  Assessment: pt presents with decreased functional mobility and ADL status  Treatment Diagnosis: AMS  History: pt admitted for sepsis and PNA 9/24. Came from [facility]. Pt reports living at home with his wife  Patient Education: OT eval, POC, discharge  Barriers to Learning: cognition, hearing  REQUIRES OT FOLLOW UP: Yes  Activity Tolerance  Activity Tolerance: Treatment limited secondary to agitation;Patient Tolerated treatment well  Safety Devices  Safety Devices in place: Yes  Type of devices: All fall risk precautions in place; Left in chair;Nurse notified;Call light within reach; Chair alarm in place;Gait belt;Patient at risk for falls         Plan   Plan  Times per week: 2-5  Times per day: Daily  Current Treatment Recommendations: Functional Mobility Training, Self-Care / ADL, Patient/Caregiver Education & Training    G-Code  OT G-codes  Functional Assessment Tool Used: AM PAC ADL  Score: 8  Functional Limitation: Self care  Self Care Current Status (): At least 80 percent but less than 100 percent impaired, limited or restricted  Self Care Goal Status ():  At least 60 percent but less than 80 percent impaired, limited or restricted  AM-PAC Score        AM-[facility] Inpatient Daily Activity Raw Score: 8  AM-PAC Inpatient ADL T-Scale Score : 22.86  ADL Inpatient CMS 0-100% Score: 85.69  ADL Inpatient CMS G-Code Modifier : CM    Goals  Short term goals  Time Frame for Short term goals: STG=LTG  Short term goal 1: grooming ADL supervision  Short term goal 2: UB ADLs

## 2018-10-01 NOTE — PROGRESS NOTES
Intravenous, Q12H  LORazepam (ATIVAN) tablet 0.5 mg, 0.5 mg, Oral, Nightly PRN  glucose (GLUTOSE) 40 % oral gel 15 g, 15 g, Oral, PRN  dextrose 50 % solution 12.5 g, 12.5 g, Intravenous, PRN  glucagon (rDNA) injection 1 mg, 1 mg, Intramuscular, PRN  dextrose 5 % solution, 100 mL/hr, Intravenous, PRN  insulin lispro (HUMALOG) injection pen 0-12 Units, 0-12 Units, Subcutaneous, TID WC  insulin lispro (HUMALOG) injection pen 0-6 Units, 0-6 Units, Subcutaneous, Nightly    Physical      VITALS:  BP (!) 148/72   Pulse 82   Temp 98.2 °F (36.8 °C) (Oral)   Resp 18   Ht 6' 2\" (1.88 m)   Wt 180 lb (81.6 kg)   SpO2 96%   BMI 23.11 kg/m²   TEMPERATURE:  Current - Temp: 98.2 °F (36.8 °C);  Max - Temp  Av.9 °F (36.6 °C)  Min: 97.2 °F (36.2 °C)  Max: 98.3 °F (36.8 °C)  RESPIRATIONS RANGE: Resp  Av.3  Min: 16  Max: 24  PULSE RANGE: Pulse  Av.8  Min: 80  Max: 94  BLOOD PRESSURE RANGE:  Systolic (29NII), LMU:923 , Min:114 , PFB:026   ; Diastolic (36KVN), PIK:62, Min:60, Max:84    PULSE OXIMETRY RANGE: SpO2  Av.7 %  Min: 93 %  Max: 96 %  24HR INTAKE/OUTPUT:    Intake/Output Summary (Last 24 hours) at 10/01/18 0803  Last data filed at 10/01/18 0539   Gross per 24 hour   Intake                0 ml   Output             3075 ml   Net            -3075 ml     CONSTITUTIONAL:  fatigued, alert, cooperative and mild distress  EYES:  Unremarkable   NECK:  Mild JVD  and supple, symmetrical, trachea midline  BACK:  Mild Kyphotic  and symmetric  LUNGS:  no increased work of breathing, good air exchange, no retractions and no crackles or wheezing  CARDIOVASCULAR:  normal apical pulses, regular rate and rhythm, normal S1 and S2 and no S3  ABDOMEN:  Soft BS + non tender   MUSCULOSKELETAL:  Trace edema   there is no redness, warmth, or swelling of the joints  NEUROLOGIC:  General weakness , poor co-ordination   SKIN:  Warm and dry  and no bruising or bleeding    Data      No results found for: PHART, PO2ART, NKL6KMU, [id number],

## 2018-10-01 NOTE — DISCHARGE SUMMARY
[facility] -- Physician Discharge Summary     Heather Sic  11/9/1924  MRN: [id number]    Admit Date: 9/28/2018  Discharge Date: No discharge date for patient encounter. Attending MD: Obi Buck MD  Discharging MD: Obi Buck MD  PCP: Dacia Aj MD [address] [place] [address] [phone]    Admission Diagnosis: Sepsis ([address] [A41.9]  Sepsis ([address] [A41.9]  DISCHARGE DIAGNOSIS: UTI with sepsis    Full Hospital Problem List:  PARKER/Dorina Tan 1106 Problems    Diagnosis Date Noted    Foot infection [L08.9] 09/29/2018    Moderate malnutrition ([address] [E44.0] 09/21/2018    Acute cystitis without hematuria [N30.00]     Dehydration [E86.0]     COPD (chronic obstructive pulmonary disease) ([address] [J44.9] 04/10/2017    History of stroke [Z86.73] 02/10/2017    Diabetes mellitus with peripheral circulatory disorder ([address] [E11.51] 02/10/2017    Sepsis ([address] [A41.9] 09/26/2016    UTI (urinary tract infection) due to urinary indwelling catheter ([facility] [W78.831F, N39.0] 11/05/2015    DM (diabetes mellitus) type II controlled, neurological manifestation ([address] [E11.49] 01/16/2015    HTN (hypertension) [I10] 01/14/2015           Hospital Course:  80 y.o. male who presents to the emergency department for evaluation for altered mental status. Patient is from [address] [place] [address]. Per nursing home report, patient was \"unresponsive\" patient was recently admitted from pneumonia.     When patient arrives to the ED he is alert, he has no complaints. He denies any chest pain or shortness of breath.     He is found to have UTI  Urine cx is positive for fungus  Treated with Diflucan    UTI is felt to be related to suprapubic catheter     Sepsis resolved  WBC is markedly better - 21K on admit; now 11K  Pt's mentation is better    Pt did have one episode of vasovagal syncope while here post BM    Consults made during Hospitalization:  IP CONSULT TO PRIMARY CARE PROVIDER  IP CONSULT TO SOCIAL

## 2018-10-02 NOTE — PROGRESS NOTES
Occupational Therapy Discharge Summary    Name: Diana Fernandez  : 1924    The pt was evaluated by OT on 10/1/18 and seen for initial evaluation only, prior to DC to SNF on 10/1/18, per MD order. The pt's acute therapy goals were:    Short term goals  Time Frame for Short term goals: STG=LTG  Short term goal 1: grooming ADL supervision  Short term goal 2: UB ADLs supervision  Short term goal 3: functional mobility with RW CGA  Short term goal 4: functional ADL transfer with RW CGA  Short term goal 5: bed mobility SBA        Patient met 0 goals during stay. Number of Refusals:0  Number of Holds: 0    During this hospitalization, the patient was educated on:  Patient Education: OT eval, POC, discharge    DC pt from OT caseload at this time.   Thank you, Jovanny Petit, OTR/L, [id number]

## 2018-10-03 NOTE — ED PROVIDER NOTES
infarction [facility])     Chronic kidney disease     COPD (chronic obstructive pulmonary disease) (HCC)     Diabetes mellitus (HCC)     II    ESBL (extended spectrum beta-lactamase) producing bacteria infection 08/12/2018    urine    Hyperlipidemia     Hypertension     Schizophrenia ([facility]     Thyroid disease     hypo    Urine retention        SURGICAL HISTORY           Procedure Laterality Date    KS INCISE/DRAIN BLADDER  Cystoscopy, placement suprapubic catheter    Cystostomy, Suprapubic    PROSTATECTOMY      TONSILLECTOMY         CURRENT MEDICATIONS       Previous Medications    ASPIRIN 81 MG CHEWABLE TABLET    Take 1 tablet by mouth daily. FUROSEMIDE (LASIX) 20 MG TABLET    Take 20 mg by mouth daily    INSULIN GLARGINE (LANTUS) 100 UNIT/ML INJECTION PEN    Inject 25 Units into the skin nightly    INSULIN LISPRO (HUMALOG KWIKPEN) 100 UNIT/ML PEN    **Medium Dose Corrective Algorithm**  Glucose: Dose:  If <139             No Insulin  140-199 2 Units  200-249 4 Units  250-299 6 Units  300-349 8 Units  350-400 10 Units  Above 400       12 Units    LEVOTHYROXINE (SYNTHROID) 75 MCG TABLET    Take 75 mcg by mouth Daily. LINAGLIPTIN (TRADJENTA) 5 MG TABLET    Take 1 tablet by mouth daily    LISINOPRIL (PRINIVIL;ZESTRIL) 10 MG TABLET    Take 1 tablet by mouth daily    OXYBUTYNIN (DITROPAN) 5 MG TABLET    Take 5 mg by mouth 3 times daily    POLYETHYLENE GLYCOL (GLYCOLAX) POWDER    Take 17 g by mouth daily    POTASSIUM CHLORIDE 20 MEQ/15ML (10%) ORAL SOLUTION    Take 20 mEq by mouth daily    SIMVASTATIN (ZOCOR) 10 MG TABLET    Take 10 mg by mouth nightly    TEMAZEPAM (RESTORIL) 15 MG CAPSULE    Take 2 capsules by mouth nightly as needed for Sleep       ALLERGIES     Pcn [penicillins]; Sulfa antibiotics; and Ceftriaxone    FAMILY HISTORY     History reviewed. No pertinent family history. No family status information on file. SOCIAL HISTORY      reports that he has quit smoking.  His smoking use included Cigarettes. He does not have any smokeless tobacco history on file. He reports that he does not drink alcohol. PHYSICAL EXAM    (up to 7 for level 4, 8 or more for level 5)     ED Triage Vitals   BP Temp Temp Source Pulse Resp SpO2 Height Weight   18 1037 18 1037 18 1037 18 1037 18 1037 18 1101 -- 18 1037   115/61 102.1 °F (38.9 °C) Rectal 98 18 95 %  172 lb 2.9 oz (78.1 kg)       Physical Exam   Constitutional: He is oriented to person, place, and time. Elderly ill-appearing but nontoxic male in no distress   HENT:   Head: Normocephalic and atraumatic. Mouth/Throat: Oropharynx is clear and moist.   Neck: Neck supple. Cardiovascular: Normal rate, regular rhythm and normal heart sounds. Pulmonary/Chest: Effort normal and breath sounds normal. No respiratory distress. Abdominal: Soft. Bowel sounds are normal. He exhibits no distension. There is no tenderness. Suprapubic catheter in place with thick crusting to tube and surrounding abdomen. Skin is raw and excoriated. No erythema or warmth. Musculoskeletal: Normal range of motion. He exhibits no edema. Neurological: He is alert and oriented to person, place, and time. Drowsy but rousable, conversational, alert and oriented to self and situation but appears altered   Skin: Skin is warm and dry. Psychiatric: He has a normal mood and affect. His behavior is normal.   Nursing note and vitals reviewed. DIAGNOSTIC RESULTS     EK-lead EKG interpreted as normal sinus rhythm with a rate of 99 bpm. No ST elevation or depression on my review. Please see Dr. Justyna Cruz note for full interpretation.      RADIOLOGY:   Non-plain film images such as CT, Ultrasound and MRI are read by the radiologist. Plain radiographic images are visualized and preliminarily interpreted by Kulwinder Alexander PA-C with the below findings:        Interpretation per the Radiologist below, if available at the time of this note:    CT Head WO Contrast   Final Result   No acute intracranial abnormality. XR CHEST PORTABLE   Final Result   Increased bibasilar airspace opacities which may be infectious. Consider   follow-up imaging to resolution.              LABS:  Labs Reviewed   URINE RT REFLEX TO CULTURE - Abnormal; Notable for the following:        Result Value    Clarity, UA CLOUDY (*)     Blood, Urine LARGE (*)     Protein, UA 30 (*)     Nitrite, Urine POSITIVE (*)     Leukocyte Esterase, Urine LARGE (*)     All other components within normal limits    Narrative:     Performed at:  [address] [facility]   Phone [phone]   CBC WITH AUTO DIFFERENTIAL - Abnormal; Notable for the following:     WBC 17.4 (*)     RBC 3.81 (*)     Hemoglobin 11.4 (*)     Hematocrit 35.0 (*)     Neutrophils # 15.5 (*)     All other components within normal limits    Narrative:     Performed at:  [facility]   Phone [phone]   COMPREHENSIVE METABOLIC PANEL - Abnormal; Notable for the following:     Sodium 133 (*)     Chloride 95 (*)     Glucose 268 (*)     BUN 32 (*)     Total Protein 6.3 (*)     Alb 2.5 (*)     Albumin/Globulin Ratio 0.7 (*)     Alkaline Phosphatase 150 (*)     ALT 53 (*)     AST 62 (*)     All other components within normal limits    Narrative:     Performed at:  [facility]   Phone [phone]   BLOOD GAS, VENOUS - Abnormal; Notable for the following:     HCO3, Venous 31 (*)     All other components within normal limits    Narrative:     Performed at:  [address] [facility]   Phone [phone]   MICROSCOPIC URINALYSIS - Abnormal; Notable for the following:     Bacteria, UA 4+ (*)     Hyaline Casts, UA 9 (*)     WBC,  (*) treated for ESBL at most recent visit leading to this decline. He was given Merrem for ESBL UTI. He is found to be dehydrated with BUN of 32 and creatinine of 0.8. Glucose is 268. No evidence of DKA. Lactic acid reassuringly normal at 1.1. He does not have severe sepsis. A CT head was obtained due to recent fall and head trauma, this was unremarkable. Chest x-ray shows possible pneumonia. He was covered with Zithromax in addition to [address] [place] [address] Street. We have recommended admission to the hospital for further evaluation and treatment and Dr. Odalys De La Rosa has agreed to admit for further care. Patient and daughter comfortable with plan for admission. PROCEDURES:  Insert Suprapubic Catheter  Date/Time: 9/19/2018 12:33 PM  Performed by: Jerome Mcdaniels  Authorized by: Karl Hobbs   Consent: Verbal consent obtained. Risks and benefits: risks, benefits and alternatives were discussed  Consent given by: patient  Patient understanding: patient states understanding of the procedure being performed  Patient identity confirmed: verbally with patient  Indications: catheter change  Local anesthesia used: no    Anesthesia:  Local anesthesia used: no    Sedation:  Patient sedated: no  Preparation: Patient was prepped and draped in the usual sterile fashion. Suprapubic aspiration by: catheter  Catheter type: Aj  Catheter size: 24 Fr  Number of attempts: 1  Urine characteristics: mildly cloudy and yellow  Patient tolerance: Patient tolerated the procedure well with no immediate complications  Comments: Inflated with 30 cc sterile water          FINAL IMPRESSION      1. Complicated UTI (urinary tract infection)    2. Pneumonia due to organism    3. Fever, unspecified fever cause    4. Delirium    5.  Dehydration          DISPOSITION/PLAN   DISPOSITION Admitted 09/19/2018 12:19:42 PM      PATIENT REFERRED TO:  Nadia Mata MD  [address] [place] [address]  [phone]            DISCHARGE MEDICATIONS:  New No

## 2018-10-04 LAB
BLOOD CULTURE, ROUTINE: NORMAL
CULTURE, BLOOD 2: NORMAL

## 2018-12-16 ENCOUNTER — HOSPITAL ENCOUNTER (INPATIENT)
Age: 83
LOS: 7 days | Discharge: NURSING, REHAB OR LONG-TERM CARE | DRG: 698 | End: 2018-12-23
Attending: EMERGENCY MEDICINE | Admitting: HOSPITALIST
Payer: MEDICARE

## 2018-12-16 DIAGNOSIS — E86.0 DEHYDRATION: Primary | ICD-10-CM

## 2018-12-16 PROBLEM — N39.0 UTI (URINARY TRACT INFECTION): Status: ACTIVE | Noted: 2018-12-16

## 2018-12-16 LAB
A/G RATIO: 0.8 (ref 1.1–2.2)
ALBUMIN SERPL-MCNC: 3 G/DL (ref 3.4–5)
ALP BLD-CCNC: 160 U/L (ref 40–129)
ALT SERPL-CCNC: 51 U/L (ref 10–40)
AMORPHOUS: ABNORMAL /HPF
ANION GAP SERPL CALCULATED.3IONS-SCNC: 9 MMOL/L (ref 3–16)
ANISOCYTOSIS: ABNORMAL
AST SERPL-CCNC: 52 U/L (ref 15–37)
BACTERIA: ABNORMAL /HPF
BASOPHILS ABSOLUTE: 0 K/UL (ref 0–0.2)
BASOPHILS RELATIVE PERCENT: 0 %
BILIRUB SERPL-MCNC: 1.4 MG/DL (ref 0–1)
BILIRUBIN URINE: NEGATIVE
BLOOD, URINE: ABNORMAL
BUN BLDV-MCNC: 51 MG/DL (ref 7–20)
CALCIUM SERPL-MCNC: 9.7 MG/DL (ref 8.3–10.6)
CHLORIDE BLD-SCNC: 101 MMOL/L (ref 99–110)
CLARITY: ABNORMAL
CO2: 29 MMOL/L (ref 21–32)
COLOR: YELLOW
CREAT SERPL-MCNC: 1.8 MG/DL (ref 0.8–1.3)
CRYSTALS, UA: ABNORMAL /HPF
EOSINOPHILS ABSOLUTE: 0 K/UL (ref 0–0.6)
EOSINOPHILS RELATIVE PERCENT: 0 %
GFR AFRICAN AMERICAN: 43
GFR NON-AFRICAN AMERICAN: 35
GLOBULIN: 3.7 G/DL
GLUCOSE BLD-MCNC: 104 MG/DL (ref 70–99)
GLUCOSE BLD-MCNC: 117 MG/DL (ref 70–99)
GLUCOSE URINE: NEGATIVE MG/DL
HCT VFR BLD CALC: 32.9 % (ref 40.5–52.5)
HEMOGLOBIN: 10.8 G/DL (ref 13.5–17.5)
KETONES, URINE: NEGATIVE MG/DL
LEUKOCYTE ESTERASE, URINE: ABNORMAL
LYMPHOCYTES ABSOLUTE: 0.9 K/UL (ref 1–5.1)
LYMPHOCYTES RELATIVE PERCENT: 6 %
MCH RBC QN AUTO: 31 PG (ref 26–34)
MCHC RBC AUTO-ENTMCNC: 32.8 G/DL (ref 31–36)
MCV RBC AUTO: 94.5 FL (ref 80–100)
MICROSCOPIC EXAMINATION: YES
MONOCYTES ABSOLUTE: 0.8 K/UL (ref 0–1.3)
MONOCYTES RELATIVE PERCENT: 5 %
MUCUS: ABNORMAL /LPF
NEUTROPHILS ABSOLUTE: 13.8 K/UL (ref 1.7–7.7)
NEUTROPHILS RELATIVE PERCENT: 89 %
NITRITE, URINE: NEGATIVE
OVALOCYTES: ABNORMAL
PDW BLD-RTO: 16.5 % (ref 12.4–15.4)
PERFORMED ON: ABNORMAL
PH UA: 8.5
PLATELET # BLD: 199 K/UL (ref 135–450)
PLATELET SLIDE REVIEW: ADEQUATE
PMV BLD AUTO: 8.2 FL (ref 5–10.5)
POTASSIUM SERPL-SCNC: 4.2 MMOL/L (ref 3.5–5.1)
PROTEIN UA: 100 MG/DL
RBC # BLD: 3.48 M/UL (ref 4.2–5.9)
RBC UA: ABNORMAL /HPF (ref 0–2)
SLIDE REVIEW: ABNORMAL
SODIUM BLD-SCNC: 139 MMOL/L (ref 136–145)
SPECIFIC GRAVITY UA: 1.01
TOTAL PROTEIN: 6.7 G/DL (ref 6.4–8.2)
URINE REFLEX TO CULTURE: YES
URINE TYPE: ABNORMAL
UROBILINOGEN, URINE: 0.2 E.U./DL
WBC # BLD: 15.5 K/UL (ref 4–11)
WBC UA: ABNORMAL /HPF (ref 0–5)

## 2018-12-16 PROCEDURE — 81001 URINALYSIS AUTO W/SCOPE: CPT

## 2018-12-16 PROCEDURE — 87077 CULTURE AEROBIC IDENTIFY: CPT

## 2018-12-16 PROCEDURE — 87040 BLOOD CULTURE FOR BACTERIA: CPT

## 2018-12-16 PROCEDURE — 1200000000 HC SEMI PRIVATE

## 2018-12-16 PROCEDURE — 36415 COLL VENOUS BLD VENIPUNCTURE: CPT

## 2018-12-16 PROCEDURE — 87801 DETECT AGNT MULT DNA AMPLI: CPT

## 2018-12-16 PROCEDURE — 87186 SC STD MICRODIL/AGAR DIL: CPT

## 2018-12-16 PROCEDURE — 99285 EMERGENCY DEPT VISIT HI MDM: CPT

## 2018-12-16 PROCEDURE — 2500000003 HC RX 250 WO HCPCS: Performed by: EMERGENCY MEDICINE

## 2018-12-16 PROCEDURE — 80053 COMPREHEN METABOLIC PANEL: CPT

## 2018-12-16 PROCEDURE — 85025 COMPLETE CBC W/AUTO DIFF WBC: CPT

## 2018-12-16 PROCEDURE — 96361 HYDRATE IV INFUSION ADD-ON: CPT

## 2018-12-16 PROCEDURE — 87086 URINE CULTURE/COLONY COUNT: CPT

## 2018-12-16 PROCEDURE — 96365 THER/PROPH/DIAG IV INF INIT: CPT

## 2018-12-16 PROCEDURE — 2580000003 HC RX 258: Performed by: EMERGENCY MEDICINE

## 2018-12-16 RX ORDER — LANOLIN ALCOHOL/MO/W.PET/CERES
1000 CREAM (GRAM) TOPICAL 2 TIMES DAILY
COMMUNITY

## 2018-12-16 RX ORDER — 0.9 % SODIUM CHLORIDE 0.9 %
1000 INTRAVENOUS SOLUTION INTRAVENOUS ONCE
Status: COMPLETED | OUTPATIENT
Start: 2018-12-16 | End: 2018-12-16

## 2018-12-16 RX ORDER — CLINDAMYCIN PHOSPHATE 600 MG/50ML
600 INJECTION INTRAVENOUS ONCE
Status: COMPLETED | OUTPATIENT
Start: 2018-12-16 | End: 2018-12-16

## 2018-12-16 RX ORDER — RISPERIDONE 0.25 MG/1
0.25 TABLET, FILM COATED ORAL 2 TIMES DAILY
COMMUNITY

## 2018-12-16 RX ADMIN — CLINDAMYCIN IN 5 PERCENT DEXTROSE 600 MG: 12 INJECTION, SOLUTION INTRAVENOUS at 23:16

## 2018-12-16 RX ADMIN — SODIUM CHLORIDE 1000 ML: 9 INJECTION, SOLUTION INTRAVENOUS at 22:31

## 2018-12-17 LAB
ALBUMIN SERPL-MCNC: 2.3 G/DL (ref 3.4–5)
ALP BLD-CCNC: 144 U/L (ref 40–129)
ALT SERPL-CCNC: 40 U/L (ref 10–40)
ANION GAP SERPL CALCULATED.3IONS-SCNC: 11 MMOL/L (ref 3–16)
ANISOCYTOSIS: ABNORMAL
AST SERPL-CCNC: 38 U/L (ref 15–37)
BASOPHILS ABSOLUTE: 0 K/UL (ref 0–0.2)
BASOPHILS RELATIVE PERCENT: 0 %
BILIRUB SERPL-MCNC: 1.4 MG/DL (ref 0–1)
BILIRUBIN DIRECT: 0.4 MG/DL (ref 0–0.3)
BILIRUBIN, INDIRECT: 1 MG/DL (ref 0–1)
BUN BLDV-MCNC: 56 MG/DL (ref 7–20)
CALCIUM SERPL-MCNC: 9.2 MG/DL (ref 8.3–10.6)
CHLORIDE BLD-SCNC: 107 MMOL/L (ref 99–110)
CO2: 24 MMOL/L (ref 21–32)
CREAT SERPL-MCNC: 1.6 MG/DL (ref 0.8–1.3)
EOSINOPHILS ABSOLUTE: 0 K/UL (ref 0–0.6)
EOSINOPHILS RELATIVE PERCENT: 0 %
GFR AFRICAN AMERICAN: 49
GFR NON-AFRICAN AMERICAN: 40
GLUCOSE BLD-MCNC: 133 MG/DL (ref 70–99)
GLUCOSE BLD-MCNC: 153 MG/DL (ref 70–99)
GLUCOSE BLD-MCNC: 158 MG/DL (ref 70–99)
GLUCOSE BLD-MCNC: 247 MG/DL (ref 70–99)
GLUCOSE BLD-MCNC: 249 MG/DL (ref 70–99)
GLUCOSE BLD-MCNC: 409 MG/DL (ref 70–99)
HCT VFR BLD CALC: 32.7 % (ref 40.5–52.5)
HEMOGLOBIN: 10.5 G/DL (ref 13.5–17.5)
LYMPHOCYTES ABSOLUTE: 0.3 K/UL (ref 1–5.1)
LYMPHOCYTES RELATIVE PERCENT: 2 %
MCH RBC QN AUTO: 30.9 PG (ref 26–34)
MCHC RBC AUTO-ENTMCNC: 32.2 G/DL (ref 31–36)
MCV RBC AUTO: 96.2 FL (ref 80–100)
MONOCYTES ABSOLUTE: 1.3 K/UL (ref 0–1.3)
MONOCYTES RELATIVE PERCENT: 9 %
NEUTROPHILS ABSOLUTE: 13 K/UL (ref 1.7–7.7)
NEUTROPHILS RELATIVE PERCENT: 89 %
NUCLEATED RED BLOOD CELLS: 1 /100 WBC
PDW BLD-RTO: 16.4 % (ref 12.4–15.4)
PERFORMED ON: ABNORMAL
PLATELET # BLD: 170 K/UL (ref 135–450)
PLATELET SLIDE REVIEW: ADEQUATE
PMV BLD AUTO: 8.2 FL (ref 5–10.5)
POIKILOCYTES: ABNORMAL
POLYCHROMASIA: ABNORMAL
POTASSIUM REFLEX MAGNESIUM: 4.1 MMOL/L (ref 3.5–5.1)
RBC # BLD: 3.4 M/UL (ref 4.2–5.9)
REPORT: NORMAL
SLIDE REVIEW: ABNORMAL
SODIUM BLD-SCNC: 142 MMOL/L (ref 136–145)
TOTAL PROTEIN: 5.8 G/DL (ref 6.4–8.2)
WBC # BLD: 14.6 K/UL (ref 4–11)

## 2018-12-17 PROCEDURE — 80076 HEPATIC FUNCTION PANEL: CPT

## 2018-12-17 PROCEDURE — 36415 COLL VENOUS BLD VENIPUNCTURE: CPT

## 2018-12-17 PROCEDURE — 6370000000 HC RX 637 (ALT 250 FOR IP): Performed by: HOSPITALIST

## 2018-12-17 PROCEDURE — 6360000002 HC RX W HCPCS: Performed by: HOSPITALIST

## 2018-12-17 PROCEDURE — 6370000000 HC RX 637 (ALT 250 FOR IP): Performed by: INTERNAL MEDICINE

## 2018-12-17 PROCEDURE — 85025 COMPLETE CBC W/AUTO DIFF WBC: CPT

## 2018-12-17 PROCEDURE — 2580000003 HC RX 258: Performed by: HOSPITALIST

## 2018-12-17 PROCEDURE — 2580000003 HC RX 258: Performed by: INTERNAL MEDICINE

## 2018-12-17 PROCEDURE — 87040 BLOOD CULTURE FOR BACTERIA: CPT

## 2018-12-17 PROCEDURE — 6360000002 HC RX W HCPCS: Performed by: INTERNAL MEDICINE

## 2018-12-17 PROCEDURE — 80048 BASIC METABOLIC PNL TOTAL CA: CPT

## 2018-12-17 PROCEDURE — 1200000000 HC SEMI PRIVATE

## 2018-12-17 RX ORDER — DEXTROSE MONOHYDRATE 50 MG/ML
100 INJECTION, SOLUTION INTRAVENOUS PRN
Status: DISCONTINUED | OUTPATIENT
Start: 2018-12-17 | End: 2018-12-23 | Stop reason: HOSPADM

## 2018-12-17 RX ORDER — INSULIN GLARGINE 100 [IU]/ML
22 INJECTION, SOLUTION SUBCUTANEOUS NIGHTLY
Status: DISCONTINUED | OUTPATIENT
Start: 2018-12-17 | End: 2018-12-23 | Stop reason: HOSPADM

## 2018-12-17 RX ORDER — SODIUM CHLORIDE 0.9 % (FLUSH) 0.9 %
10 SYRINGE (ML) INJECTION EVERY 12 HOURS SCHEDULED
Status: DISCONTINUED | OUTPATIENT
Start: 2018-12-17 | End: 2018-12-23 | Stop reason: HOSPADM

## 2018-12-17 RX ORDER — HYDROCODONE BITARTRATE AND ACETAMINOPHEN 5; 325 MG/1; MG/1
1 TABLET ORAL EVERY 4 HOURS PRN
COMMUNITY

## 2018-12-17 RX ORDER — DOXYCYCLINE HYCLATE 100 MG
100 TABLET ORAL EVERY 12 HOURS SCHEDULED
Status: DISCONTINUED | OUTPATIENT
Start: 2018-12-17 | End: 2018-12-17

## 2018-12-17 RX ORDER — CASTOR OIL AND BALSAM, PERU 788; 87 MG/G; MG/G
OINTMENT TOPICAL 2 TIMES DAILY
Status: DISCONTINUED | OUTPATIENT
Start: 2018-12-17 | End: 2018-12-23 | Stop reason: HOSPADM

## 2018-12-17 RX ORDER — DEXTROSE MONOHYDRATE 25 G/50ML
12.5 INJECTION, SOLUTION INTRAVENOUS PRN
Status: DISCONTINUED | OUTPATIENT
Start: 2018-12-17 | End: 2018-12-23 | Stop reason: HOSPADM

## 2018-12-17 RX ORDER — SODIUM CHLORIDE 9 MG/ML
INJECTION, SOLUTION INTRAVENOUS CONTINUOUS
Status: DISCONTINUED | OUTPATIENT
Start: 2018-12-17 | End: 2018-12-19

## 2018-12-17 RX ORDER — INSULIN GLARGINE 100 [IU]/ML
25 INJECTION, SOLUTION SUBCUTANEOUS NIGHTLY
Status: DISCONTINUED | OUTPATIENT
Start: 2018-12-17 | End: 2018-12-17

## 2018-12-17 RX ORDER — NICOTINE POLACRILEX 4 MG
15 LOZENGE BUCCAL PRN
Status: DISCONTINUED | OUTPATIENT
Start: 2018-12-17 | End: 2018-12-23 | Stop reason: HOSPADM

## 2018-12-17 RX ORDER — SIMVASTATIN 10 MG
10 TABLET ORAL NIGHTLY
Status: DISCONTINUED | OUTPATIENT
Start: 2018-12-17 | End: 2018-12-23 | Stop reason: HOSPADM

## 2018-12-17 RX ORDER — ASPIRIN 81 MG/1
81 TABLET, CHEWABLE ORAL DAILY
Status: DISCONTINUED | OUTPATIENT
Start: 2018-12-17 | End: 2018-12-23 | Stop reason: HOSPADM

## 2018-12-17 RX ORDER — ONDANSETRON 2 MG/ML
4 INJECTION INTRAMUSCULAR; INTRAVENOUS EVERY 6 HOURS PRN
Status: DISCONTINUED | OUTPATIENT
Start: 2018-12-17 | End: 2018-12-23 | Stop reason: HOSPADM

## 2018-12-17 RX ORDER — OXYBUTYNIN CHLORIDE 5 MG/1
5 TABLET ORAL 3 TIMES DAILY
Status: DISCONTINUED | OUTPATIENT
Start: 2018-12-17 | End: 2018-12-17

## 2018-12-17 RX ORDER — OXYBUTYNIN CHLORIDE 5 MG/1
5 TABLET ORAL 2 TIMES DAILY
Status: DISCONTINUED | OUTPATIENT
Start: 2018-12-17 | End: 2018-12-23 | Stop reason: HOSPADM

## 2018-12-17 RX ORDER — SODIUM CHLORIDE 0.9 % (FLUSH) 0.9 %
10 SYRINGE (ML) INJECTION PRN
Status: DISCONTINUED | OUTPATIENT
Start: 2018-12-17 | End: 2018-12-23 | Stop reason: HOSPADM

## 2018-12-17 RX ORDER — POLYETHYLENE GLYCOL 3350 17 G/17G
17 POWDER, FOR SOLUTION ORAL DAILY
Status: DISCONTINUED | OUTPATIENT
Start: 2018-12-17 | End: 2018-12-23 | Stop reason: HOSPADM

## 2018-12-17 RX ADMIN — MEROPENEM 1 G: 1 INJECTION, POWDER, FOR SOLUTION INTRAVENOUS at 17:14

## 2018-12-17 RX ADMIN — LINAGLIPTIN 5 MG: 5 TABLET, FILM COATED ORAL at 07:44

## 2018-12-17 RX ADMIN — INSULIN LISPRO 2 UNITS: 100 INJECTION, SOLUTION INTRAVENOUS; SUBCUTANEOUS at 17:19

## 2018-12-17 RX ADMIN — DOXYCYCLINE HYCLATE 100 MG: 100 TABLET, COATED ORAL at 10:25

## 2018-12-17 RX ADMIN — OXYBUTYNIN CHLORIDE 5 MG: 5 TABLET ORAL at 21:14

## 2018-12-17 RX ADMIN — POLYETHYLENE GLYCOL (3350) 17 G: 17 POWDER, FOR SOLUTION ORAL at 07:44

## 2018-12-17 RX ADMIN — SIMVASTATIN 10 MG: 10 TABLET, FILM COATED ORAL at 01:57

## 2018-12-17 RX ADMIN — INSULIN LISPRO 3 UNITS: 100 INJECTION, SOLUTION INTRAVENOUS; SUBCUTANEOUS at 21:15

## 2018-12-17 RX ADMIN — LEVOTHYROXINE SODIUM 75 MCG: 0.05 TABLET ORAL at 06:57

## 2018-12-17 RX ADMIN — ENOXAPARIN SODIUM 30 MG: 100 INJECTION SUBCUTANEOUS at 07:44

## 2018-12-17 RX ADMIN — SODIUM CHLORIDE: 9 INJECTION, SOLUTION INTRAVENOUS at 10:36

## 2018-12-17 RX ADMIN — SIMVASTATIN 10 MG: 10 TABLET, FILM COATED ORAL at 21:14

## 2018-12-17 RX ADMIN — INSULIN LISPRO 1 UNITS: 100 INJECTION, SOLUTION INTRAVENOUS; SUBCUTANEOUS at 07:50

## 2018-12-17 RX ADMIN — SODIUM CHLORIDE: 9 INJECTION, SOLUTION INTRAVENOUS at 02:55

## 2018-12-17 RX ADMIN — INSULIN GLARGINE 22 UNITS: 100 INJECTION, SOLUTION SUBCUTANEOUS at 21:17

## 2018-12-17 RX ADMIN — ASPIRIN 81 MG 81 MG: 81 TABLET ORAL at 07:44

## 2018-12-17 RX ADMIN — CASTOR OIL AND BALSAM, PERU: 788; 87 OINTMENT TOPICAL at 21:18

## 2018-12-17 RX ADMIN — SODIUM CHLORIDE: 9 INJECTION, SOLUTION INTRAVENOUS at 21:18

## 2018-12-17 RX ADMIN — DOXYCYCLINE HYCLATE 100 MG: 100 TABLET, COATED ORAL at 01:57

## 2018-12-17 RX ADMIN — INSULIN LISPRO 2 UNITS: 100 INJECTION, SOLUTION INTRAVENOUS; SUBCUTANEOUS at 12:03

## 2018-12-17 RX ADMIN — Medication 10 ML: at 10:39

## 2018-12-17 RX ADMIN — OXYBUTYNIN CHLORIDE 5 MG: 5 TABLET ORAL at 07:44

## 2018-12-17 RX ADMIN — OXYBUTYNIN CHLORIDE 5 MG: 5 TABLET ORAL at 01:57

## 2018-12-17 NOTE — PROGRESS NOTES
4 Eyes Skin Assessment     The patient is being assess for   Admission    I agree that 2 RN's have performed a thorough Head to Toe Skin Assessment on the patient. ALL assessment sites listed below have been assessed. Areas assessed by both nurses:  Nu Perez and Elaine  [x]   Head, Face, and Ears   [x]   Shoulders, Back, and Chest, Abdomen  [x]   Arms, Elbows, and Hands   [x]   Coccyx, Sacrum, and Ischium  [x]   Legs, Feet, and Heels        Top of head-red. Left great toe-red stage I. Preventative mepilex on coccyx and left heel. Rt heel unstageable DTI. Scattered and left elbow bruising. Rt gluteal fold with red stage I and x2 small purple areas. Rt hand swelling. BLE edema. **SHARE this note so that the co-signing nurse is able to place an eSignature**    Co-signer eSignature: Electronically signed by Adrianna Montero RN on 12/17/18 at 2:26 AM    Does the Patient have Skin Breakdown?   Yes LDA WOUND CARE was Initiated documentation include the Quyen-wound, Wound Assessment, Measurements, Dressing Treatment, Drainage, and Color\",          Deniz Prevention initiated:  Yes   Wound Care Orders initiated:  Yes      [address]  nurse consulted for Pressure Injury (Stage 3,4, Unstageable, DTI, NWPT, Complex wounds)and New or Established Ostomies:  Yes      Primary Nurse eSignature: Electronically signed by Vernell Lopez RN on 12/17/18 at 2:22 AM

## 2018-12-17 NOTE — CARE COORDINATION
Case Management Assessment  Initial Evaluation    Date/Time of Evaluation: 12/17/2018 10:37 AM  Assessment Completed by: Catalino Sky    Patient Name: Bhakti Schwartz  YOB: 1924  Diagnosis: UTI (urinary tract infection) [N39.0]  Date / Time: 12/16/2018  9:15 PM  Admission status/Date: Inpatient 12/16/2018  Chart Reviewed: Yes      Patient Interviewed: Yes   Family Interviewed:  No      Hospitalization in the last 30 days:  No    Contacts  :   Ricky Ghotra  Relationship to Patient: spouse  Phone Number:   [phone]  Alternate Contact:   Alberto Magana  Relationship to Patient:   son  Phone Number:   [phone]    Current PCP: Gene Alexandre MD    [address] [facility] [address]: Spouse/Significant Other, Children  Transportation: EMS transportation    Meal Preparation: per facility    Housing  Home Environment: LTC resident at [facility]  Steps: [address] to Return to Present Housing: Yes  Other Identified Issues: [address]  Currently active with [address] : No  Type of Home Care Services: None  Passport/Waiver : No  Passport/Waiver Services: NA    Durable Medical Equipment   DME Provider: NA  Equipment: per facility    Has Home O2 in place on admit:  No  Informed of need to bring portable home O2 tank on day of discharge for nursing to connect prior to leaving:   No  Verbalized agreement/Understanding:   Not Indicated    Community Service Affiliation  Dialysis:  No  Outpatient PT/OT: No    [facility]: No     CHF Clinic: No     Pulmonary Rehab: No  Pain Clinic: No  Community Mental Health: No    Wound Clinic: No     Other: NA    DISCHARGE PLAN: Plan: Return to LTC @ [facility] ([address] in [place]. [facility] will need 24-48 hour notification if pt will require IVABTX as they have specialty meds delivered from [place]. +ID consult noted. +CM following.

## 2018-12-17 NOTE — ED NOTES
Pt resting comfortably. VSS. Call light in reach.       Marco A Ervin, Replaced by [facility]  12/16/18 [id number]

## 2018-12-17 NOTE — ED PROVIDER NOTES
 insulin lispro (HUMALOG) injection vial 0-6 Units  0-6 Units Subcutaneous TID WC Omega Santa MD        insulin lispro (HUMALOG) injection vial 0-3 Units  0-3 Units Subcutaneous Nightly Omega Santa MD        glucose (GLUTOSE) 40 % oral gel 15 g  15 g Oral PRN Omega Santa MD        dextrose 50 % solution 12.5 g  12.5 g Intravenous PRN Omega Santa MD        glucagon (rDNA) injection 1 mg  1 mg Intramuscular PRN Omega Santa MD        dextrose 5 % solution  100 mL/hr Intravenous PRN Omega Santa MD         Allergies   Allergen Reactions    Ciprofloxacin     Pcn [Penicillins]     Sulfa Antibiotics     Ceftriaxone Rash       Nursing Notes Reviewed    Physical Exam:  ED Triage Vitals   BP Temp Temp Source Pulse Resp SpO2 Height Weight   12/16/18 2126 12/16/18 2123 12/16/18 2123 12/16/18 2125 12/16/18 2126 12/16/18 2125 12/16/18 2125 12/16/18 2125   125/64 99.3 °F (37.4 °C) Oral 85 17 96 % 5' 11.5\" (1.816 m) 159 lb (72.1 kg)     GENERAL APPEARANCE: Awake and alert. Cooperative. No acute distress. HEAD:Normocephalic. Atraumatic. EYES: EOM's grossly intact. Sclera anicteric. ENT: Mucous membranes are moist. Tolerates saliva. No trismus. NECK: Supple. No meningismus. Trachea midline. HEART: RRR. LUNGS: Respirations unlabored. CTAB  ABDOMEN: Soft. Non-tender. No guarding or rebound. EXTREMITIES: No acute deformities. SKIN: Warm and dry. NEUROLOGICAL: No gross facial drooping. Moves all 4 extremities spontaneously.     Physical Exam    I have reviewed and interpreted all of the currently availablelab results from this visit (if applicable):  Results for orders placed or performed during the hospital encounter of 12/16/18   CBC auto differential   Result Value Ref Range    WBC 15.5 (H) 4.0 - 11.0 K/uL    RBC 3.48 (L) 4.20 - 5.90 M/uL    Hemoglobin 10.8 (L) 13.5 - 17.5 g/dL    Hematocrit 32.9 (L) 40.5 - 52.5 %    MCV 94.5 80.0 - 100.0 fL    MCH 31.0 26.0 - 34.0 pg    MCHC 32.8 31.0 - (A) /HPF    Amorphous, UA 2+ (A) /HPF    Crystals 1+ Triple Phos (A) /HPF   POCT Glucose   Result Value Ref Range    POC Glucose 117 (H) 70 - 99 mg/dl    Performed on ACCU-CHEK    POCT Glucose   Result Value Ref Range    POC Glucose 133 (H) 70 - 99 mg/dl    Performed on ACCU-CHEK         Procedures    MDM:  After my initial evaluation, and is very difficult to ascertain what the exact problem that is ongoing with the patient is. The patient had blood work sent, along with urinalysis. The patient's urinalysis shows signs of a possible early UTI. The patient also has signs of acute kidney injury, with worsening kidney function. Patient is a slight elevation of white blood cell count. I am going to initiate IV fluids. I do feel the patient most likely dehydrated, and will require treatment. Patient's urinalysis is going to require treatment with IV antibiotics. After the patient's upper respiratory metabolic profile shows acute kidney injury, I do feel the patient will require admission to the hospital for further treatment. I did speak with the hospitalist, was willing to except the patient and is putting orders in the computer. Clinical Impression:  1.  Dehydration      (Please note that portions of this note Solmon Robin been completed with a voice recognition program. Efforts were made to edit the dictations but occasionally words are mis-transcribed.)    MD Armand Yancey MD  12/17/18 [id number]

## 2018-12-17 NOTE — PROGRESS NOTES
Inpatient Occupational Therapy  Evaluation and Treatment    Unit: 2West   Date:  12/17/2018  Patient Name:    Fredy Greco  Admitting diagnosis:  UTI (urinary tract infection) [N39.0]  Admit Date:  12/16/2018  Precautions/Restrictions/WB Status/ Lines/ Wounds/ Oxygen: fall risk, IV, lei catheter, confusion   Treatment Time:  10:04-10:40  Treatment Number: 1    Patient Goals for Therapy:  Did not state    Discharge Recommendations: return to LTC  AM-PAC Score: 10    DME needs for discharge: defer       Preadmission Environment:    Pt. Lives in [address] [place] [address] owned: wheelchair    Preadmission Status:  Pt.  not able to drive. Pt. Had assistance from aides for ADLs. Patient is able to feed self with setup  Pt. Is able to complete bed mobility with SBA. Transfers from EOB to wheelchair with A of 1 and gait belt. Pain:    Rating:  Location:  Description:  Requesting Pain Med? Nursing notified of patients pain level. Cognition:    A&Ox4  Follows 1 step and 2 step commands. Patient became irritable easily but able to redirect     Upper Extremity ROM:    B shoulder flexion to 100 degrees. Upper Extremity Strength:    Unable to formally assess. Able to use B UEs for transfers    Upper Extremity Sensation:    No apparent deficits. Upper Extremity Proprioception:    No apparent deficits.     Skin Integrity:  Grossly intact    Coordination and Tone:  impaired    Balance:    Static Sitting: fair  Dynamic Sitting: fair  Static Standing: poor  Dynamic Standing: poor     Bed mobility: NT- patient up to chair   Scooting in sitting: min A    Transfers:    Sit to stand: mod A of 2 from chair/wheelchair/BSC  Stand to sit: mod A of 2  Chair > wheelchair > BSC > wheelchair > chair: mod A of 2    Dressing:   UE: NT  LE: max A to don briefs   Bathing:  UE: NT  LE: max A to complete pericare  Eating:  NT    Positioning Needs: sitting upright in recliner with all needs within reach     Exercise / Activities Initiated:

## 2018-12-17 NOTE — PROGRESS NOTES
Inpatient Physical Therapy Evaluation and Treatment    Unit: 2West  Date:  12/18/2018  Patient Name:    Diana Fernandez  Admitting diagnosis:  UTI (urinary tract infection) [N39.0]  Admit Date:  12/16/2018  Precautions/Restrictions/WB Status/ Lines/ Wounds/ Oxygen:  Contact (ESBL), fall risk, baseline dementia, IV, lei catheter   Treatment Time:  10:05 - 10:40  Treatment Number:  1     Patient Goals for Therapy: \"I need to go to the bathroom\"        Discharge Recommendations: return to LTC with PT  DME needs for discharge:  Needs met     AM-PAC Mobility Score      AM-PAC Inpatient Mobility without Stair Climbing Raw Score : 10    Preadmission Environment  Per telephone conversation with pt's nurse at [facility]  Pt. Is a LTC resident at [facility] in [address] [place] [address] Dr jennifer: wheelchair     Preadmission Status:Per telephone conversation with pt's nurse at [facility].  not able to drive. Pt. Had assistance from aides for ADLs. Patient is able to feed self with setup  Pt. Is able to complete bed mobility with SBA. Transfers from EOB to wheelchair with A of 1 and gait belt. Pain  No c/o pain     Cognition    A&Ox4, patient appropriate and cooperative. Patient reclined in chair with no family present. Follows 1 step commands. Pt benefits from simple, direct instructions. Signs of confusion noted, for example, pt requesting to use bathroom, then stated Yudi Burch are we going in here\" as we entered the bathroom. Upper Extremity ROM/Strength  Please see OT evaluation. Lower Extremity ROM / Strength    AROM WFL   Formal MMT deferred; BLE strength impaired as assessed through mobility   Lower Extremity Sensation    No apparent deficits. Lower Extremity Proprioception:   No apparent deficits.     Coordination and Tone  WNL    Balance  Static Sitting: fair   Dynamic Sitting: poor  Static Standing: poor  Dynamic Standing: poor    Bed mobility  -DNT, pt up in chair and remaining up in chair   Supine to sit: include: fatigue, confusion     Plan    To be seen 3-5x/week while in acute care setting for therapeutic exercises, bed mobility, transfers, progressive gait training, balance training, and family/patient education. Timed Code Treatment Minutes:  25 minutes  Total Treatment Time:  35 minutes    Loki Hernandez PT, DPT #[id number]     If patient discharges from this facility prior to next visit, this note will serve as the Discharge Summary.

## 2018-12-18 LAB
ANION GAP SERPL CALCULATED.3IONS-SCNC: 8 MMOL/L (ref 3–16)
BUN BLDV-MCNC: 58 MG/DL (ref 7–20)
CALCIUM SERPL-MCNC: 8.8 MG/DL (ref 8.3–10.6)
CHLORIDE BLD-SCNC: 108 MMOL/L (ref 99–110)
CO2: 25 MMOL/L (ref 21–32)
CREAT SERPL-MCNC: 1.8 MG/DL (ref 0.8–1.3)
GFR AFRICAN AMERICAN: 43
GFR NON-AFRICAN AMERICAN: 35
GLUCOSE BLD-MCNC: 131 MG/DL (ref 70–99)
GLUCOSE BLD-MCNC: 230 MG/DL (ref 70–99)
GLUCOSE BLD-MCNC: 302 MG/DL (ref 70–99)
GLUCOSE BLD-MCNC: 319 MG/DL (ref 70–99)
GLUCOSE BLD-MCNC: 321 MG/DL (ref 70–99)
GLUCOSE BLD-MCNC: 322 MG/DL (ref 70–99)
PERFORMED ON: ABNORMAL
POTASSIUM SERPL-SCNC: 3.6 MMOL/L (ref 3.5–5.1)
SODIUM BLD-SCNC: 141 MMOL/L (ref 136–145)

## 2018-12-18 PROCEDURE — 80048 BASIC METABOLIC PNL TOTAL CA: CPT

## 2018-12-18 PROCEDURE — 97161 PT EVAL LOW COMPLEX 20 MIN: CPT

## 2018-12-18 PROCEDURE — 99221 1ST HOSP IP/OBS SF/LOW 40: CPT | Performed by: INTERNAL MEDICINE

## 2018-12-18 PROCEDURE — 2580000003 HC RX 258: Performed by: HOSPITALIST

## 2018-12-18 PROCEDURE — 1200000000 HC SEMI PRIVATE

## 2018-12-18 PROCEDURE — 6360000002 HC RX W HCPCS: Performed by: INTERNAL MEDICINE

## 2018-12-18 PROCEDURE — 97530 THERAPEUTIC ACTIVITIES: CPT

## 2018-12-18 PROCEDURE — G8987 SELF CARE CURRENT STATUS: HCPCS

## 2018-12-18 PROCEDURE — 36415 COLL VENOUS BLD VENIPUNCTURE: CPT

## 2018-12-18 PROCEDURE — 97535 SELF CARE MNGMENT TRAINING: CPT

## 2018-12-18 PROCEDURE — 6360000002 HC RX W HCPCS: Performed by: HOSPITALIST

## 2018-12-18 PROCEDURE — 99232 SBSQ HOSP IP/OBS MODERATE 35: CPT | Performed by: INTERNAL MEDICINE

## 2018-12-18 PROCEDURE — G8978 MOBILITY CURRENT STATUS: HCPCS

## 2018-12-18 PROCEDURE — 6370000000 HC RX 637 (ALT 250 FOR IP): Performed by: HOSPITALIST

## 2018-12-18 PROCEDURE — G8979 MOBILITY GOAL STATUS: HCPCS

## 2018-12-18 PROCEDURE — 97165 OT EVAL LOW COMPLEX 30 MIN: CPT

## 2018-12-18 PROCEDURE — 2580000003 HC RX 258: Performed by: INTERNAL MEDICINE

## 2018-12-18 PROCEDURE — 6370000000 HC RX 637 (ALT 250 FOR IP): Performed by: PHYSICIAN ASSISTANT

## 2018-12-18 PROCEDURE — G8988 SELF CARE GOAL STATUS: HCPCS

## 2018-12-18 RX ADMIN — INSULIN LISPRO 6 UNITS: 100 INJECTION, SOLUTION INTRAVENOUS; SUBCUTANEOUS at 16:49

## 2018-12-18 RX ADMIN — MEROPENEM 1 G: 1 INJECTION, POWDER, FOR SOLUTION INTRAVENOUS at 06:17

## 2018-12-18 RX ADMIN — LEVOTHYROXINE SODIUM 75 MCG: 0.05 TABLET ORAL at 06:17

## 2018-12-18 RX ADMIN — SODIUM CHLORIDE: 9 INJECTION, SOLUTION INTRAVENOUS at 06:24

## 2018-12-18 RX ADMIN — SODIUM CHLORIDE: 9 INJECTION, SOLUTION INTRAVENOUS at 16:45

## 2018-12-18 RX ADMIN — ENOXAPARIN SODIUM 30 MG: 100 INJECTION SUBCUTANEOUS at 09:16

## 2018-12-18 RX ADMIN — INSULIN GLARGINE 22 UNITS: 100 INJECTION, SOLUTION SUBCUTANEOUS at 22:05

## 2018-12-18 RX ADMIN — SIMVASTATIN 10 MG: 10 TABLET, FILM COATED ORAL at 22:02

## 2018-12-18 RX ADMIN — POLYETHYLENE GLYCOL (3350) 17 G: 17 POWDER, FOR SOLUTION ORAL at 09:20

## 2018-12-18 RX ADMIN — ASPIRIN 81 MG 81 MG: 81 TABLET ORAL at 09:17

## 2018-12-18 RX ADMIN — INSULIN LISPRO 4 UNITS: 100 INJECTION, SOLUTION INTRAVENOUS; SUBCUTANEOUS at 07:48

## 2018-12-18 RX ADMIN — CASTOR OIL AND BALSAM, PERU: 788; 87 OINTMENT TOPICAL at 07:48

## 2018-12-18 RX ADMIN — CASTOR OIL AND BALSAM, PERU: 788; 87 OINTMENT TOPICAL at 22:03

## 2018-12-18 RX ADMIN — OXYBUTYNIN CHLORIDE 5 MG: 5 TABLET ORAL at 09:17

## 2018-12-18 RX ADMIN — OXYBUTYNIN CHLORIDE 5 MG: 5 TABLET ORAL at 22:02

## 2018-12-18 RX ADMIN — INSULIN LISPRO 12 UNITS: 100 INJECTION, SOLUTION INTRAVENOUS; SUBCUTANEOUS at 12:43

## 2018-12-18 RX ADMIN — LINAGLIPTIN 5 MG: 5 TABLET, FILM COATED ORAL at 09:17

## 2018-12-18 NOTE — PROGRESS NOTES
[address] to verify replacement or change of catheter was 11/21/18. [facility] and is due on 12/21/18.  Will notify MD.

## 2018-12-18 NOTE — PROGRESS NOTES
ID note (consult to be dictated):    A: suprapubic catheter      Probable UTI with E coli bacteremia      Hx of infection with ESBL producing E coli so it is quite possible that the blood isolate is an ESBL . JAKE    Rec:  Agree with meropenem; I think 500 mg q8h would be adequate given a likely urinary tract source and the patient's renal dysfunction.

## 2018-12-19 LAB
A/G RATIO: 0.6 (ref 1.1–2.2)
ALBUMIN SERPL-MCNC: 2.1 G/DL (ref 3.4–5)
ALP BLD-CCNC: 108 U/L (ref 40–129)
ALT SERPL-CCNC: 20 U/L (ref 10–40)
ANION GAP SERPL CALCULATED.3IONS-SCNC: 7 MMOL/L (ref 3–16)
AST SERPL-CCNC: 17 U/L (ref 15–37)
BASOPHILS ABSOLUTE: 0 K/UL (ref 0–0.2)
BASOPHILS RELATIVE PERCENT: 0.2 %
BILIRUB SERPL-MCNC: 1.1 MG/DL (ref 0–1)
BLOOD CULTURE, ROUTINE: ABNORMAL
BUN BLDV-MCNC: 59 MG/DL (ref 7–20)
CALCIUM SERPL-MCNC: 8.5 MG/DL (ref 8.3–10.6)
CHLORIDE BLD-SCNC: 111 MMOL/L (ref 99–110)
CO2: 24 MMOL/L (ref 21–32)
CREAT SERPL-MCNC: 2 MG/DL (ref 0.8–1.3)
EOSINOPHILS ABSOLUTE: 0.1 K/UL (ref 0–0.6)
EOSINOPHILS RELATIVE PERCENT: 0.4 %
GFR AFRICAN AMERICAN: 38
GFR NON-AFRICAN AMERICAN: 31
GLOBULIN: 3.3 G/DL
GLUCOSE BLD-MCNC: 141 MG/DL (ref 70–99)
GLUCOSE BLD-MCNC: 160 MG/DL (ref 70–99)
GLUCOSE BLD-MCNC: 161 MG/DL (ref 70–99)
GLUCOSE BLD-MCNC: 171 MG/DL (ref 70–99)
GLUCOSE BLD-MCNC: 218 MG/DL (ref 70–99)
GLUCOSE BLD-MCNC: 257 MG/DL (ref 70–99)
HCT VFR BLD CALC: 30.6 % (ref 40.5–52.5)
HEMOGLOBIN: 9.9 G/DL (ref 13.5–17.5)
LYMPHOCYTES ABSOLUTE: 0.5 K/UL (ref 1–5.1)
LYMPHOCYTES RELATIVE PERCENT: 3.3 %
MCH RBC QN AUTO: 30.4 PG (ref 26–34)
MCHC RBC AUTO-ENTMCNC: 32.3 G/DL (ref 31–36)
MCV RBC AUTO: 94 FL (ref 80–100)
MONOCYTES ABSOLUTE: 1.4 K/UL (ref 0–1.3)
MONOCYTES RELATIVE PERCENT: 9.6 %
NEUTROPHILS ABSOLUTE: 12.4 K/UL (ref 1.7–7.7)
NEUTROPHILS RELATIVE PERCENT: 86.5 %
ORGANISM: ABNORMAL
PDW BLD-RTO: 16.6 % (ref 12.4–15.4)
PERFORMED ON: ABNORMAL
PLATELET # BLD: 199 K/UL (ref 135–450)
PMV BLD AUTO: 8.7 FL (ref 5–10.5)
POTASSIUM SERPL-SCNC: 3.4 MMOL/L (ref 3.5–5.1)
RBC # BLD: 3.25 M/UL (ref 4.2–5.9)
SODIUM BLD-SCNC: 142 MMOL/L (ref 136–145)
TOTAL PROTEIN: 5.4 G/DL (ref 6.4–8.2)
URINE CULTURE, ROUTINE: ABNORMAL
WBC # BLD: 14.4 K/UL (ref 4–11)

## 2018-12-19 PROCEDURE — 6360000002 HC RX W HCPCS: Performed by: HOSPITALIST

## 2018-12-19 PROCEDURE — 2580000003 HC RX 258: Performed by: INTERNAL MEDICINE

## 2018-12-19 PROCEDURE — 2580000003 HC RX 258: Performed by: HOSPITALIST

## 2018-12-19 PROCEDURE — 99232 SBSQ HOSP IP/OBS MODERATE 35: CPT | Performed by: INTERNAL MEDICINE

## 2018-12-19 PROCEDURE — 80053 COMPREHEN METABOLIC PANEL: CPT

## 2018-12-19 PROCEDURE — 85025 COMPLETE CBC W/AUTO DIFF WBC: CPT

## 2018-12-19 PROCEDURE — 6360000002 HC RX W HCPCS: Performed by: INTERNAL MEDICINE

## 2018-12-19 PROCEDURE — 1200000000 HC SEMI PRIVATE

## 2018-12-19 PROCEDURE — 6370000000 HC RX 637 (ALT 250 FOR IP): Performed by: HOSPITALIST

## 2018-12-19 PROCEDURE — 6370000000 HC RX 637 (ALT 250 FOR IP): Performed by: PHYSICIAN ASSISTANT

## 2018-12-19 PROCEDURE — 36415 COLL VENOUS BLD VENIPUNCTURE: CPT

## 2018-12-19 RX ORDER — FUROSEMIDE 10 MG/ML
40 INJECTION INTRAMUSCULAR; INTRAVENOUS DAILY
Status: DISCONTINUED | OUTPATIENT
Start: 2018-12-19 | End: 2018-12-21

## 2018-12-19 RX ORDER — POTASSIUM CHLORIDE 20 MEQ/1
20 TABLET, EXTENDED RELEASE ORAL ONCE
Status: COMPLETED | OUTPATIENT
Start: 2018-12-19 | End: 2018-12-19

## 2018-12-19 RX ORDER — POTASSIUM CHLORIDE 20 MEQ/1
40 TABLET, EXTENDED RELEASE ORAL PRN
Status: DISCONTINUED | OUTPATIENT
Start: 2018-12-19 | End: 2018-12-19

## 2018-12-19 RX ORDER — POTASSIUM CHLORIDE 20MEQ/15ML
40 LIQUID (ML) ORAL PRN
Status: DISCONTINUED | OUTPATIENT
Start: 2018-12-19 | End: 2018-12-19

## 2018-12-19 RX ORDER — POTASSIUM CHLORIDE 7.45 MG/ML
10 INJECTION INTRAVENOUS PRN
Status: DISCONTINUED | OUTPATIENT
Start: 2018-12-19 | End: 2018-12-19

## 2018-12-19 RX ADMIN — ASPIRIN 81 MG 81 MG: 81 TABLET ORAL at 08:00

## 2018-12-19 RX ADMIN — INSULIN LISPRO 5 UNITS: 100 INJECTION, SOLUTION INTRAVENOUS; SUBCUTANEOUS at 20:54

## 2018-12-19 RX ADMIN — POTASSIUM CHLORIDE 20 MEQ: 20 TABLET, EXTENDED RELEASE ORAL at 11:09

## 2018-12-19 RX ADMIN — ENOXAPARIN SODIUM 30 MG: 100 INJECTION SUBCUTANEOUS at 08:00

## 2018-12-19 RX ADMIN — INSULIN LISPRO 3 UNITS: 100 INJECTION, SOLUTION INTRAVENOUS; SUBCUTANEOUS at 11:56

## 2018-12-19 RX ADMIN — Medication 10 ML: at 08:01

## 2018-12-19 RX ADMIN — INSULIN LISPRO 3 UNITS: 100 INJECTION, SOLUTION INTRAVENOUS; SUBCUTANEOUS at 08:01

## 2018-12-19 RX ADMIN — FUROSEMIDE 40 MG: 10 INJECTION, SOLUTION INTRAMUSCULAR; INTRAVENOUS at 11:09

## 2018-12-19 RX ADMIN — LEVOTHYROXINE SODIUM 75 MCG: 0.05 TABLET ORAL at 05:43

## 2018-12-19 RX ADMIN — OXYBUTYNIN CHLORIDE 5 MG: 5 TABLET ORAL at 08:00

## 2018-12-19 RX ADMIN — INSULIN LISPRO 6 UNITS: 100 INJECTION, SOLUTION INTRAVENOUS; SUBCUTANEOUS at 16:58

## 2018-12-19 RX ADMIN — LINAGLIPTIN 5 MG: 5 TABLET, FILM COATED ORAL at 08:00

## 2018-12-19 RX ADMIN — POLYETHYLENE GLYCOL (3350) 17 G: 17 POWDER, FOR SOLUTION ORAL at 08:00

## 2018-12-19 RX ADMIN — MEROPENEM 500 MG: 500 INJECTION, POWDER, FOR SOLUTION INTRAVENOUS at 00:20

## 2018-12-19 RX ADMIN — CASTOR OIL AND BALSAM, PERU: 788; 87 OINTMENT TOPICAL at 08:01

## 2018-12-19 RX ADMIN — SIMVASTATIN 10 MG: 10 TABLET, FILM COATED ORAL at 20:52

## 2018-12-19 RX ADMIN — Medication 10 ML: at 11:09

## 2018-12-19 RX ADMIN — MEROPENEM 500 MG: 500 INJECTION, POWDER, FOR SOLUTION INTRAVENOUS at 15:58

## 2018-12-19 RX ADMIN — Medication 10 ML: at 20:52

## 2018-12-19 RX ADMIN — MEROPENEM 500 MG: 500 INJECTION, POWDER, FOR SOLUTION INTRAVENOUS at 08:00

## 2018-12-19 RX ADMIN — OXYBUTYNIN CHLORIDE 5 MG: 5 TABLET ORAL at 20:52

## 2018-12-19 RX ADMIN — CASTOR OIL AND BALSAM, PERU: 788; 87 OINTMENT TOPICAL at 22:21

## 2018-12-19 RX ADMIN — INSULIN GLARGINE 22 UNITS: 100 INJECTION, SOLUTION SUBCUTANEOUS at 20:54

## 2018-12-19 ASSESSMENT — PAIN SCALES - GENERAL
PAINLEVEL_OUTOF10: 0
PAINLEVEL_OUTOF10: 0

## 2018-12-19 NOTE — PROGRESS NOTES
midline. Normal thyroid. Resp: No accessory muscle use. No crackles. No wheezes. No rhonchi. No dullness on percussion. CV: Regular rate. Regular rhythm. No murmur or rub. No edema. GI: Non-tender. Non-distended. No masses. No organomegaly. Normal bowel sounds. No hernia. Skin: Warm and dry. No nodule on exposed extremities. No rash on exposed extremities. Lymph: No cervical LAD. No supraclavicular LAD. M/S: No cyanosis. No joint deformity. No clubbing. Neuro: Awake.   Not oriented          TOMI Parker.      Scheduled Meds:   insulin lispro  0-18 Units Subcutaneous TID WC    insulin lispro  0-9 Units Subcutaneous Nightly    meropenem  500 mg Intravenous Q8H    aspirin  81 mg Oral Daily    levothyroxine  75 mcg Oral Daily    linagliptin  5 mg Oral Daily    polyethylene glycol  17 g Oral Daily    simvastatin  10 mg Oral Nightly    sodium chloride flush  10 mL Intravenous 2 times per day    enoxaparin  30 mg Subcutaneous Daily    insulin glargine  22 Units Subcutaneous Nightly    oxybutynin  5 mg Oral BID    VENELEX   Topical BID       Continuous Infusions:   sodium chloride 100 mL/hr at 12/19/18 0012    dextrose         PRN Meds:  sodium chloride flush, ondansetron, glucose, dextrose, glucagon (rDNA), dextrose      Data:  CBC:   Recent Labs      12/16/18 2133 12/17/18 0627 12/19/18   0630   WBC  15.5*  14.6*  14.4*   HGB  10.8*  10.5*  9.9*   HCT  32.9*  32.7*  30.6*   MCV  94.5  96.2  94.0   PLT  199  170  199     BMP:   Recent Labs      12/17/18 0626 12/18/18   0625  12/19/18   0630   NA  142  141  142   K  4.1  3.6  3.4*   CL  107  108  111*   CO2  24  25  24   BUN  56*  58*  59*   CREATININE  1.6*  1.8*  2.0*     LIVER PROFILE:   Recent Labs      12/16/18 2133 12/17/18 0626  12/19/18   0630   AST  52*  38*  17   ALT  51*  40  20   BILIDIR   --   0.4*   --    BILITOT  1.4*  1.4*  1.1*   ALKPHOS  160*  144*  108     CULTURES    Urine cx: GN ameena >100,000, sensitivities 15

## 2018-12-19 NOTE — PROGRESS NOTES
Pt a/o to self only. Sitting up to chair. fsbs was 171, 3 units SSI given. 1 hr after IV Lasix given, pt has 450 ml yellow clear urine empted from suprapubic cath. Call light within reach. Bed alarm is on. Will monitor.

## 2018-12-19 NOTE — CONSULTS
hypertension, insomnia, pneumonia, peripheral vascular  disease, peripheral neuropathy, schizophrenia, thyroid disease, and  transient ischemic attack. PAST SURGICAL HISTORY:  Arthroscopy, suprapubic catheter, prostatectomy,  and tonsillectomy. FAMILY HISTORY:  Noncontributory. SOCIAL HISTORY:  The patient lives in the [address] [place] [address]. He is a former smoker. He does not use alcohol or illegal drugs. CURRENT MEDICATIONS:  Meropenem, Ditropan, _____, Tradjenta, Synthroid,  insulin, enoxaparin, aspirin. ALLERGIES:  CIPROFLOXACIN, PENICILLIN, SULFA, CEFTRIAXONE. PHYSICAL EXAMINATION:  GENERAL:  The patient is a thin elderly male, in no acute distress. VITAL SIGNS:  Temperature 97.1, heart rate 89, respirations 20, blood  pressure 115/66. HEENT:  Eyes:  PERRL. No conjunctival lesions. Oral  Cavity:  No mucosal lesions. NECK:  Supple, without lymphadenopathy. There is no remarkable  supraclavicular lymphadenopathy. CHEST:  Clear to auscultation. Respirations are unlabored. CARDIOVASCULAR:  Regular rhythm, S1 and S2 within normal limits. There  are no murmur, rubs, or gallops. ABDOMEN:  Soft, nontender, without mass or hepatosplenomegaly. EXTREMITIES:  Without clubbing, cyanosis, or peripheral edema. NEUROLOGIC EXAM:  The patient seems confused. He does not know where he  lives. He was aware that he was at a [facility], but was unsure  which one. ASSESSMENT:  This patient with a suprapubic catheter presents with E.  coli bacteremia, which I would think is most likely coming from a  urinary tract source. It is quite possible the E. coli may be an ESBL   since the patient has had colonization or infection with an  ESBL producing strain of E. coli in the past.  The patient has acute  kidney injury. I agree with giving meropenem.   I think 500 mg every eight hours would  be adequate given the patient's renal dysfunction and the fact that the  patient likely has a urinary tract source for his bacteremia. I appreciate the opportunity to participate in this patient's care.         Leo Torres MD    D: 12/18/2018 16:23:31       T: 12/18/2018 20:56:22     /[email or web address]  Job#: [id number]     Doc#: [id number]    CC:

## 2018-12-19 NOTE — PROGRESS NOTES
Pt a/o to self only. FSBS was 230, 6 units SSI given. Loss of PIV, leaking, removed at this time with dressing applied. 1725- 3x sticks unsuccessful, clinical  called to assess for PIV. Pt sitting up to chair. PCA feeding pt puree diet and honey thick liquids. Tolerating well so far. Call light within reach. Chair alarm is on. Will monitor.

## 2018-12-19 NOTE — PROGRESS NOTES
Pt a/o to self only. Up to chair sitting. Milk honey thick given and tolerated well. IVF's stopped at 1050 per Dr. Joshua Urbina rounding. Lasix 40 mg IV given. Suprapubic cath in place with isra color urine. Will monitor urine output. Potassium 20 meq PO given with applesauce. Pt tolerated well. Call light within reach. Chair alarm is on. Will monitor.

## 2018-12-20 ENCOUNTER — TELEPHONE (OUTPATIENT)
Dept: INFECTIOUS DISEASES | Age: 83
End: 2018-12-20

## 2018-12-20 ENCOUNTER — APPOINTMENT (OUTPATIENT)
Dept: ULTRASOUND IMAGING | Age: 83
DRG: 698 | End: 2018-12-20
Payer: MEDICARE

## 2018-12-20 LAB
ANION GAP SERPL CALCULATED.3IONS-SCNC: 10 MMOL/L (ref 3–16)
BASOPHILS ABSOLUTE: 0 K/UL (ref 0–0.2)
BASOPHILS RELATIVE PERCENT: 0.1 %
BUN BLDV-MCNC: 64 MG/DL (ref 7–20)
CALCIUM SERPL-MCNC: 9.1 MG/DL (ref 8.3–10.6)
CHLORIDE BLD-SCNC: 110 MMOL/L (ref 99–110)
CO2: 28 MMOL/L (ref 21–32)
CREAT SERPL-MCNC: 2 MG/DL (ref 0.8–1.3)
EOSINOPHILS ABSOLUTE: 0.3 K/UL (ref 0–0.6)
EOSINOPHILS RELATIVE PERCENT: 1.8 %
GFR AFRICAN AMERICAN: 38
GFR NON-AFRICAN AMERICAN: 31
GLUCOSE BLD-MCNC: 108 MG/DL (ref 70–99)
GLUCOSE BLD-MCNC: 116 MG/DL (ref 70–99)
GLUCOSE BLD-MCNC: 116 MG/DL (ref 70–99)
GLUCOSE BLD-MCNC: 135 MG/DL (ref 70–99)
GLUCOSE BLD-MCNC: 163 MG/DL (ref 70–99)
GLUCOSE BLD-MCNC: 185 MG/DL (ref 70–99)
HCT VFR BLD CALC: 32.4 % (ref 40.5–52.5)
HEMOGLOBIN: 10.5 G/DL (ref 13.5–17.5)
LYMPHOCYTES ABSOLUTE: 0.8 K/UL (ref 1–5.1)
LYMPHOCYTES RELATIVE PERCENT: 5.3 %
MCH RBC QN AUTO: 30.6 PG (ref 26–34)
MCHC RBC AUTO-ENTMCNC: 32.3 G/DL (ref 31–36)
MCV RBC AUTO: 94.7 FL (ref 80–100)
MONOCYTES ABSOLUTE: 1.3 K/UL (ref 0–1.3)
MONOCYTES RELATIVE PERCENT: 8.8 %
NEUTROPHILS ABSOLUTE: 12.1 K/UL (ref 1.7–7.7)
NEUTROPHILS RELATIVE PERCENT: 84 %
PDW BLD-RTO: 16.1 % (ref 12.4–15.4)
PERFORMED ON: ABNORMAL
PLATELET # BLD: 240 K/UL (ref 135–450)
PMV BLD AUTO: 8.4 FL (ref 5–10.5)
POTASSIUM SERPL-SCNC: 3.3 MMOL/L (ref 3.5–5.1)
RBC # BLD: 3.42 M/UL (ref 4.2–5.9)
SODIUM BLD-SCNC: 148 MMOL/L (ref 136–145)
WBC # BLD: 14.4 K/UL (ref 4–11)

## 2018-12-20 PROCEDURE — 6370000000 HC RX 637 (ALT 250 FOR IP): Performed by: PHYSICIAN ASSISTANT

## 2018-12-20 PROCEDURE — 99232 SBSQ HOSP IP/OBS MODERATE 35: CPT | Performed by: INTERNAL MEDICINE

## 2018-12-20 PROCEDURE — 80048 BASIC METABOLIC PNL TOTAL CA: CPT

## 2018-12-20 PROCEDURE — 6360000002 HC RX W HCPCS: Performed by: INTERNAL MEDICINE

## 2018-12-20 PROCEDURE — 6370000000 HC RX 637 (ALT 250 FOR IP): Performed by: HOSPITALIST

## 2018-12-20 PROCEDURE — 2580000003 HC RX 258: Performed by: INTERNAL MEDICINE

## 2018-12-20 PROCEDURE — 76770 US EXAM ABDO BACK WALL COMP: CPT

## 2018-12-20 PROCEDURE — 36415 COLL VENOUS BLD VENIPUNCTURE: CPT

## 2018-12-20 PROCEDURE — 1200000000 HC SEMI PRIVATE

## 2018-12-20 PROCEDURE — 6360000002 HC RX W HCPCS: Performed by: HOSPITALIST

## 2018-12-20 PROCEDURE — 85025 COMPLETE CBC W/AUTO DIFF WBC: CPT

## 2018-12-20 PROCEDURE — 2580000003 HC RX 258: Performed by: HOSPITALIST

## 2018-12-20 RX ADMIN — ENOXAPARIN SODIUM 30 MG: 100 INJECTION SUBCUTANEOUS at 08:50

## 2018-12-20 RX ADMIN — MEROPENEM 500 MG: 500 INJECTION, POWDER, FOR SOLUTION INTRAVENOUS at 17:21

## 2018-12-20 RX ADMIN — INSULIN GLARGINE 22 UNITS: 100 INJECTION, SOLUTION SUBCUTANEOUS at 21:51

## 2018-12-20 RX ADMIN — LEVOTHYROXINE SODIUM 75 MCG: 0.05 TABLET ORAL at 06:03

## 2018-12-20 RX ADMIN — Medication 10 ML: at 21:51

## 2018-12-20 RX ADMIN — LINAGLIPTIN 5 MG: 5 TABLET, FILM COATED ORAL at 08:50

## 2018-12-20 RX ADMIN — ASPIRIN 81 MG 81 MG: 81 TABLET ORAL at 08:50

## 2018-12-20 RX ADMIN — INSULIN LISPRO 3 UNITS: 100 INJECTION, SOLUTION INTRAVENOUS; SUBCUTANEOUS at 12:56

## 2018-12-20 RX ADMIN — OXYBUTYNIN CHLORIDE 5 MG: 5 TABLET ORAL at 21:51

## 2018-12-20 RX ADMIN — CASTOR OIL AND BALSAM, PERU: 788; 87 OINTMENT TOPICAL at 09:08

## 2018-12-20 RX ADMIN — CASTOR OIL AND BALSAM, PERU: 788; 87 OINTMENT TOPICAL at 21:51

## 2018-12-20 RX ADMIN — OXYBUTYNIN CHLORIDE 5 MG: 5 TABLET ORAL at 08:50

## 2018-12-20 RX ADMIN — SIMVASTATIN 10 MG: 10 TABLET, FILM COATED ORAL at 21:51

## 2018-12-20 RX ADMIN — INSULIN LISPRO 2 UNITS: 100 INJECTION, SOLUTION INTRAVENOUS; SUBCUTANEOUS at 21:52

## 2018-12-20 RX ADMIN — MEROPENEM 500 MG: 500 INJECTION, POWDER, FOR SOLUTION INTRAVENOUS at 08:50

## 2018-12-20 RX ADMIN — MEROPENEM 500 MG: 500 INJECTION, POWDER, FOR SOLUTION INTRAVENOUS at 00:19

## 2018-12-20 RX ADMIN — POLYETHYLENE GLYCOL (3350) 17 G: 17 POWDER, FOR SOLUTION ORAL at 08:50

## 2018-12-20 RX ADMIN — Medication 10 ML: at 08:51

## 2018-12-20 RX ADMIN — FUROSEMIDE 40 MG: 10 INJECTION, SOLUTION INTRAMUSCULAR; INTRAVENOUS at 08:50

## 2018-12-20 ASSESSMENT — PAIN SCALES - GENERAL
PAINLEVEL_OUTOF10: 0

## 2018-12-20 NOTE — PROGRESS NOTES
Dr. Leana Lynn MD from [facility], called to speak with MD taking care of pt. Dr. Dawood Hicks made aware and asked Mar, [address], to call Dr. Leana Lynn back and answer his questions. Sherrill Farias, [address], is speaking to Dr. Leana Lynn now about POC.

## 2018-12-20 NOTE — PLAN OF CARE
Problem: Falls - Risk of:  Goal: Absence of physical injury  Absence of physical injury   Outcome: Ongoing      Problem: Daily Care:  Goal: Daily care needs are met  Daily care needs are met   Outcome: Ongoing      Problem:  Activity Intolerance:  Goal: Ability to tolerate increased activity will improve  Ability to tolerate increased activity will improve  Outcome: Ongoing

## 2018-12-21 LAB
ANION GAP SERPL CALCULATED.3IONS-SCNC: 10 MMOL/L (ref 3–16)
ANISOCYTOSIS: ABNORMAL
BANDED NEUTROPHILS RELATIVE PERCENT: 1 % (ref 0–7)
BASOPHILS ABSOLUTE: 0 K/UL (ref 0–0.2)
BASOPHILS RELATIVE PERCENT: 0 %
BUN BLDV-MCNC: 58 MG/DL (ref 7–20)
CALCIUM SERPL-MCNC: 9.3 MG/DL (ref 8.3–10.6)
CHLORIDE BLD-SCNC: 108 MMOL/L (ref 99–110)
CO2: 31 MMOL/L (ref 21–32)
CREAT SERPL-MCNC: 1.9 MG/DL (ref 0.8–1.3)
EOSINOPHILS ABSOLUTE: 0.5 K/UL (ref 0–0.6)
EOSINOPHILS RELATIVE PERCENT: 3 %
GFR AFRICAN AMERICAN: 40
GFR NON-AFRICAN AMERICAN: 33
GLUCOSE BLD-MCNC: 133 MG/DL (ref 70–99)
GLUCOSE BLD-MCNC: 141 MG/DL (ref 70–99)
GLUCOSE BLD-MCNC: 153 MG/DL (ref 70–99)
GLUCOSE BLD-MCNC: 201 MG/DL (ref 70–99)
GLUCOSE BLD-MCNC: 288 MG/DL (ref 70–99)
GLUCOSE BLD-MCNC: 291 MG/DL (ref 70–99)
HCT VFR BLD CALC: 35.1 % (ref 40.5–52.5)
HEMOGLOBIN: 11.4 G/DL (ref 13.5–17.5)
LYMPHOCYTES ABSOLUTE: 1.1 K/UL (ref 1–5.1)
LYMPHOCYTES RELATIVE PERCENT: 7 %
MCH RBC QN AUTO: 30.7 PG (ref 26–34)
MCHC RBC AUTO-ENTMCNC: 32.6 G/DL (ref 31–36)
MCV RBC AUTO: 94.3 FL (ref 80–100)
MONOCYTES ABSOLUTE: 0.2 K/UL (ref 0–1.3)
MONOCYTES RELATIVE PERCENT: 1 %
NEUTROPHILS ABSOLUTE: 13.9 K/UL (ref 1.7–7.7)
NEUTROPHILS RELATIVE PERCENT: 88 %
PDW BLD-RTO: 15.8 % (ref 12.4–15.4)
PERFORMED ON: ABNORMAL
PLATELET # BLD: 308 K/UL (ref 135–450)
PLATELET SLIDE REVIEW: ADEQUATE
PMV BLD AUTO: 8.1 FL (ref 5–10.5)
POTASSIUM SERPL-SCNC: 3.2 MMOL/L (ref 3.5–5.1)
RBC # BLD: 3.73 M/UL (ref 4.2–5.9)
SLIDE REVIEW: ABNORMAL
SODIUM BLD-SCNC: 149 MMOL/L (ref 136–145)
TOXIC GRANULATION: PRESENT
WBC # BLD: 15.6 K/UL (ref 4–11)

## 2018-12-21 PROCEDURE — 85025 COMPLETE CBC W/AUTO DIFF WBC: CPT

## 2018-12-21 PROCEDURE — 99232 SBSQ HOSP IP/OBS MODERATE 35: CPT | Performed by: INTERNAL MEDICINE

## 2018-12-21 PROCEDURE — 97535 SELF CARE MNGMENT TRAINING: CPT

## 2018-12-21 PROCEDURE — 2580000003 HC RX 258: Performed by: INTERNAL MEDICINE

## 2018-12-21 PROCEDURE — 6370000000 HC RX 637 (ALT 250 FOR IP): Performed by: HOSPITALIST

## 2018-12-21 PROCEDURE — 6360000002 HC RX W HCPCS: Performed by: INTERNAL MEDICINE

## 2018-12-21 PROCEDURE — 80048 BASIC METABOLIC PNL TOTAL CA: CPT

## 2018-12-21 PROCEDURE — 6370000000 HC RX 637 (ALT 250 FOR IP): Performed by: PHYSICIAN ASSISTANT

## 2018-12-21 PROCEDURE — 1200000000 HC SEMI PRIVATE

## 2018-12-21 PROCEDURE — 97530 THERAPEUTIC ACTIVITIES: CPT

## 2018-12-21 PROCEDURE — 6360000002 HC RX W HCPCS: Performed by: HOSPITALIST

## 2018-12-21 PROCEDURE — 36415 COLL VENOUS BLD VENIPUNCTURE: CPT

## 2018-12-21 PROCEDURE — 2580000003 HC RX 258: Performed by: HOSPITALIST

## 2018-12-21 RX ADMIN — POTASSIUM CHLORIDE: 2 INJECTION, SOLUTION, CONCENTRATE INTRAVENOUS at 11:46

## 2018-12-21 RX ADMIN — CASTOR OIL AND BALSAM, PERU: 788; 87 OINTMENT TOPICAL at 09:40

## 2018-12-21 RX ADMIN — ENOXAPARIN SODIUM 30 MG: 100 INJECTION SUBCUTANEOUS at 09:32

## 2018-12-21 RX ADMIN — LEVOTHYROXINE SODIUM 75 MCG: 0.05 TABLET ORAL at 05:34

## 2018-12-21 RX ADMIN — INSULIN LISPRO 5 UNITS: 100 INJECTION, SOLUTION INTRAVENOUS; SUBCUTANEOUS at 20:10

## 2018-12-21 RX ADMIN — MEROPENEM 500 MG: 500 INJECTION, POWDER, FOR SOLUTION INTRAVENOUS at 09:28

## 2018-12-21 RX ADMIN — MEROPENEM 500 MG: 500 INJECTION, POWDER, FOR SOLUTION INTRAVENOUS at 00:57

## 2018-12-21 RX ADMIN — LINAGLIPTIN 5 MG: 5 TABLET, FILM COATED ORAL at 09:33

## 2018-12-21 RX ADMIN — INSULIN LISPRO 3 UNITS: 100 INJECTION, SOLUTION INTRAVENOUS; SUBCUTANEOUS at 11:46

## 2018-12-21 RX ADMIN — CASTOR OIL AND BALSAM, PERU: 788; 87 OINTMENT TOPICAL at 20:09

## 2018-12-21 RX ADMIN — POLYETHYLENE GLYCOL (3350) 17 G: 17 POWDER, FOR SOLUTION ORAL at 09:33

## 2018-12-21 RX ADMIN — SIMVASTATIN 10 MG: 10 TABLET, FILM COATED ORAL at 20:06

## 2018-12-21 RX ADMIN — INSULIN LISPRO 9 UNITS: 100 INJECTION, SOLUTION INTRAVENOUS; SUBCUTANEOUS at 16:25

## 2018-12-21 RX ADMIN — OXYBUTYNIN CHLORIDE 5 MG: 5 TABLET ORAL at 20:06

## 2018-12-21 RX ADMIN — ASPIRIN 81 MG 81 MG: 81 TABLET ORAL at 09:33

## 2018-12-21 RX ADMIN — FUROSEMIDE 40 MG: 10 INJECTION, SOLUTION INTRAMUSCULAR; INTRAVENOUS at 09:33

## 2018-12-21 RX ADMIN — OXYBUTYNIN CHLORIDE 5 MG: 5 TABLET ORAL at 09:33

## 2018-12-21 RX ADMIN — Medication 10 ML: at 20:06

## 2018-12-21 RX ADMIN — MEROPENEM 500 MG: 500 INJECTION, POWDER, FOR SOLUTION INTRAVENOUS at 16:25

## 2018-12-21 RX ADMIN — INSULIN GLARGINE 22 UNITS: 100 INJECTION, SOLUTION SUBCUTANEOUS at 20:11

## 2018-12-21 NOTE — CARE COORDINATION
INTERDISCIPLINARY PLAN OF CARE CONFERENCE    Date/Time: 12/21/2018 11:04 AM  Completed by: Collette Cisneros, Case Management      Patient Name:  Fredy Greco  YOB: 1924  Admitting Diagnosis: UTI (urinary tract infection) [N39.0]     Admit Date/Time:  12/16/2018  9:15 PM    Chart reviewed. Interdisciplinary team met to discuss patient progress and discharge plans. Disciplines included Case Management, Nursing, and Dietitian. Current Status:stable    PT/OT recommendation:return to LTC with PT    Anticipated Discharge Date: TBD  Expected D/C Disposition:  Nursing Home  Confirmed plan with patient and/or family Yes  Discharge Plan Comments: Reviewed chart. Plan continues return to [facility], possible d/c tomorrow,+eCOC. Writer spoke with Katie Ash from [facility] and she will speak with her pharmacist and have IM Invanz ordered. Writer spoke with  and he states for pt to receive Invanz here tomorrow and then can have IM Invanz for 5 days at [facility]. Following.            Home O2 in place on admit: to ECF  Pt informed of need to bring portable home O2 tank on day of discharge for nursing to connect prior to leaving:  Not Indicated  Verbalized agreement/Understanding:  Not Indicated

## 2018-12-21 NOTE — PROGRESS NOTES
ID Follow-up NOTE    CC: bacteremic UTI    Subjective:     Patient less confused confused. Denies headache, CP, back pain. Does not give much hx       Objective:     Patient Vitals for the past 24 hrs:   BP Temp Temp src Pulse Resp SpO2   18 0915 (!) 154/83 98 °F (36.7 °C) Oral 81 16 94 %   18 0208 (!) 159/78 97 °F (36.1 °C) Axillary 76 16 96 %   18 1910 (!) 142/71 97.1 °F (36.2 °C) Oral 85 16 96 %   18 1427 (!) 141/69 97.2 °F (36.2 °C) Oral 88 16 96 %     Temp (24hrs), Av.3 °F (36.3 °C), Min:97 °F (36.1 °C), Max:98 °F (36.7 °C)          EXAM:  General: alert, minimal fever; somewhat confused- unclear to me exactly what his baseline mental status is     LUNGS: Resp unlabored; clear to auscultation     CV: RR s M     ABD: soft, non-tender, without mass; suprapubic catheter in place. EXT: minimal ankle edema        Data Review:    Lab Results   Component Value Date    WBC 14.4 (H) 2018    HGB 10.5 (L) 2018    HCT 32.4 (L) 2018    MCV 94.7 2018     2018     Lab Results   Component Value Date    CREATININE 2.0 (H) 2018    BUN 64 (H) 2018     (H) 2018    K 3.3 (L) 2018     2018    CO2 28 2018     Lab Results   Component Value Date    ALT 20 2018    AST 17 2018    ALKPHOS 108 2018    BILITOT 1.1 (H) 2018         MICRO: E coli resistant to trim/sulfa and Cipro was cultured from both blood and urine                 Proteus cultured from urine. Repeat blood culture on  has remained negative. IMAGING: renal US: no evidence of obstruction    Assessment:     Active Problems:    UTI (urinary tract infection)    Bacteremia due to Escherichia coli    JAKE (acute kidney injury) ([address]  Resolved Problems:    * No resolved hospital problems. *  suprapubic catheter  bacteremic UTI due to E coli in patient with multiple antibiotic allergies. Proteus also cultured from urine.     MS zapata compared to its status on admission. Persistent leukocytosis and JAKE; WBC today is 15; creat is down slightly to 1.9  Renal US negative for obstruction  Patient is completing 4 days of carbapenem therapy today  Plan:   Continue meropenem 500 mg q8h for now. I would think the patient should have 10 days of carbapenem therapy (6 more days of either Invanz or meropenem). I discussed the case with the patient's nursing home physician, Dr. Shannon Aguilera. He will need a two day lead time to obtain meropenem or Invanz. If patient were discharged tomorrow, he could be given Invanz one gram tomorrow here, followed by 0.5 -1 gm daily at the [address] [place] [address] on his renal function to complete the 10 day course. However his ECF would have start the process of obtaining Invanz today. If process is not started today it might be Wednesday before the antibiotic would be obtained. By that time the patient would be very close to the end of his treatment.

## 2018-12-22 LAB
ANION GAP SERPL CALCULATED.3IONS-SCNC: 10 MMOL/L (ref 3–16)
ANISOCYTOSIS: ABNORMAL
BACTERIA: ABNORMAL /HPF
BASOPHILS ABSOLUTE: 0 K/UL (ref 0–0.2)
BASOPHILS RELATIVE PERCENT: 0 %
BILIRUBIN URINE: NEGATIVE
BLOOD, URINE: ABNORMAL
BUN BLDV-MCNC: 54 MG/DL (ref 7–20)
CALCIUM SERPL-MCNC: 8.6 MG/DL (ref 8.3–10.6)
CHLORIDE BLD-SCNC: 107 MMOL/L (ref 99–110)
CLARITY: ABNORMAL
CO2: 31 MMOL/L (ref 21–32)
COLOR: ABNORMAL
CREAT SERPL-MCNC: 1.7 MG/DL (ref 0.8–1.3)
CULTURE, BLOOD 2: NORMAL
EOSINOPHILS ABSOLUTE: 0.6 K/UL (ref 0–0.6)
EOSINOPHILS RELATIVE PERCENT: 4 %
GFR AFRICAN AMERICAN: 46
GFR NON-AFRICAN AMERICAN: 38
GLUCOSE BLD-MCNC: 180 MG/DL (ref 70–99)
GLUCOSE BLD-MCNC: 193 MG/DL (ref 70–99)
GLUCOSE BLD-MCNC: 211 MG/DL (ref 70–99)
GLUCOSE BLD-MCNC: 218 MG/DL (ref 70–99)
GLUCOSE BLD-MCNC: 235 MG/DL (ref 70–99)
GLUCOSE BLD-MCNC: 239 MG/DL (ref 70–99)
GLUCOSE URINE: 100 MG/DL
HCT VFR BLD CALC: 31.8 % (ref 40.5–52.5)
HEMOGLOBIN: 10.2 G/DL (ref 13.5–17.5)
HYPOCHROMIA: ABNORMAL
KETONES, URINE: NEGATIVE MG/DL
LEUKOCYTE ESTERASE, URINE: ABNORMAL
LYMPHOCYTES ABSOLUTE: 1 K/UL (ref 1–5.1)
LYMPHOCYTES RELATIVE PERCENT: 6 %
MCH RBC QN AUTO: 30.5 PG (ref 26–34)
MCHC RBC AUTO-ENTMCNC: 32.2 G/DL (ref 31–36)
MCV RBC AUTO: 94.6 FL (ref 80–100)
MICROSCOPIC EXAMINATION: YES
MONOCYTES ABSOLUTE: 1 K/UL (ref 0–1.3)
MONOCYTES RELATIVE PERCENT: 6 %
NEUTROPHILS ABSOLUTE: 13.4 K/UL (ref 1.7–7.7)
NEUTROPHILS RELATIVE PERCENT: 84 %
NITRITE, URINE: NEGATIVE
PDW BLD-RTO: 15.8 % (ref 12.4–15.4)
PERFORMED ON: ABNORMAL
PH UA: 6
PLATELET # BLD: 297 K/UL (ref 135–450)
PLATELET SLIDE REVIEW: ADEQUATE
PMV BLD AUTO: 8.4 FL (ref 5–10.5)
POTASSIUM SERPL-SCNC: 3.3 MMOL/L (ref 3.5–5.1)
PROTEIN UA: ABNORMAL MG/DL
RBC # BLD: 3.36 M/UL (ref 4.2–5.9)
RBC UA: ABNORMAL /HPF (ref 0–2)
SLIDE REVIEW: ABNORMAL
SODIUM BLD-SCNC: 148 MMOL/L (ref 136–145)
SPECIFIC GRAVITY UA: 1.01
UROBILINOGEN, URINE: 0.2 E.U./DL
WBC # BLD: 15.9 K/UL (ref 4–11)
WBC UA: ABNORMAL /HPF (ref 0–5)

## 2018-12-22 PROCEDURE — 99232 SBSQ HOSP IP/OBS MODERATE 35: CPT | Performed by: INTERNAL MEDICINE

## 2018-12-22 PROCEDURE — 1200000000 HC SEMI PRIVATE

## 2018-12-22 PROCEDURE — 6360000002 HC RX W HCPCS: Performed by: INTERNAL MEDICINE

## 2018-12-22 PROCEDURE — 51705 CHANGE OF BLADDER TUBE: CPT

## 2018-12-22 PROCEDURE — 84156 ASSAY OF PROTEIN URINE: CPT

## 2018-12-22 PROCEDURE — 81001 URINALYSIS AUTO W/SCOPE: CPT

## 2018-12-22 PROCEDURE — 83935 ASSAY OF URINE OSMOLALITY: CPT

## 2018-12-22 PROCEDURE — 6370000000 HC RX 637 (ALT 250 FOR IP): Performed by: PHYSICIAN ASSISTANT

## 2018-12-22 PROCEDURE — 36415 COLL VENOUS BLD VENIPUNCTURE: CPT

## 2018-12-22 PROCEDURE — 82570 ASSAY OF URINE CREATININE: CPT

## 2018-12-22 PROCEDURE — 6370000000 HC RX 637 (ALT 250 FOR IP): Performed by: HOSPITALIST

## 2018-12-22 PROCEDURE — 80048 BASIC METABOLIC PNL TOTAL CA: CPT

## 2018-12-22 PROCEDURE — 84300 ASSAY OF URINE SODIUM: CPT

## 2018-12-22 PROCEDURE — 2580000003 HC RX 258: Performed by: INTERNAL MEDICINE

## 2018-12-22 PROCEDURE — 85025 COMPLETE CBC W/AUTO DIFF WBC: CPT

## 2018-12-22 PROCEDURE — 6360000002 HC RX W HCPCS: Performed by: HOSPITALIST

## 2018-12-22 RX ADMIN — POTASSIUM BICARBONATE 20 MEQ: 782 TABLET, EFFERVESCENT ORAL at 10:44

## 2018-12-22 RX ADMIN — ASPIRIN 81 MG 81 MG: 81 TABLET ORAL at 08:57

## 2018-12-22 RX ADMIN — POLYETHYLENE GLYCOL (3350) 17 G: 17 POWDER, FOR SOLUTION ORAL at 08:57

## 2018-12-22 RX ADMIN — CASTOR OIL AND BALSAM, PERU: 788; 87 OINTMENT TOPICAL at 21:02

## 2018-12-22 RX ADMIN — OXYBUTYNIN CHLORIDE 5 MG: 5 TABLET ORAL at 08:57

## 2018-12-22 RX ADMIN — MEROPENEM 500 MG: 500 INJECTION, POWDER, FOR SOLUTION INTRAVENOUS at 08:53

## 2018-12-22 RX ADMIN — MEROPENEM 500 MG: 500 INJECTION, POWDER, FOR SOLUTION INTRAVENOUS at 16:34

## 2018-12-22 RX ADMIN — INSULIN LISPRO 3 UNITS: 100 INJECTION, SOLUTION INTRAVENOUS; SUBCUTANEOUS at 21:14

## 2018-12-22 RX ADMIN — OXYBUTYNIN CHLORIDE 5 MG: 5 TABLET ORAL at 21:01

## 2018-12-22 RX ADMIN — CASTOR OIL AND BALSAM, PERU: 788; 87 OINTMENT TOPICAL at 08:59

## 2018-12-22 RX ADMIN — POTASSIUM CHLORIDE: 2 INJECTION, SOLUTION, CONCENTRATE INTRAVENOUS at 09:09

## 2018-12-22 RX ADMIN — INSULIN GLARGINE 22 UNITS: 100 INJECTION, SOLUTION SUBCUTANEOUS at 21:14

## 2018-12-22 RX ADMIN — SIMVASTATIN 10 MG: 10 TABLET, FILM COATED ORAL at 21:01

## 2018-12-22 RX ADMIN — INSULIN LISPRO 6 UNITS: 100 INJECTION, SOLUTION INTRAVENOUS; SUBCUTANEOUS at 11:50

## 2018-12-22 RX ADMIN — INSULIN LISPRO 3 UNITS: 100 INJECTION, SOLUTION INTRAVENOUS; SUBCUTANEOUS at 08:59

## 2018-12-22 RX ADMIN — ENOXAPARIN SODIUM 30 MG: 100 INJECTION SUBCUTANEOUS at 08:57

## 2018-12-22 RX ADMIN — LINAGLIPTIN 5 MG: 5 TABLET, FILM COATED ORAL at 08:57

## 2018-12-22 RX ADMIN — MEROPENEM 500 MG: 500 INJECTION, POWDER, FOR SOLUTION INTRAVENOUS at 01:22

## 2018-12-22 RX ADMIN — LEVOTHYROXINE SODIUM 75 MCG: 0.05 TABLET ORAL at 05:59

## 2018-12-22 RX ADMIN — POTASSIUM CHLORIDE: 2 INJECTION, SOLUTION, CONCENTRATE INTRAVENOUS at 21:14

## 2018-12-22 RX ADMIN — INSULIN LISPRO 6 UNITS: 100 INJECTION, SOLUTION INTRAVENOUS; SUBCUTANEOUS at 16:52

## 2018-12-22 ASSESSMENT — PAIN SCALES - GENERAL: PAINLEVEL_OUTOF10: 0

## 2018-12-22 NOTE — PROGRESS NOTES
ciprofloxacin Resistant >2 mcg/mL   gentamicin Sensitive <=4 mcg/mL   meropenem Sensitive <=1 mcg/mL   nitrofurantoin Sensitive <=32 mcg/mL   piperacillin-tazobactam Sensitive <=16 mcg/mL   trimethoprim-sulfamethoxazole Resistant >2/38 mcg/mL         PROTEUS MIRABILIS     Antibiotic Interpretation GAGANDEEP Unit   ampicillin Sensitive <=8 mcg/mL   cefTRIAXone Sensitive <=1 mcg/mL   cefepime Sensitive <=2 mcg/mL   cefotaxime Sensitive <=2 mcg/mL   cefuroxime Sensitive <=4 mcg/mL   ciprofloxacin Sensitive <=1 mcg/mL   gentamicin Sensitive <=4 mcg/mL   meropenem Sensitive <=1 mcg/mL   nitrofurantoin Resistant >64 mcg/mL   piperacillin-tazobactam Sensitive <=16 mcg/mL   trimethoprim-sulfamethoxazole Sensitive <=2/38 mcg/mL        Blood cx #1 12/16   ESCHERICHIA COLI     Antibiotic Interpretation GAGANDEEP Unit   ampicillin Sensitive <=8 mcg/mL   ceFAZolin Sensitive <=2 mcg/mL   cefTRIAXone Sensitive <=1 mcg/mL   cefepime Sensitive <=2 mcg/mL   cefotaxime Sensitive <=2 mcg/mL   cefuroxime Sensitive <=4 mcg/mL   ciprofloxacin Resistant >2 mcg/mL   gentamicin Sensitive <=4 mcg/mL   meropenem Sensitive <=1 mcg/mL   piperacillin-tazobactam Sensitive <=16 mcg/mL   trimethoprim-sulfamethoxazole Resistant >2/38 mcg/mL     Blood cx #2 -12/17 NGTD      Radiology  US RENAL COMPLETE   Final Result   No evidence hydronephrosis. Assessment/Plan:    UTI due to suprapubic catheter-  E Coli and Proteus   E. Coli Bacteremia  Hx of ESBL  - consulted ID - agree with Kevin D#6  - D/w Dr. Eugenio Howell. See his note from 12/21/18 for dc planning with IV antibiotics at [facility]. Will plan on discharging bach to [facility] with Invanz to complete a 10 day course. CM informed pharmacist at [facility] of need for invanz on 12/21   - suprapubic catheter changed     JAKE   Hypernatremia   - likely pre renal.  Crt 1.8->2->1.7  - initially held patient's lasix and started IV NS. Then stopped IV NS and started IV lasix with good OP.   - now with

## 2018-12-22 NOTE — PROGRESS NOTES
55*       Assessment/     Acute kidney injury  - Etiology: Pre-renal/ATN injury in setting of polymicrobial UTI, diuresis  - Data: Serum creatinine 1.8 on admission, previously 0.5 from Sep 2018  UA with large blood, 100 protein, s.g. 1.010, 10-20 wbc's, 20-50 rbc's,   - Clinical: Serum creatinine varying between 1.6-2.0 since admission despite IVF's, non-oliguric     Hypernatrema  - Secondary to reduced water intake, lasix administration  - Lasix on hold, urine volume remains high      Hypokalemia  - Secondary to diuresis     UTI  - Plans per ID    Plan/     Recheck UA with indices  Follow labs  D5W with KCl  U osm  Likely conservative management given age is best  -----------------------------  Edward Vargas M.D.   [facility]

## 2018-12-23 VITALS
TEMPERATURE: 97.1 F | BODY MASS INDEX: 22.4 KG/M2 | HEIGHT: 71 IN | WEIGHT: 160 LBS | OXYGEN SATURATION: 97 % | RESPIRATION RATE: 16 BRPM | DIASTOLIC BLOOD PRESSURE: 74 MMHG | HEART RATE: 88 BPM | SYSTOLIC BLOOD PRESSURE: 151 MMHG

## 2018-12-23 PROBLEM — N39.0 UTI (URINARY TRACT INFECTION): Status: RESOLVED | Noted: 2018-12-16 | Resolved: 2018-12-23

## 2018-12-23 LAB
ALBUMIN SERPL-MCNC: 2.2 G/DL (ref 3.4–5)
ANION GAP SERPL CALCULATED.3IONS-SCNC: 9 MMOL/L (ref 3–16)
BASOPHILS ABSOLUTE: 0.1 K/UL (ref 0–0.2)
BASOPHILS RELATIVE PERCENT: 0.4 %
BUN BLDV-MCNC: 42 MG/DL (ref 7–20)
CALCIUM SERPL-MCNC: 8.3 MG/DL (ref 8.3–10.6)
CHLORIDE BLD-SCNC: 103 MMOL/L (ref 99–110)
CO2: 30 MMOL/L (ref 21–32)
CREAT SERPL-MCNC: 1.6 MG/DL (ref 0.8–1.3)
CREATININE URINE: 33.3 MG/DL (ref 39–259)
EOSINOPHILS ABSOLUTE: 0.5 K/UL (ref 0–0.6)
EOSINOPHILS RELATIVE PERCENT: 2.8 %
GFR AFRICAN AMERICAN: 49
GFR NON-AFRICAN AMERICAN: 40
GLUCOSE BLD-MCNC: 103 MG/DL (ref 70–99)
GLUCOSE BLD-MCNC: 125 MG/DL (ref 70–99)
GLUCOSE BLD-MCNC: 126 MG/DL (ref 70–99)
GLUCOSE BLD-MCNC: 155 MG/DL (ref 70–99)
HCT VFR BLD CALC: 35 % (ref 40.5–52.5)
HEMOGLOBIN: 11.3 G/DL (ref 13.5–17.5)
LYMPHOCYTES ABSOLUTE: 1.3 K/UL (ref 1–5.1)
LYMPHOCYTES RELATIVE PERCENT: 8.1 %
MCH RBC QN AUTO: 30.8 PG (ref 26–34)
MCHC RBC AUTO-ENTMCNC: 32.4 G/DL (ref 31–36)
MCV RBC AUTO: 95 FL (ref 80–100)
MONOCYTES ABSOLUTE: 1.2 K/UL (ref 0–1.3)
MONOCYTES RELATIVE PERCENT: 7.3 %
NEUTROPHILS ABSOLUTE: 13 K/UL (ref 1.7–7.7)
NEUTROPHILS RELATIVE PERCENT: 81.4 %
OSMOLALITY URINE: 332 MOSM/KG (ref 390–1070)
PDW BLD-RTO: 15.6 % (ref 12.4–15.4)
PERFORMED ON: ABNORMAL
PHOSPHORUS: 3.2 MG/DL (ref 2.5–4.9)
PLATELET # BLD: 348 K/UL (ref 135–450)
PMV BLD AUTO: 8.4 FL (ref 5–10.5)
POTASSIUM SERPL-SCNC: 3.9 MMOL/L (ref 3.5–5.1)
PROTEIN PROTEIN: 34 MG/DL
RBC # BLD: 3.68 M/UL (ref 4.2–5.9)
SODIUM BLD-SCNC: 142 MMOL/L (ref 136–145)
SODIUM URINE: 38 MMOL/L
TOTAL CK: 45 U/L (ref 39–308)
URIC ACID, SERUM: 5.7 MG/DL (ref 3.5–7.2)
WBC # BLD: 16 K/UL (ref 4–11)

## 2018-12-23 PROCEDURE — 82550 ASSAY OF CK (CPK): CPT

## 2018-12-23 PROCEDURE — 2580000003 HC RX 258: Performed by: HOSPITALIST

## 2018-12-23 PROCEDURE — 2580000003 HC RX 258: Performed by: INTERNAL MEDICINE

## 2018-12-23 PROCEDURE — 36415 COLL VENOUS BLD VENIPUNCTURE: CPT

## 2018-12-23 PROCEDURE — 6360000002 HC RX W HCPCS: Performed by: HOSPITALIST

## 2018-12-23 PROCEDURE — 99239 HOSP IP/OBS DSCHRG MGMT >30: CPT | Performed by: INTERNAL MEDICINE

## 2018-12-23 PROCEDURE — 6360000002 HC RX W HCPCS: Performed by: INTERNAL MEDICINE

## 2018-12-23 PROCEDURE — 6370000000 HC RX 637 (ALT 250 FOR IP): Performed by: PHYSICIAN ASSISTANT

## 2018-12-23 PROCEDURE — 85025 COMPLETE CBC W/AUTO DIFF WBC: CPT

## 2018-12-23 PROCEDURE — 6370000000 HC RX 637 (ALT 250 FOR IP): Performed by: HOSPITALIST

## 2018-12-23 PROCEDURE — 80069 RENAL FUNCTION PANEL: CPT

## 2018-12-23 PROCEDURE — 84550 ASSAY OF BLOOD/URIC ACID: CPT

## 2018-12-23 RX ORDER — ERTAPENEM 1 G/1
500 INJECTION, POWDER, LYOPHILIZED, FOR SOLUTION INTRAMUSCULAR; INTRAVENOUS EVERY 24 HOURS
Qty: 1500 MG | Refills: 0
Start: 2018-12-24 | End: 2018-12-27

## 2018-12-23 RX ADMIN — POLYETHYLENE GLYCOL (3350) 17 G: 17 POWDER, FOR SOLUTION ORAL at 08:39

## 2018-12-23 RX ADMIN — POTASSIUM CHLORIDE: 2 INJECTION, SOLUTION, CONCENTRATE INTRAVENOUS at 05:59

## 2018-12-23 RX ADMIN — Medication 10 ML: at 08:39

## 2018-12-23 RX ADMIN — POTASSIUM BICARBONATE 20 MEQ: 782 TABLET, EFFERVESCENT ORAL at 08:39

## 2018-12-23 RX ADMIN — INSULIN LISPRO 3 UNITS: 100 INJECTION, SOLUTION INTRAVENOUS; SUBCUTANEOUS at 11:34

## 2018-12-23 RX ADMIN — OXYBUTYNIN CHLORIDE 5 MG: 5 TABLET ORAL at 08:39

## 2018-12-23 RX ADMIN — MEROPENEM 500 MG: 500 INJECTION, POWDER, FOR SOLUTION INTRAVENOUS at 01:12

## 2018-12-23 RX ADMIN — MEROPENEM 500 MG: 500 INJECTION, POWDER, FOR SOLUTION INTRAVENOUS at 08:01

## 2018-12-23 RX ADMIN — ENOXAPARIN SODIUM 30 MG: 100 INJECTION SUBCUTANEOUS at 08:39

## 2018-12-23 RX ADMIN — ASPIRIN 81 MG 81 MG: 81 TABLET ORAL at 08:39

## 2018-12-23 RX ADMIN — LINAGLIPTIN 5 MG: 5 TABLET, FILM COATED ORAL at 08:39

## 2018-12-23 RX ADMIN — CASTOR OIL AND BALSAM, PERU: 788; 87 OINTMENT TOPICAL at 08:40

## 2018-12-23 RX ADMIN — LEVOTHYROXINE SODIUM 75 MCG: 0.05 TABLET ORAL at 05:59

## 2018-12-23 ASSESSMENT — PAIN SCALES - GENERAL: PAINLEVEL_OUTOF10: 0

## 2018-12-23 NOTE — DISCHARGE INSTR - COC
Continuity of Care Form    Patient Name: Lindajo Heimlich   :  1924  MRN:  [id number]    Admit date:  2018  Discharge date:  18    Code Status Order: DNR-CCA   Advance Directives:   Advance Care Flowsheet Documentation     Date/Time Healthcare Directive Type of Healthcare Directive Copy in [address] Agent's Name Healthcare Agent's Phone Number    [phone]  Yes, patient has an advance directive for healthcare treatment  Durable power of  for health care  Yes, copy in chart  [address] [place] [address]  [phone]          Admitting Physician:  Angelo Martins MD  PCP: Billy Jolley MD    Discharging Nurse: [facility] Unit/Room#: 0225/0225-01  Discharging Unit Phone Number: [phone]    Emergency Contact:   Extended Emergency Contact Information  Primary Emergency Contact: Georgina Craig  Address: [address] [place] [address] Phone: [phone]  Relation: Spouse  Secondary Emergency Contact: Jona Craig  Address: [facility] [place] [employer] DAUGHTER           [id number]   [address] Phone: [phone]  Relation: Child    Past Surgical History:  Past Surgical History:   Procedure Laterality Date    NV INCISE/DRAIN BLADDER  Cystoscopy, placement suprapubic catheter    Cystostomy, Suprapubic    PROSTATECTOMY      TONSILLECTOMY         Immunization History:   Immunization History   Administered Date(s) Administered    Influenza Virus Vaccine 11/10/2015    Pneumococcal Polysaccharide ([id number]) 2018       Active Problems:  Patient Active Problem List   Diagnosis Code    Cerebral infarction ([address] I63.9    HTN (hypertension) I10    DM (diabetes mellitus) type II controlled, neurological manifestation ([address] E11.49    UTI (urinary tract infection) due to urinary indwelling catheter ([address] T83.511A, N39.0    Anemia D64.9    Sepsis ([address] A41.9    History of

## 2018-12-23 NOTE — DISCHARGE SUMMARY
risperiDONE 0.25 MG tablet  Commonly known as:  RISPERDAL     simvastatin 10 MG tablet  Commonly known as:  ZOCOR     vitamin B-12 1000 MCG tablet  Commonly known as:  CYANOCOBALAMIN        STOP taking these medications    lisinopril 10 MG tablet  Commonly known as:  PRINIVIL;ZESTRIL           Where to Get Your Medications      Information about where to get these medications is not yet available    Ask your nurse or doctor about these medications  · ertapenem 1 GM injection           Discharged in stable condition to [address] [place] [address]     Follow Up:   Follow up with PCP in 1 week     Jessie Borja MD 1/17/2019 7:38 AM

## 2018-12-23 NOTE — PLAN OF CARE
Problem: Falls - Risk of:  Goal: Will remain free from falls  Will remain free from falls   Outcome: Ongoing      Problem: Risk for Impaired Skin Integrity  Goal: Tissue integrity - skin and mucous membranes  Structural intactness and normal physiological function of skin and  mucous membranes.    Outcome: Ongoing      Problem: Infection:  Goal: Will remain free from infection  Will remain free from infection   Outcome: Ongoing      Problem: Urinary Elimination:  Goal: Signs and symptoms of infection will decrease  Signs and symptoms of infection will decrease   Outcome: Ongoing      Problem: Discharge Planning:  Goal: Discharged to appropriate level of care  Discharged to appropriate level of care   Outcome: Ongoing

## 2018-12-23 NOTE — PROGRESS NOTES
Report called to Yoselin Gastelum at [facility]. Pt scheduled for pickup at 1300 today.

## 2018-12-23 NOTE — PROGRESS NOTES
PM assessment completed. See flow sheet. Pt A&O X 4. Resp E&E with no distress noted Rt arm with noted edema, IV infusing without  Difficulty. Pt.denies any needs at this time. Call light within reach. Will continue to monitor. Bed alrm on for pts safety.

## 2019-03-12 ENCOUNTER — HOSPITAL ENCOUNTER (INPATIENT)
Age: 84
LOS: 6 days | Discharge: NURSING, REHAB OR LONG-TERM CARE | DRG: 871 | End: 2019-03-18
Attending: EMERGENCY MEDICINE | Admitting: INTERNAL MEDICINE
Payer: MEDICARE

## 2019-03-12 ENCOUNTER — APPOINTMENT (OUTPATIENT)
Dept: GENERAL RADIOLOGY | Age: 84
DRG: 871 | End: 2019-03-12
Payer: MEDICARE

## 2019-03-12 DIAGNOSIS — J96.01 ACUTE RESPIRATORY FAILURE WITH HYPOXIA (HCC): Primary | ICD-10-CM

## 2019-03-12 DIAGNOSIS — A41.9 SEPTICEMIA (HCC): ICD-10-CM

## 2019-03-12 PROBLEM — J96.00 ACUTE RESPIRATORY FAILURE (HCC): Status: ACTIVE | Noted: 2019-03-12

## 2019-03-12 LAB
A/G RATIO: 1 (ref 1.1–2.2)
ALBUMIN SERPL-MCNC: 3.9 G/DL (ref 3.4–5)
ALP BLD-CCNC: 131 U/L (ref 40–129)
ALT SERPL-CCNC: 40 U/L (ref 10–40)
ANION GAP SERPL CALCULATED.3IONS-SCNC: 13 MMOL/L (ref 3–16)
AST SERPL-CCNC: 43 U/L (ref 15–37)
BACTERIA: ABNORMAL /HPF
BASOPHILS ABSOLUTE: 0.1 K/UL (ref 0–0.2)
BASOPHILS RELATIVE PERCENT: 0.5 %
BILIRUB SERPL-MCNC: 0.7 MG/DL (ref 0–1)
BILIRUBIN URINE: NEGATIVE
BLOOD, URINE: ABNORMAL
BUN BLDV-MCNC: 29 MG/DL (ref 7–20)
CALCIUM SERPL-MCNC: 10 MG/DL (ref 8.3–10.6)
CHLORIDE BLD-SCNC: 99 MMOL/L (ref 99–110)
CLARITY: CLEAR
CO2: 27 MMOL/L (ref 21–32)
COLOR: YELLOW
CREAT SERPL-MCNC: 1.2 MG/DL (ref 0.8–1.3)
EKG ATRIAL RATE: 117 BPM
EKG DIAGNOSIS: NORMAL
EKG P AXIS: 36 DEGREES
EKG P-R INTERVAL: 182 MS
EKG Q-T INTERVAL: 304 MS
EKG QRS DURATION: 84 MS
EKG QTC CALCULATION (BAZETT): 424 MS
EKG R AXIS: 27 DEGREES
EKG T AXIS: 33 DEGREES
EKG VENTRICULAR RATE: 117 BPM
EOSINOPHILS ABSOLUTE: 0 K/UL (ref 0–0.6)
EOSINOPHILS RELATIVE PERCENT: 0.1 %
EPITHELIAL CELLS, UA: ABNORMAL /HPF
GFR AFRICAN AMERICAN: >60
GFR NON-AFRICAN AMERICAN: 56
GLOBULIN: 4.1 G/DL
GLUCOSE BLD-MCNC: 236 MG/DL (ref 70–99)
GLUCOSE BLD-MCNC: 270 MG/DL (ref 70–99)
GLUCOSE BLD-MCNC: 276 MG/DL (ref 70–99)
GLUCOSE URINE: 250 MG/DL
HCT VFR BLD CALC: 36.8 % (ref 40.5–52.5)
HEMOGLOBIN: 11.8 G/DL (ref 13.5–17.5)
KETONES, URINE: NEGATIVE MG/DL
LACTIC ACID: 2.3 MMOL/L (ref 0.4–2)
LACTIC ACID: 2.5 MMOL/L (ref 0.4–2)
LEUKOCYTE ESTERASE, URINE: ABNORMAL
LYMPHOCYTES ABSOLUTE: 0.5 K/UL (ref 1–5.1)
LYMPHOCYTES RELATIVE PERCENT: 3.4 %
MCH RBC QN AUTO: 29 PG (ref 26–34)
MCHC RBC AUTO-ENTMCNC: 32.1 G/DL (ref 31–36)
MCV RBC AUTO: 90.4 FL (ref 80–100)
MICROSCOPIC EXAMINATION: YES
MONOCYTES ABSOLUTE: 1.2 K/UL (ref 0–1.3)
MONOCYTES RELATIVE PERCENT: 9 %
NEUTROPHILS ABSOLUTE: 11.6 K/UL (ref 1.7–7.7)
NEUTROPHILS RELATIVE PERCENT: 87 %
NITRITE, URINE: NEGATIVE
PDW BLD-RTO: 14.7 % (ref 12.4–15.4)
PERFORMED ON: ABNORMAL
PERFORMED ON: ABNORMAL
PH UA: 8 (ref 5–8)
PLATELET # BLD: 268 K/UL (ref 135–450)
PMV BLD AUTO: 7.2 FL (ref 5–10.5)
POTASSIUM SERPL-SCNC: 4 MMOL/L (ref 3.5–5.1)
PRO-BNP: 1845 PG/ML (ref 0–449)
PROTEIN UA: 30 MG/DL
RAPID INFLUENZA  B AGN: NEGATIVE
RAPID INFLUENZA A AGN: NEGATIVE
RBC # BLD: 4.07 M/UL (ref 4.2–5.9)
RBC UA: ABNORMAL /HPF (ref 0–2)
SODIUM BLD-SCNC: 139 MMOL/L (ref 136–145)
SPECIFIC GRAVITY UA: 1.01 (ref 1–1.03)
TOTAL PROTEIN: 8 G/DL (ref 6.4–8.2)
TROPONIN: 0.06 NG/ML
URINE REFLEX TO CULTURE: YES
URINE TYPE: ABNORMAL
UROBILINOGEN, URINE: 0.2 E.U./DL
WBC # BLD: 13.3 K/UL (ref 4–11)
WBC UA: ABNORMAL /HPF (ref 0–5)

## 2019-03-12 PROCEDURE — 94640 AIRWAY INHALATION TREATMENT: CPT

## 2019-03-12 PROCEDURE — 83880 ASSAY OF NATRIURETIC PEPTIDE: CPT

## 2019-03-12 PROCEDURE — 2580000003 HC RX 258: Performed by: EMERGENCY MEDICINE

## 2019-03-12 PROCEDURE — 2580000003 HC RX 258: Performed by: PHYSICIAN ASSISTANT

## 2019-03-12 PROCEDURE — 93010 ELECTROCARDIOGRAM REPORT: CPT | Performed by: INTERNAL MEDICINE

## 2019-03-12 PROCEDURE — 2700000000 HC OXYGEN THERAPY PER DAY

## 2019-03-12 PROCEDURE — 96367 TX/PROPH/DG ADDL SEQ IV INF: CPT

## 2019-03-12 PROCEDURE — 6370000000 HC RX 637 (ALT 250 FOR IP): Performed by: PHYSICIAN ASSISTANT

## 2019-03-12 PROCEDURE — 87077 CULTURE AEROBIC IDENTIFY: CPT

## 2019-03-12 PROCEDURE — 1200000000 HC SEMI PRIVATE

## 2019-03-12 PROCEDURE — 6360000002 HC RX W HCPCS: Performed by: EMERGENCY MEDICINE

## 2019-03-12 PROCEDURE — 87804 INFLUENZA ASSAY W/OPTIC: CPT

## 2019-03-12 PROCEDURE — 87040 BLOOD CULTURE FOR BACTERIA: CPT

## 2019-03-12 PROCEDURE — 80053 COMPREHEN METABOLIC PANEL: CPT

## 2019-03-12 PROCEDURE — 6370000000 HC RX 637 (ALT 250 FOR IP): Performed by: EMERGENCY MEDICINE

## 2019-03-12 PROCEDURE — 81001 URINALYSIS AUTO W/SCOPE: CPT

## 2019-03-12 PROCEDURE — 99291 CRITICAL CARE FIRST HOUR: CPT

## 2019-03-12 PROCEDURE — 96365 THER/PROPH/DIAG IV INF INIT: CPT

## 2019-03-12 PROCEDURE — 85025 COMPLETE CBC W/AUTO DIFF WBC: CPT

## 2019-03-12 PROCEDURE — 36415 COLL VENOUS BLD VENIPUNCTURE: CPT

## 2019-03-12 PROCEDURE — 83605 ASSAY OF LACTIC ACID: CPT

## 2019-03-12 PROCEDURE — 94150 VITAL CAPACITY TEST: CPT

## 2019-03-12 PROCEDURE — 71045 X-RAY EXAM CHEST 1 VIEW: CPT

## 2019-03-12 PROCEDURE — 96366 THER/PROPH/DIAG IV INF ADDON: CPT

## 2019-03-12 PROCEDURE — 94761 N-INVAS EAR/PLS OXIMETRY MLT: CPT

## 2019-03-12 PROCEDURE — 84484 ASSAY OF TROPONIN QUANT: CPT

## 2019-03-12 PROCEDURE — 87186 SC STD MICRODIL/AGAR DIL: CPT

## 2019-03-12 PROCEDURE — 93005 ELECTROCARDIOGRAM TRACING: CPT | Performed by: EMERGENCY MEDICINE

## 2019-03-12 PROCEDURE — 96375 TX/PRO/DX INJ NEW DRUG ADDON: CPT

## 2019-03-12 PROCEDURE — 6370000000 HC RX 637 (ALT 250 FOR IP): Performed by: HOSPITALIST

## 2019-03-12 PROCEDURE — 87086 URINE CULTURE/COLONY COUNT: CPT

## 2019-03-12 RX ORDER — OSELTAMIVIR PHOSPHATE 6 MG/ML
30 FOR SUSPENSION ORAL ONCE
Status: COMPLETED | OUTPATIENT
Start: 2019-03-12 | End: 2019-03-12

## 2019-03-12 RX ORDER — IPRATROPIUM BROMIDE AND ALBUTEROL SULFATE 2.5; .5 MG/3ML; MG/3ML
SOLUTION RESPIRATORY (INHALATION)
Status: DISCONTINUED
Start: 2019-03-12 | End: 2019-03-13

## 2019-03-12 RX ORDER — ONDANSETRON 2 MG/ML
4 INJECTION INTRAMUSCULAR; INTRAVENOUS EVERY 6 HOURS PRN
Status: DISCONTINUED | OUTPATIENT
Start: 2019-03-12 | End: 2019-03-18 | Stop reason: HOSPADM

## 2019-03-12 RX ORDER — METHYLPREDNISOLONE SODIUM SUCCINATE 125 MG/2ML
125 INJECTION, POWDER, LYOPHILIZED, FOR SOLUTION INTRAMUSCULAR; INTRAVENOUS ONCE
Status: COMPLETED | OUTPATIENT
Start: 2019-03-12 | End: 2019-03-12

## 2019-03-12 RX ORDER — INSULIN GLARGINE 100 [IU]/ML
22 INJECTION, SOLUTION SUBCUTANEOUS NIGHTLY
Status: DISCONTINUED | OUTPATIENT
Start: 2019-03-12 | End: 2019-03-14

## 2019-03-12 RX ORDER — ASPIRIN 81 MG/1
81 TABLET, CHEWABLE ORAL DAILY
Status: DISCONTINUED | OUTPATIENT
Start: 2019-03-12 | End: 2019-03-18 | Stop reason: HOSPADM

## 2019-03-12 RX ORDER — 0.9 % SODIUM CHLORIDE 0.9 %
30 INTRAVENOUS SOLUTION INTRAVENOUS ONCE
Status: COMPLETED | OUTPATIENT
Start: 2019-03-12 | End: 2019-03-12

## 2019-03-12 RX ORDER — ACETAMINOPHEN 325 MG/1
650 TABLET ORAL EVERY 4 HOURS PRN
Status: DISCONTINUED | OUTPATIENT
Start: 2019-03-12 | End: 2019-03-18 | Stop reason: HOSPADM

## 2019-03-12 RX ORDER — SODIUM CHLORIDE 0.9 % (FLUSH) 0.9 %
10 SYRINGE (ML) INJECTION PRN
Status: DISCONTINUED | OUTPATIENT
Start: 2019-03-12 | End: 2019-03-16 | Stop reason: SDUPTHER

## 2019-03-12 RX ORDER — NICOTINE POLACRILEX 4 MG
15 LOZENGE BUCCAL PRN
Status: DISCONTINUED | OUTPATIENT
Start: 2019-03-12 | End: 2019-03-18 | Stop reason: HOSPADM

## 2019-03-12 RX ORDER — SODIUM CHLORIDE 9 MG/ML
INJECTION, SOLUTION INTRAVENOUS CONTINUOUS
Status: DISCONTINUED | OUTPATIENT
Start: 2019-03-12 | End: 2019-03-14

## 2019-03-12 RX ORDER — DEXTROSE MONOHYDRATE 25 G/50ML
12.5 INJECTION, SOLUTION INTRAVENOUS PRN
Status: DISCONTINUED | OUTPATIENT
Start: 2019-03-12 | End: 2019-03-18 | Stop reason: HOSPADM

## 2019-03-12 RX ORDER — OXYBUTYNIN CHLORIDE 5 MG/1
5 TABLET ORAL 2 TIMES DAILY
Status: DISCONTINUED | OUTPATIENT
Start: 2019-03-12 | End: 2019-03-18 | Stop reason: HOSPADM

## 2019-03-12 RX ORDER — IPRATROPIUM BROMIDE AND ALBUTEROL SULFATE 2.5; .5 MG/3ML; MG/3ML
1 SOLUTION RESPIRATORY (INHALATION)
Status: DISCONTINUED | OUTPATIENT
Start: 2019-03-13 | End: 2019-03-12

## 2019-03-12 RX ORDER — OSELTAMIVIR PHOSPHATE 30 MG/1
30 CAPSULE ORAL 2 TIMES DAILY
Status: COMPLETED | OUTPATIENT
Start: 2019-03-12 | End: 2019-03-17

## 2019-03-12 RX ORDER — ACETAMINOPHEN 325 MG/1
650 TABLET ORAL ONCE
Status: COMPLETED | OUTPATIENT
Start: 2019-03-12 | End: 2019-03-12

## 2019-03-12 RX ORDER — IPRATROPIUM BROMIDE AND ALBUTEROL SULFATE 2.5; .5 MG/3ML; MG/3ML
1 SOLUTION RESPIRATORY (INHALATION) 4 TIMES DAILY
Status: DISCONTINUED | OUTPATIENT
Start: 2019-03-13 | End: 2019-03-16

## 2019-03-12 RX ORDER — SIMVASTATIN 10 MG
10 TABLET ORAL NIGHTLY
Status: DISCONTINUED | OUTPATIENT
Start: 2019-03-12 | End: 2019-03-18 | Stop reason: HOSPADM

## 2019-03-12 RX ORDER — RISPERIDONE 0.25 MG/1
0.25 TABLET, FILM COATED ORAL 2 TIMES DAILY
Status: DISCONTINUED | OUTPATIENT
Start: 2019-03-12 | End: 2019-03-13

## 2019-03-12 RX ORDER — DEXTROSE MONOHYDRATE 50 MG/ML
100 INJECTION, SOLUTION INTRAVENOUS PRN
Status: DISCONTINUED | OUTPATIENT
Start: 2019-03-12 | End: 2019-03-18 | Stop reason: HOSPADM

## 2019-03-12 RX ORDER — SODIUM CHLORIDE 0.9 % (FLUSH) 0.9 %
10 SYRINGE (ML) INJECTION EVERY 12 HOURS SCHEDULED
Status: DISCONTINUED | OUTPATIENT
Start: 2019-03-12 | End: 2019-03-16 | Stop reason: SDUPTHER

## 2019-03-12 RX ORDER — HYDRALAZINE HYDROCHLORIDE 10 MG/1
10 TABLET, FILM COATED ORAL 3 TIMES DAILY
COMMUNITY

## 2019-03-12 RX ADMIN — ACETAMINOPHEN 650 MG: 325 TABLET ORAL at 15:15

## 2019-03-12 RX ADMIN — RISPERIDONE 0.25 MG: 0.25 TABLET ORAL at 21:21

## 2019-03-12 RX ADMIN — DEXTROSE MONOHYDRATE 500 MG: 50 INJECTION, SOLUTION INTRAVENOUS at 14:44

## 2019-03-12 RX ADMIN — METHYLPREDNISOLONE SODIUM SUCCINATE 125 MG: 125 INJECTION, POWDER, FOR SOLUTION INTRAMUSCULAR; INTRAVENOUS at 15:16

## 2019-03-12 RX ADMIN — Medication 10 ML: at 21:21

## 2019-03-12 RX ADMIN — OSELTAMIVIR PHOSPHATE 30 MG: 6 POWDER, FOR SUSPENSION ORAL at 14:48

## 2019-03-12 RX ADMIN — OXYBUTYNIN CHLORIDE 5 MG: 5 TABLET ORAL at 21:20

## 2019-03-12 RX ADMIN — IPRATROPIUM BROMIDE AND ALBUTEROL SULFATE 1 AMPULE: .5; 3 SOLUTION RESPIRATORY (INHALATION) at 20:05

## 2019-03-12 RX ADMIN — INSULIN LISPRO 2 UNITS: 100 INJECTION, SOLUTION INTRAVENOUS; SUBCUTANEOUS at 23:56

## 2019-03-12 RX ADMIN — SIMVASTATIN 10 MG: 10 TABLET, FILM COATED ORAL at 21:21

## 2019-03-12 RX ADMIN — SODIUM CHLORIDE: 9 INJECTION, SOLUTION INTRAVENOUS at 21:21

## 2019-03-12 RX ADMIN — SODIUM CHLORIDE 2040 ML: 9 INJECTION, SOLUTION INTRAVENOUS at 14:47

## 2019-03-12 RX ADMIN — ASPIRIN 81 MG 81 MG: 81 TABLET ORAL at 21:21

## 2019-03-12 RX ADMIN — VANCOMYCIN HYDROCHLORIDE 1250 MG: 1 INJECTION, POWDER, LYOPHILIZED, FOR SOLUTION INTRAVENOUS at 15:49

## 2019-03-12 RX ADMIN — INSULIN GLARGINE 22 UNITS: 100 INJECTION, SOLUTION SUBCUTANEOUS at 21:22

## 2019-03-12 RX ADMIN — OSELTAMIVIR PHOSPHATE 30 MG: 30 CAPSULE ORAL at 21:20

## 2019-03-12 ASSESSMENT — ENCOUNTER SYMPTOMS: SHORTNESS OF BREATH: 1

## 2019-03-13 LAB
ANION GAP SERPL CALCULATED.3IONS-SCNC: 11 MMOL/L (ref 3–16)
BASOPHILS ABSOLUTE: 0 K/UL (ref 0–0.2)
BASOPHILS RELATIVE PERCENT: 0.1 %
BUN BLDV-MCNC: 26 MG/DL (ref 7–20)
CALCIUM SERPL-MCNC: 8.9 MG/DL (ref 8.3–10.6)
CHLORIDE BLD-SCNC: 103 MMOL/L (ref 99–110)
CO2: 24 MMOL/L (ref 21–32)
CREAT SERPL-MCNC: 1.2 MG/DL (ref 0.8–1.3)
EOSINOPHILS ABSOLUTE: 0 K/UL (ref 0–0.6)
EOSINOPHILS RELATIVE PERCENT: 0 %
GFR AFRICAN AMERICAN: >60
GFR NON-AFRICAN AMERICAN: 56
GLUCOSE BLD-MCNC: 309 MG/DL (ref 70–99)
GLUCOSE BLD-MCNC: 360 MG/DL (ref 70–99)
GLUCOSE BLD-MCNC: 369 MG/DL (ref 70–99)
GLUCOSE BLD-MCNC: 383 MG/DL (ref 70–99)
GLUCOSE BLD-MCNC: 392 MG/DL (ref 70–99)
HCT VFR BLD CALC: 31.3 % (ref 40.5–52.5)
HEMOGLOBIN: 10.1 G/DL (ref 13.5–17.5)
LACTIC ACID: 2.6 MMOL/L (ref 0.4–2)
LACTIC ACID: 2.9 MMOL/L (ref 0.4–2)
LYMPHOCYTES ABSOLUTE: 0.9 K/UL (ref 1–5.1)
LYMPHOCYTES RELATIVE PERCENT: 5 %
MCH RBC QN AUTO: 29.6 PG (ref 26–34)
MCHC RBC AUTO-ENTMCNC: 32.1 G/DL (ref 31–36)
MCV RBC AUTO: 92 FL (ref 80–100)
MONOCYTES ABSOLUTE: 1 K/UL (ref 0–1.3)
MONOCYTES RELATIVE PERCENT: 5.9 %
NEUTROPHILS ABSOLUTE: 15.6 K/UL (ref 1.7–7.7)
NEUTROPHILS RELATIVE PERCENT: 89 %
PDW BLD-RTO: 15.3 % (ref 12.4–15.4)
PERFORMED ON: ABNORMAL
PLATELET # BLD: 203 K/UL (ref 135–450)
PMV BLD AUTO: 7.5 FL (ref 5–10.5)
POTASSIUM REFLEX MAGNESIUM: 4.4 MMOL/L (ref 3.5–5.1)
PROCALCITONIN: 22.91 NG/ML (ref 0–0.15)
RBC # BLD: 3.4 M/UL (ref 4.2–5.9)
SODIUM BLD-SCNC: 138 MMOL/L (ref 136–145)
WBC # BLD: 17.6 K/UL (ref 4–11)

## 2019-03-13 PROCEDURE — 1200000000 HC SEMI PRIVATE

## 2019-03-13 PROCEDURE — 2700000000 HC OXYGEN THERAPY PER DAY

## 2019-03-13 PROCEDURE — 6370000000 HC RX 637 (ALT 250 FOR IP): Performed by: HOSPITALIST

## 2019-03-13 PROCEDURE — 6370000000 HC RX 637 (ALT 250 FOR IP): Performed by: INTERNAL MEDICINE

## 2019-03-13 PROCEDURE — 87280 RESPIRATORY SYNCYTIAL AG IF: CPT

## 2019-03-13 PROCEDURE — 80048 BASIC METABOLIC PNL TOTAL CA: CPT

## 2019-03-13 PROCEDURE — 84145 PROCALCITONIN (PCT): CPT

## 2019-03-13 PROCEDURE — 94640 AIRWAY INHALATION TREATMENT: CPT

## 2019-03-13 PROCEDURE — 87070 CULTURE OTHR SPECIMN AEROBIC: CPT

## 2019-03-13 PROCEDURE — 83605 ASSAY OF LACTIC ACID: CPT

## 2019-03-13 PROCEDURE — 87276 INFLUENZA A AG IF: CPT

## 2019-03-13 PROCEDURE — 2580000003 HC RX 258: Performed by: INTERNAL MEDICINE

## 2019-03-13 PROCEDURE — 36415 COLL VENOUS BLD VENIPUNCTURE: CPT

## 2019-03-13 PROCEDURE — 2580000003 HC RX 258: Performed by: PHYSICIAN ASSISTANT

## 2019-03-13 PROCEDURE — 85025 COMPLETE CBC W/AUTO DIFF WBC: CPT

## 2019-03-13 PROCEDURE — 2500000003 HC RX 250 WO HCPCS: Performed by: INTERNAL MEDICINE

## 2019-03-13 PROCEDURE — 87275 INFLUENZA B AG IF: CPT

## 2019-03-13 PROCEDURE — 99232 SBSQ HOSP IP/OBS MODERATE 35: CPT | Performed by: INTERNAL MEDICINE

## 2019-03-13 PROCEDURE — 87205 SMEAR GRAM STAIN: CPT

## 2019-03-13 PROCEDURE — 87633 RESP VIRUS 12-25 TARGETS: CPT

## 2019-03-13 PROCEDURE — 87798 DETECT AGENT NOS DNA AMP: CPT

## 2019-03-13 PROCEDURE — 94761 N-INVAS EAR/PLS OXIMETRY MLT: CPT

## 2019-03-13 PROCEDURE — 87581 M.PNEUMON DNA AMP PROBE: CPT

## 2019-03-13 PROCEDURE — 87260 ADENOVIRUS AG IF: CPT

## 2019-03-13 PROCEDURE — 87486 CHLMYD PNEUM DNA AMP PROBE: CPT

## 2019-03-13 PROCEDURE — 6370000000 HC RX 637 (ALT 250 FOR IP): Performed by: PHYSICIAN ASSISTANT

## 2019-03-13 PROCEDURE — 87279 PARAINFLUENZA AG IF: CPT

## 2019-03-13 PROCEDURE — 87299 ANTIBODY DETECTION NOS IF: CPT

## 2019-03-13 PROCEDURE — 6360000002 HC RX W HCPCS: Performed by: PHYSICIAN ASSISTANT

## 2019-03-13 PROCEDURE — 87185 SC STD ENZYME DETCJ PER NZM: CPT

## 2019-03-13 RX ORDER — INSULIN GLARGINE 100 [IU]/ML
10 INJECTION, SOLUTION SUBCUTANEOUS ONCE
Status: COMPLETED | OUTPATIENT
Start: 2019-03-13 | End: 2019-03-13

## 2019-03-13 RX ORDER — RISPERIDONE 0.25 MG/1
0.25 TABLET, FILM COATED ORAL 2 TIMES DAILY
Status: DISCONTINUED | OUTPATIENT
Start: 2019-03-14 | End: 2019-03-18 | Stop reason: HOSPADM

## 2019-03-13 RX ADMIN — INSULIN LISPRO 5 UNITS: 100 INJECTION, SOLUTION INTRAVENOUS; SUBCUTANEOUS at 11:19

## 2019-03-13 RX ADMIN — DEXTROSE MONOHYDRATE 500 MG: 50 INJECTION, SOLUTION INTRAVENOUS at 08:42

## 2019-03-13 RX ADMIN — SODIUM CHLORIDE: 9 INJECTION, SOLUTION INTRAVENOUS at 21:35

## 2019-03-13 RX ADMIN — ASPIRIN 81 MG 81 MG: 81 TABLET ORAL at 08:40

## 2019-03-13 RX ADMIN — IPRATROPIUM BROMIDE AND ALBUTEROL SULFATE 1 AMPULE: .5; 3 SOLUTION RESPIRATORY (INHALATION) at 16:00

## 2019-03-13 RX ADMIN — INSULIN LISPRO 5 UNITS: 100 INJECTION, SOLUTION INTRAVENOUS; SUBCUTANEOUS at 08:49

## 2019-03-13 RX ADMIN — INSULIN LISPRO 5 UNITS: 100 INJECTION, SOLUTION INTRAVENOUS; SUBCUTANEOUS at 08:52

## 2019-03-13 RX ADMIN — IPRATROPIUM BROMIDE AND ALBUTEROL SULFATE 1 AMPULE: .5; 3 SOLUTION RESPIRATORY (INHALATION) at 07:32

## 2019-03-13 RX ADMIN — OXYBUTYNIN CHLORIDE 5 MG: 5 TABLET ORAL at 08:40

## 2019-03-13 RX ADMIN — INSULIN LISPRO 3 UNITS: 100 INJECTION, SOLUTION INTRAVENOUS; SUBCUTANEOUS at 21:35

## 2019-03-13 RX ADMIN — MAGNESIUM HYDROXIDE 30 ML: 400 SUSPENSION ORAL at 18:20

## 2019-03-13 RX ADMIN — SIMVASTATIN 10 MG: 10 TABLET, FILM COATED ORAL at 21:35

## 2019-03-13 RX ADMIN — OSELTAMIVIR PHOSPHATE 30 MG: 30 CAPSULE ORAL at 08:40

## 2019-03-13 RX ADMIN — LEVOTHYROXINE SODIUM 75 MCG: 25 TABLET ORAL at 07:05

## 2019-03-13 RX ADMIN — DOXYCYCLINE 100 MG: 100 INJECTION, POWDER, LYOPHILIZED, FOR SOLUTION INTRAVENOUS at 14:42

## 2019-03-13 RX ADMIN — IPRATROPIUM BROMIDE AND ALBUTEROL SULFATE 1 AMPULE: .5; 3 SOLUTION RESPIRATORY (INHALATION) at 11:28

## 2019-03-13 RX ADMIN — ENOXAPARIN SODIUM 40 MG: 40 INJECTION SUBCUTANEOUS at 08:42

## 2019-03-13 RX ADMIN — VANCOMYCIN HYDROCHLORIDE 1250 MG: 1 INJECTION, POWDER, LYOPHILIZED, FOR SOLUTION INTRAVENOUS at 11:16

## 2019-03-13 RX ADMIN — INSULIN GLARGINE 10 UNITS: 100 INJECTION, SOLUTION SUBCUTANEOUS at 08:45

## 2019-03-13 RX ADMIN — INSULIN LISPRO 4 UNITS: 100 INJECTION, SOLUTION INTRAVENOUS; SUBCUTANEOUS at 17:02

## 2019-03-13 RX ADMIN — INSULIN GLARGINE 22 UNITS: 100 INJECTION, SOLUTION SUBCUTANEOUS at 21:35

## 2019-03-13 RX ADMIN — INSULIN LISPRO 5 UNITS: 100 INJECTION, SOLUTION INTRAVENOUS; SUBCUTANEOUS at 11:25

## 2019-03-13 RX ADMIN — OSELTAMIVIR PHOSPHATE 30 MG: 30 CAPSULE ORAL at 21:34

## 2019-03-13 RX ADMIN — INSULIN LISPRO 10 UNITS: 100 INJECTION, SOLUTION INTRAVENOUS; SUBCUTANEOUS at 17:02

## 2019-03-13 RX ADMIN — OXYBUTYNIN CHLORIDE 5 MG: 5 TABLET ORAL at 21:34

## 2019-03-13 RX ADMIN — IPRATROPIUM BROMIDE AND ALBUTEROL SULFATE 1 AMPULE: .5; 3 SOLUTION RESPIRATORY (INHALATION) at 19:05

## 2019-03-13 RX ADMIN — SODIUM CHLORIDE: 9 INJECTION, SOLUTION INTRAVENOUS at 11:06

## 2019-03-14 ENCOUNTER — APPOINTMENT (OUTPATIENT)
Dept: GENERAL RADIOLOGY | Age: 84
DRG: 871 | End: 2019-03-14
Payer: MEDICARE

## 2019-03-14 PROBLEM — J10.1 INFLUENZA A: Status: ACTIVE | Noted: 2019-03-14

## 2019-03-14 LAB
ALBUMIN SERPL-MCNC: 3.2 G/DL (ref 3.4–5)
ANION GAP SERPL CALCULATED.3IONS-SCNC: 10 MMOL/L (ref 3–16)
BASE EXCESS ARTERIAL: -2.2 MMOL/L (ref -3–3)
BASOPHILS ABSOLUTE: 0 K/UL (ref 0–0.2)
BASOPHILS RELATIVE PERCENT: 0.1 %
BUN BLDV-MCNC: 29 MG/DL (ref 7–20)
CALCIUM SERPL-MCNC: 9 MG/DL (ref 8.3–10.6)
CARBOXYHEMOGLOBIN ARTERIAL: 1.4 % (ref 0–1.5)
CHLORIDE BLD-SCNC: 101 MMOL/L (ref 99–110)
CO2: 25 MMOL/L (ref 21–32)
CREAT SERPL-MCNC: 1.1 MG/DL (ref 0.8–1.3)
EOSINOPHILS ABSOLUTE: 0 K/UL (ref 0–0.6)
EOSINOPHILS RELATIVE PERCENT: 0 %
GFR AFRICAN AMERICAN: >60
GFR NON-AFRICAN AMERICAN: >60
GLUCOSE BLD-MCNC: 236 MG/DL (ref 70–99)
GLUCOSE BLD-MCNC: 294 MG/DL (ref 70–99)
GLUCOSE BLD-MCNC: 301 MG/DL (ref 70–99)
GLUCOSE BLD-MCNC: 323 MG/DL (ref 70–99)
GLUCOSE BLD-MCNC: 327 MG/DL (ref 70–99)
GLUCOSE BLD-MCNC: 337 MG/DL (ref 70–99)
HCO3 ARTERIAL: 24.7 MMOL/L (ref 21–29)
HCT VFR BLD CALC: 29.3 % (ref 40.5–52.5)
HEMOGLOBIN, ART, EXTENDED: 13.6 G/DL (ref 13.5–17.5)
HEMOGLOBIN: 9.6 G/DL (ref 13.5–17.5)
LACTIC ACID: 1.5 MMOL/L (ref 0.4–2)
LV EF: 50 %
LVEF MODALITY: NORMAL
LYMPHOCYTES ABSOLUTE: 1.2 K/UL (ref 1–5.1)
LYMPHOCYTES RELATIVE PERCENT: 7.6 %
MCH RBC QN AUTO: 29.8 PG (ref 26–34)
MCHC RBC AUTO-ENTMCNC: 32.7 G/DL (ref 31–36)
MCV RBC AUTO: 91.1 FL (ref 80–100)
METHEMOGLOBIN ARTERIAL: 0 %
MONOCYTES ABSOLUTE: 1.2 K/UL (ref 0–1.3)
MONOCYTES RELATIVE PERCENT: 7.8 %
NEUTROPHILS ABSOLUTE: 13.3 K/UL (ref 1.7–7.7)
NEUTROPHILS RELATIVE PERCENT: 84.5 %
O2 CONTENT ARTERIAL: 18 ML/DL
O2 SAT, ARTERIAL: 94.2 %
O2 THERAPY: ABNORMAL
ORGANISM: ABNORMAL
PCO2 ARTERIAL: 50.7 MMHG (ref 35–45)
PDW BLD-RTO: 14.8 % (ref 12.4–15.4)
PERFORMED ON: ABNORMAL
PH ARTERIAL: 7.3 (ref 7.35–7.45)
PHOSPHORUS: 2 MG/DL (ref 2.5–4.9)
PLATELET # BLD: 229 K/UL (ref 135–450)
PMV BLD AUTO: 7.9 FL (ref 5–10.5)
PO2 ARTERIAL: 77.7 MMHG (ref 75–108)
POTASSIUM SERPL-SCNC: 4.1 MMOL/L (ref 3.5–5.1)
RBC # BLD: 3.22 M/UL (ref 4.2–5.9)
REPORT: NORMAL
RESPIRATORY PANEL PCR: ABNORMAL
RESPIRATORY PANEL PCR: ABNORMAL
SODIUM BLD-SCNC: 136 MMOL/L (ref 136–145)
TCO2 ARTERIAL: 26.2 MMOL/L
VANCOMYCIN TROUGH: 14.9 UG/ML (ref 10–20)
WBC # BLD: 15.8 K/UL (ref 4–11)

## 2019-03-14 PROCEDURE — 80202 ASSAY OF VANCOMYCIN: CPT

## 2019-03-14 PROCEDURE — 83605 ASSAY OF LACTIC ACID: CPT

## 2019-03-14 PROCEDURE — 2580000003 HC RX 258: Performed by: INTERNAL MEDICINE

## 2019-03-14 PROCEDURE — 36415 COLL VENOUS BLD VENIPUNCTURE: CPT

## 2019-03-14 PROCEDURE — 2700000000 HC OXYGEN THERAPY PER DAY

## 2019-03-14 PROCEDURE — 99233 SBSQ HOSP IP/OBS HIGH 50: CPT | Performed by: INTERNAL MEDICINE

## 2019-03-14 PROCEDURE — 6370000000 HC RX 637 (ALT 250 FOR IP): Performed by: PHYSICIAN ASSISTANT

## 2019-03-14 PROCEDURE — 2500000003 HC RX 250 WO HCPCS: Performed by: INTERNAL MEDICINE

## 2019-03-14 PROCEDURE — 6360000002 HC RX W HCPCS: Performed by: PHYSICIAN ASSISTANT

## 2019-03-14 PROCEDURE — 94762 N-INVAS EAR/PLS OXIMTRY CONT: CPT

## 2019-03-14 PROCEDURE — 2580000003 HC RX 258

## 2019-03-14 PROCEDURE — 6370000000 HC RX 637 (ALT 250 FOR IP): Performed by: INTERNAL MEDICINE

## 2019-03-14 PROCEDURE — 36600 WITHDRAWAL OF ARTERIAL BLOOD: CPT

## 2019-03-14 PROCEDURE — 94640 AIRWAY INHALATION TREATMENT: CPT

## 2019-03-14 PROCEDURE — 80069 RENAL FUNCTION PANEL: CPT

## 2019-03-14 PROCEDURE — 99291 CRITICAL CARE FIRST HOUR: CPT | Performed by: INTERNAL MEDICINE

## 2019-03-14 PROCEDURE — 83036 HEMOGLOBIN GLYCOSYLATED A1C: CPT

## 2019-03-14 PROCEDURE — 94660 CPAP INITIATION&MGMT: CPT

## 2019-03-14 PROCEDURE — 85025 COMPLETE CBC W/AUTO DIFF WBC: CPT

## 2019-03-14 PROCEDURE — 6360000002 HC RX W HCPCS: Performed by: INTERNAL MEDICINE

## 2019-03-14 PROCEDURE — 82803 BLOOD GASES ANY COMBINATION: CPT

## 2019-03-14 PROCEDURE — 71045 X-RAY EXAM CHEST 1 VIEW: CPT

## 2019-03-14 PROCEDURE — 2000000000 HC ICU R&B

## 2019-03-14 PROCEDURE — 2580000003 HC RX 258: Performed by: PHYSICIAN ASSISTANT

## 2019-03-14 PROCEDURE — 93306 TTE W/DOPPLER COMPLETE: CPT

## 2019-03-14 RX ORDER — FUROSEMIDE 10 MG/ML
40 INJECTION INTRAMUSCULAR; INTRAVENOUS ONCE
Status: COMPLETED | OUTPATIENT
Start: 2019-03-14 | End: 2019-03-14

## 2019-03-14 RX ORDER — HYDRALAZINE HYDROCHLORIDE 20 MG/ML
10 INJECTION INTRAMUSCULAR; INTRAVENOUS EVERY 6 HOURS PRN
Status: DISCONTINUED | OUTPATIENT
Start: 2019-03-14 | End: 2019-03-18 | Stop reason: HOSPADM

## 2019-03-14 RX ORDER — FUROSEMIDE 10 MG/ML
INJECTION INTRAMUSCULAR; INTRAVENOUS
Status: DISPENSED
Start: 2019-03-14 | End: 2019-03-14

## 2019-03-14 RX ORDER — INSULIN GLARGINE 100 [IU]/ML
20 INJECTION, SOLUTION SUBCUTANEOUS EVERY MORNING
Status: DISCONTINUED | OUTPATIENT
Start: 2019-03-15 | End: 2019-03-15

## 2019-03-14 RX ORDER — SODIUM CHLORIDE 9 MG/ML
INJECTION, SOLUTION INTRAVENOUS
Status: COMPLETED
Start: 2019-03-14 | End: 2019-03-14

## 2019-03-14 RX ORDER — IPRATROPIUM BROMIDE AND ALBUTEROL SULFATE 2.5; .5 MG/3ML; MG/3ML
1 SOLUTION RESPIRATORY (INHALATION) EVERY 4 HOURS PRN
Status: DISCONTINUED | OUTPATIENT
Start: 2019-03-14 | End: 2019-03-18 | Stop reason: HOSPADM

## 2019-03-14 RX ADMIN — AZTREONAM 1000 MG: 1 INJECTION, POWDER, LYOPHILIZED, FOR SOLUTION INTRAMUSCULAR; INTRAVENOUS at 21:10

## 2019-03-14 RX ADMIN — RISPERIDONE 0.25 MG: 0.25 TABLET ORAL at 11:32

## 2019-03-14 RX ADMIN — IPRATROPIUM BROMIDE AND ALBUTEROL SULFATE 1 AMPULE: .5; 3 SOLUTION RESPIRATORY (INHALATION) at 04:25

## 2019-03-14 RX ADMIN — Medication 10 ML: at 20:39

## 2019-03-14 RX ADMIN — RISPERIDONE 0.25 MG: 0.25 TABLET ORAL at 20:39

## 2019-03-14 RX ADMIN — SIMVASTATIN 10 MG: 10 TABLET, FILM COATED ORAL at 20:39

## 2019-03-14 RX ADMIN — AZTREONAM 1000 MG: 1 INJECTION, POWDER, LYOPHILIZED, FOR SOLUTION INTRAMUSCULAR; INTRAVENOUS at 14:40

## 2019-03-14 RX ADMIN — OSELTAMIVIR PHOSPHATE 30 MG: 30 CAPSULE ORAL at 11:32

## 2019-03-14 RX ADMIN — MUPIROCIN: 20 OINTMENT TOPICAL at 11:33

## 2019-03-14 RX ADMIN — LEVOTHYROXINE SODIUM 75 MCG: 25 TABLET ORAL at 06:09

## 2019-03-14 RX ADMIN — OXYBUTYNIN CHLORIDE 5 MG: 5 TABLET ORAL at 20:39

## 2019-03-14 RX ADMIN — OXYBUTYNIN CHLORIDE 5 MG: 5 TABLET ORAL at 11:33

## 2019-03-14 RX ADMIN — ASPIRIN 81 MG 81 MG: 81 TABLET ORAL at 11:32

## 2019-03-14 RX ADMIN — IPRATROPIUM BROMIDE AND ALBUTEROL SULFATE 1 AMPULE: .5; 3 SOLUTION RESPIRATORY (INHALATION) at 11:22

## 2019-03-14 RX ADMIN — VANCOMYCIN HYDROCHLORIDE 1250 MG: 1 INJECTION, POWDER, LYOPHILIZED, FOR SOLUTION INTRAVENOUS at 04:15

## 2019-03-14 RX ADMIN — SODIUM CHLORIDE 250 ML: 9 INJECTION, SOLUTION INTRAVENOUS at 11:40

## 2019-03-14 RX ADMIN — OSELTAMIVIR PHOSPHATE 30 MG: 30 CAPSULE ORAL at 20:39

## 2019-03-14 RX ADMIN — DOXYCYCLINE 100 MG: 100 INJECTION, POWDER, LYOPHILIZED, FOR SOLUTION INTRAVENOUS at 02:43

## 2019-03-14 RX ADMIN — DOXYCYCLINE 100 MG: 100 INJECTION, POWDER, LYOPHILIZED, FOR SOLUTION INTRAVENOUS at 14:39

## 2019-03-14 RX ADMIN — INSULIN LISPRO 9 UNITS: 100 INJECTION, SOLUTION INTRAVENOUS; SUBCUTANEOUS at 11:32

## 2019-03-14 RX ADMIN — INSULIN LISPRO 3 UNITS: 100 INJECTION, SOLUTION INTRAVENOUS; SUBCUTANEOUS at 20:40

## 2019-03-14 RX ADMIN — IPRATROPIUM BROMIDE AND ALBUTEROL SULFATE 1 AMPULE: .5; 3 SOLUTION RESPIRATORY (INHALATION) at 15:14

## 2019-03-14 RX ADMIN — MUPIROCIN: 20 OINTMENT TOPICAL at 20:40

## 2019-03-14 RX ADMIN — IPRATROPIUM BROMIDE AND ALBUTEROL SULFATE 1 AMPULE: .5; 3 SOLUTION RESPIRATORY (INHALATION) at 07:22

## 2019-03-14 RX ADMIN — ENOXAPARIN SODIUM 40 MG: 40 INJECTION SUBCUTANEOUS at 11:32

## 2019-03-14 RX ADMIN — INSULIN LISPRO 12 UNITS: 100 INJECTION, SOLUTION INTRAVENOUS; SUBCUTANEOUS at 16:32

## 2019-03-14 RX ADMIN — IPRATROPIUM BROMIDE AND ALBUTEROL SULFATE 1 AMPULE: .5; 3 SOLUTION RESPIRATORY (INHALATION) at 19:21

## 2019-03-14 RX ADMIN — FUROSEMIDE 40 MG: 10 INJECTION, SOLUTION INTRAMUSCULAR; INTRAVENOUS at 07:39

## 2019-03-14 RX ADMIN — Medication 10 ML: at 11:33

## 2019-03-14 RX ADMIN — VANCOMYCIN HYDROCHLORIDE 1250 MG: 1 INJECTION, POWDER, LYOPHILIZED, FOR SOLUTION INTRAVENOUS at 22:19

## 2019-03-14 ASSESSMENT — PAIN SCALES - GENERAL
PAINLEVEL_OUTOF10: 0
PAINLEVEL_OUTOF10: 0

## 2019-03-15 ENCOUNTER — APPOINTMENT (OUTPATIENT)
Dept: GENERAL RADIOLOGY | Age: 84
DRG: 871 | End: 2019-03-15
Payer: MEDICARE

## 2019-03-15 LAB
BASOPHILS ABSOLUTE: 0 K/UL (ref 0–0.2)
BASOPHILS RELATIVE PERCENT: 0.1 %
CULTURE, RESPIRATORY: ABNORMAL
CULTURE, RESPIRATORY: ABNORMAL
EKG ATRIAL RATE: 147 BPM
EKG DIAGNOSIS: NORMAL
EKG Q-T INTERVAL: 296 MS
EKG QRS DURATION: 94 MS
EKG QTC CALCULATION (BAZETT): 427 MS
EKG R AXIS: -1 DEGREES
EKG T AXIS: 102 DEGREES
EKG VENTRICULAR RATE: 125 BPM
EOSINOPHILS ABSOLUTE: 0 K/UL (ref 0–0.6)
EOSINOPHILS RELATIVE PERCENT: 0.4 %
ESTIMATED AVERAGE GLUCOSE: 171.4 MG/DL
GLUCOSE BLD-MCNC: 161 MG/DL (ref 70–99)
GLUCOSE BLD-MCNC: 240 MG/DL (ref 70–99)
GLUCOSE BLD-MCNC: 251 MG/DL (ref 70–99)
GLUCOSE BLD-MCNC: 323 MG/DL (ref 70–99)
GRAM STAIN RESULT: ABNORMAL
HBA1C MFR BLD: 7.6 %
HCT VFR BLD CALC: 30.2 % (ref 40.5–52.5)
HEMOGLOBIN: 9.9 G/DL (ref 13.5–17.5)
LYMPHOCYTES ABSOLUTE: 1.3 K/UL (ref 1–5.1)
LYMPHOCYTES RELATIVE PERCENT: 11.5 %
MCH RBC QN AUTO: 29.7 PG (ref 26–34)
MCHC RBC AUTO-ENTMCNC: 32.6 G/DL (ref 31–36)
MCV RBC AUTO: 91.2 FL (ref 80–100)
MONOCYTES ABSOLUTE: 0.7 K/UL (ref 0–1.3)
MONOCYTES RELATIVE PERCENT: 5.7 %
NEUTROPHILS ABSOLUTE: 9.5 K/UL (ref 1.7–7.7)
NEUTROPHILS RELATIVE PERCENT: 82.3 %
ORGANISM: ABNORMAL
PDW BLD-RTO: 14.9 % (ref 12.4–15.4)
PERFORMED ON: ABNORMAL
PLATELET # BLD: 242 K/UL (ref 135–450)
PMV BLD AUTO: 8 FL (ref 5–10.5)
RBC # BLD: 3.32 M/UL (ref 4.2–5.9)
URINE CULTURE, ROUTINE: ABNORMAL
WBC # BLD: 11.6 K/UL (ref 4–11)

## 2019-03-15 PROCEDURE — 85025 COMPLETE CBC W/AUTO DIFF WBC: CPT

## 2019-03-15 PROCEDURE — 94660 CPAP INITIATION&MGMT: CPT

## 2019-03-15 PROCEDURE — 2500000003 HC RX 250 WO HCPCS: Performed by: INTERNAL MEDICINE

## 2019-03-15 PROCEDURE — 6370000000 HC RX 637 (ALT 250 FOR IP): Performed by: INTERNAL MEDICINE

## 2019-03-15 PROCEDURE — 2580000003 HC RX 258: Performed by: INTERNAL MEDICINE

## 2019-03-15 PROCEDURE — 2580000003 HC RX 258: Performed by: PHYSICIAN ASSISTANT

## 2019-03-15 PROCEDURE — 97530 THERAPEUTIC ACTIVITIES: CPT

## 2019-03-15 PROCEDURE — 97165 OT EVAL LOW COMPLEX 30 MIN: CPT

## 2019-03-15 PROCEDURE — 36415 COLL VENOUS BLD VENIPUNCTURE: CPT

## 2019-03-15 PROCEDURE — 6370000000 HC RX 637 (ALT 250 FOR IP): Performed by: PHYSICIAN ASSISTANT

## 2019-03-15 PROCEDURE — 94762 N-INVAS EAR/PLS OXIMTRY CONT: CPT

## 2019-03-15 PROCEDURE — 6360000002 HC RX W HCPCS: Performed by: PHYSICIAN ASSISTANT

## 2019-03-15 PROCEDURE — 99233 SBSQ HOSP IP/OBS HIGH 50: CPT | Performed by: INTERNAL MEDICINE

## 2019-03-15 PROCEDURE — 94640 AIRWAY INHALATION TREATMENT: CPT

## 2019-03-15 PROCEDURE — 71045 X-RAY EXAM CHEST 1 VIEW: CPT

## 2019-03-15 PROCEDURE — 93010 ELECTROCARDIOGRAM REPORT: CPT | Performed by: INTERNAL MEDICINE

## 2019-03-15 PROCEDURE — 2060000000 HC ICU INTERMEDIATE R&B

## 2019-03-15 PROCEDURE — 93005 ELECTROCARDIOGRAM TRACING: CPT | Performed by: INTERNAL MEDICINE

## 2019-03-15 PROCEDURE — 97162 PT EVAL MOD COMPLEX 30 MIN: CPT

## 2019-03-15 RX ORDER — INSULIN GLARGINE 100 [IU]/ML
22 INJECTION, SOLUTION SUBCUTANEOUS EVERY MORNING
Status: DISCONTINUED | OUTPATIENT
Start: 2019-03-15 | End: 2019-03-18 | Stop reason: HOSPADM

## 2019-03-15 RX ORDER — FENTANYL CITRATE 50 UG/ML
INJECTION, SOLUTION INTRAMUSCULAR; INTRAVENOUS
Status: DISPENSED
Start: 2019-03-15 | End: 2019-03-16

## 2019-03-15 RX ORDER — AZITHROMYCIN 250 MG/1
500 TABLET, FILM COATED ORAL DAILY
Status: COMPLETED | OUTPATIENT
Start: 2019-03-15 | End: 2019-03-15

## 2019-03-15 RX ORDER — AZITHROMYCIN 250 MG/1
250 TABLET, FILM COATED ORAL DAILY
Status: DISCONTINUED | OUTPATIENT
Start: 2019-03-16 | End: 2019-03-16

## 2019-03-15 RX ORDER — DILTIAZEM HYDROCHLORIDE 5 MG/ML
10 INJECTION INTRAVENOUS ONCE
Status: DISCONTINUED | OUTPATIENT
Start: 2019-03-15 | End: 2019-03-15

## 2019-03-15 RX ORDER — DOXYCYCLINE HYCLATE 100 MG
100 TABLET ORAL EVERY 12 HOURS SCHEDULED
Status: DISCONTINUED | OUTPATIENT
Start: 2019-03-15 | End: 2019-03-15

## 2019-03-15 RX ADMIN — AZTREONAM 1000 MG: 1 INJECTION, POWDER, LYOPHILIZED, FOR SOLUTION INTRAMUSCULAR; INTRAVENOUS at 05:13

## 2019-03-15 RX ADMIN — INSULIN LISPRO 9 UNITS: 100 INJECTION, SOLUTION INTRAVENOUS; SUBCUTANEOUS at 13:10

## 2019-03-15 RX ADMIN — INSULIN LISPRO 6 UNITS: 100 INJECTION, SOLUTION INTRAVENOUS; SUBCUTANEOUS at 17:19

## 2019-03-15 RX ADMIN — ASPIRIN 81 MG 81 MG: 81 TABLET ORAL at 08:17

## 2019-03-15 RX ADMIN — IPRATROPIUM BROMIDE AND ALBUTEROL SULFATE 1 AMPULE: .5; 3 SOLUTION RESPIRATORY (INHALATION) at 07:30

## 2019-03-15 RX ADMIN — OSELTAMIVIR PHOSPHATE 30 MG: 30 CAPSULE ORAL at 20:15

## 2019-03-15 RX ADMIN — Medication 10 ML: at 20:16

## 2019-03-15 RX ADMIN — OXYBUTYNIN CHLORIDE 5 MG: 5 TABLET ORAL at 20:15

## 2019-03-15 RX ADMIN — DOXYCYCLINE HYCLATE 100 MG: 100 TABLET, COATED ORAL at 13:10

## 2019-03-15 RX ADMIN — IPRATROPIUM BROMIDE AND ALBUTEROL SULFATE 1 AMPULE: .5; 3 SOLUTION RESPIRATORY (INHALATION) at 16:12

## 2019-03-15 RX ADMIN — RISPERIDONE 0.25 MG: 0.25 TABLET ORAL at 20:15

## 2019-03-15 RX ADMIN — DOXYCYCLINE 100 MG: 100 INJECTION, POWDER, LYOPHILIZED, FOR SOLUTION INTRAVENOUS at 01:59

## 2019-03-15 RX ADMIN — INSULIN GLARGINE 22 UNITS: 100 INJECTION, SOLUTION SUBCUTANEOUS at 10:56

## 2019-03-15 RX ADMIN — AZITHROMYCIN 500 MG: 250 TABLET, FILM COATED ORAL at 13:46

## 2019-03-15 RX ADMIN — IPRATROPIUM BROMIDE AND ALBUTEROL SULFATE 1 AMPULE: .5; 3 SOLUTION RESPIRATORY (INHALATION) at 19:00

## 2019-03-15 RX ADMIN — RISPERIDONE 0.25 MG: 0.25 TABLET ORAL at 08:18

## 2019-03-15 RX ADMIN — INSULIN LISPRO 6 UNITS: 100 INJECTION, SOLUTION INTRAVENOUS; SUBCUTANEOUS at 20:17

## 2019-03-15 RX ADMIN — IPRATROPIUM BROMIDE AND ALBUTEROL SULFATE 1 AMPULE: .5; 3 SOLUTION RESPIRATORY (INHALATION) at 11:26

## 2019-03-15 RX ADMIN — INSULIN LISPRO 3 UNITS: 100 INJECTION, SOLUTION INTRAVENOUS; SUBCUTANEOUS at 08:19

## 2019-03-15 RX ADMIN — OSELTAMIVIR PHOSPHATE 30 MG: 30 CAPSULE ORAL at 08:18

## 2019-03-15 RX ADMIN — AZTREONAM 1000 MG: 2 INJECTION, POWDER, LYOPHILIZED, FOR SOLUTION INTRAMUSCULAR; INTRAVENOUS at 13:46

## 2019-03-15 RX ADMIN — LEVOTHYROXINE SODIUM 75 MCG: 25 TABLET ORAL at 05:12

## 2019-03-15 RX ADMIN — SIMVASTATIN 10 MG: 10 TABLET, FILM COATED ORAL at 20:15

## 2019-03-15 RX ADMIN — MUPIROCIN: 20 OINTMENT TOPICAL at 08:19

## 2019-03-15 RX ADMIN — MUPIROCIN: 20 OINTMENT TOPICAL at 22:24

## 2019-03-15 RX ADMIN — AZTREONAM 1000 MG: 2 INJECTION, POWDER, LYOPHILIZED, FOR SOLUTION INTRAMUSCULAR; INTRAVENOUS at 22:25

## 2019-03-15 RX ADMIN — Medication 10 ML: at 08:18

## 2019-03-15 RX ADMIN — ENOXAPARIN SODIUM 40 MG: 40 INJECTION SUBCUTANEOUS at 08:18

## 2019-03-15 RX ADMIN — OXYBUTYNIN CHLORIDE 5 MG: 5 TABLET ORAL at 08:18

## 2019-03-15 ASSESSMENT — PAIN SCALES - GENERAL: PAINLEVEL_OUTOF10: 0

## 2019-03-16 ENCOUNTER — APPOINTMENT (OUTPATIENT)
Dept: GENERAL RADIOLOGY | Age: 84
DRG: 871 | End: 2019-03-16
Payer: MEDICARE

## 2019-03-16 LAB
ADENOVIRUS, DFA: NEGATIVE
ANISOCYTOSIS: ABNORMAL
BANDED NEUTROPHILS RELATIVE PERCENT: 1 % (ref 0–7)
BASOPHILS ABSOLUTE: 0 K/UL (ref 0–0.2)
BASOPHILS RELATIVE PERCENT: 0 %
EOSINOPHILS ABSOLUTE: 0 K/UL (ref 0–0.6)
EOSINOPHILS RELATIVE PERCENT: 0 %
GLUCOSE BLD-MCNC: 113 MG/DL (ref 70–99)
GLUCOSE BLD-MCNC: 144 MG/DL (ref 70–99)
GLUCOSE BLD-MCNC: 168 MG/DL (ref 70–99)
GLUCOSE BLD-MCNC: 172 MG/DL (ref 70–99)
GLUCOSE BLD-MCNC: 209 MG/DL (ref 70–99)
GLUCOSE BLD-MCNC: 261 MG/DL (ref 70–99)
HCT VFR BLD CALC: 30.7 % (ref 40.5–52.5)
HEMOGLOBIN: 10 G/DL (ref 13.5–17.5)
HYPOCHROMIA: ABNORMAL
INFLUENZA A,DFA: NEGATIVE
INFLUENZA B,DFA: NEGATIVE
INR BLD: 1.03 (ref 0.86–1.14)
LYMPHOCYTES ABSOLUTE: 1.5 K/UL (ref 1–5.1)
LYMPHOCYTES RELATIVE PERCENT: 14 %
MCH RBC QN AUTO: 29.4 PG (ref 26–34)
MCHC RBC AUTO-ENTMCNC: 32.7 G/DL (ref 31–36)
MCV RBC AUTO: 90 FL (ref 80–100)
METAPNEUMOVIRUS, DFA: NEGATIVE
MONOCYTES ABSOLUTE: 0.6 K/UL (ref 0–1.3)
MONOCYTES RELATIVE PERCENT: 6 %
NEUTROPHILS ABSOLUTE: 8.6 K/UL (ref 1.7–7.7)
NEUTROPHILS RELATIVE PERCENT: 79 %
PARAINFLUENZA 1 DFA STAIN: NEGATIVE
PARAINFLUENZA 2 DFA STAIN: NEGATIVE
PARAINFLUENZA 3: NEGATIVE
PDW BLD-RTO: 15.1 % (ref 12.4–15.4)
PERFORMED ON: ABNORMAL
PLATELET # BLD: 291 K/UL (ref 135–450)
PMV BLD AUTO: 8.2 FL (ref 5–10.5)
POIKILOCYTES: ABNORMAL
PROTHROMBIN TIME: 11.7 SEC (ref 9.8–13)
RBC # BLD: 3.41 M/UL (ref 4.2–5.9)
RESPIRATORY SYNCYTIAL VIRUS  (RSV) DFA: NEGATIVE
RSPFA SOURCE: NORMAL
SLIDE REVIEW: ABNORMAL
WBC # BLD: 10.8 K/UL (ref 4–11)

## 2019-03-16 PROCEDURE — 2580000003 HC RX 258: Performed by: INTERNAL MEDICINE

## 2019-03-16 PROCEDURE — 2500000003 HC RX 250 WO HCPCS: Performed by: INTERNAL MEDICINE

## 2019-03-16 PROCEDURE — 99232 SBSQ HOSP IP/OBS MODERATE 35: CPT | Performed by: INTERNAL MEDICINE

## 2019-03-16 PROCEDURE — 6370000000 HC RX 637 (ALT 250 FOR IP): Performed by: INTERNAL MEDICINE

## 2019-03-16 PROCEDURE — 6360000002 HC RX W HCPCS: Performed by: INTERNAL MEDICINE

## 2019-03-16 PROCEDURE — 2060000000 HC ICU INTERMEDIATE R&B

## 2019-03-16 PROCEDURE — 85610 PROTHROMBIN TIME: CPT

## 2019-03-16 PROCEDURE — 85025 COMPLETE CBC W/AUTO DIFF WBC: CPT

## 2019-03-16 PROCEDURE — 36415 COLL VENOUS BLD VENIPUNCTURE: CPT

## 2019-03-16 PROCEDURE — C1769 GUIDE WIRE: HCPCS

## 2019-03-16 PROCEDURE — 94762 N-INVAS EAR/PLS OXIMTRY CONT: CPT

## 2019-03-16 PROCEDURE — 94150 VITAL CAPACITY TEST: CPT

## 2019-03-16 PROCEDURE — 05HY33Z INSERTION OF INFUSION DEVICE INTO UPPER VEIN, PERCUTANEOUS APPROACH: ICD-10-PCS | Performed by: RADIOLOGY

## 2019-03-16 PROCEDURE — 97530 THERAPEUTIC ACTIVITIES: CPT

## 2019-03-16 PROCEDURE — 94640 AIRWAY INHALATION TREATMENT: CPT

## 2019-03-16 RX ORDER — IPRATROPIUM BROMIDE AND ALBUTEROL SULFATE 2.5; .5 MG/3ML; MG/3ML
1 SOLUTION RESPIRATORY (INHALATION) 3 TIMES DAILY
Status: DISCONTINUED | OUTPATIENT
Start: 2019-03-16 | End: 2019-03-18 | Stop reason: HOSPADM

## 2019-03-16 RX ORDER — SODIUM CHLORIDE 9 MG/ML
INJECTION, SOLUTION INTRAVENOUS
Status: DISPENSED
Start: 2019-03-16 | End: 2019-03-17

## 2019-03-16 RX ORDER — LIDOCAINE HYDROCHLORIDE 10 MG/ML
5 INJECTION, SOLUTION INFILTRATION; PERINEURAL ONCE
Status: DISCONTINUED | OUTPATIENT
Start: 2019-03-16 | End: 2019-03-18 | Stop reason: HOSPADM

## 2019-03-16 RX ORDER — SODIUM CHLORIDE 0.9 % (FLUSH) 0.9 %
10 SYRINGE (ML) INJECTION EVERY 12 HOURS SCHEDULED
Status: DISCONTINUED | OUTPATIENT
Start: 2019-03-16 | End: 2019-03-18 | Stop reason: HOSPADM

## 2019-03-16 RX ORDER — SODIUM CHLORIDE 0.9 % (FLUSH) 0.9 %
10 SYRINGE (ML) INJECTION PRN
Status: DISCONTINUED | OUTPATIENT
Start: 2019-03-16 | End: 2019-03-18 | Stop reason: HOSPADM

## 2019-03-16 RX ADMIN — Medication 10 ML: at 09:02

## 2019-03-16 RX ADMIN — Medication 10 ML: at 21:23

## 2019-03-16 RX ADMIN — INSULIN LISPRO 3 UNITS: 100 INJECTION, SOLUTION INTRAVENOUS; SUBCUTANEOUS at 09:10

## 2019-03-16 RX ADMIN — AZTREONAM 1000 MG: 2 INJECTION, POWDER, LYOPHILIZED, FOR SOLUTION INTRAMUSCULAR; INTRAVENOUS at 06:35

## 2019-03-16 RX ADMIN — IPRATROPIUM BROMIDE AND ALBUTEROL SULFATE 1 AMPULE: .5; 3 SOLUTION RESPIRATORY (INHALATION) at 06:41

## 2019-03-16 RX ADMIN — INSULIN LISPRO 9 UNITS: 100 INJECTION, SOLUTION INTRAVENOUS; SUBCUTANEOUS at 12:44

## 2019-03-16 RX ADMIN — MUPIROCIN: 20 OINTMENT TOPICAL at 21:24

## 2019-03-16 RX ADMIN — OXYBUTYNIN CHLORIDE 5 MG: 5 TABLET ORAL at 09:01

## 2019-03-16 RX ADMIN — MUPIROCIN: 20 OINTMENT TOPICAL at 13:17

## 2019-03-16 RX ADMIN — OSELTAMIVIR PHOSPHATE 30 MG: 30 CAPSULE ORAL at 09:02

## 2019-03-16 RX ADMIN — SIMVASTATIN 10 MG: 10 TABLET, FILM COATED ORAL at 21:23

## 2019-03-16 RX ADMIN — ASPIRIN 81 MG 81 MG: 81 TABLET ORAL at 09:02

## 2019-03-16 RX ADMIN — AZTREONAM 1000 MG: 2 INJECTION, POWDER, LYOPHILIZED, FOR SOLUTION INTRAMUSCULAR; INTRAVENOUS at 13:17

## 2019-03-16 RX ADMIN — OSELTAMIVIR PHOSPHATE 30 MG: 30 CAPSULE ORAL at 21:23

## 2019-03-16 RX ADMIN — LEVOTHYROXINE SODIUM 75 MCG: 25 TABLET ORAL at 06:35

## 2019-03-16 RX ADMIN — RISPERIDONE 0.25 MG: 0.25 TABLET ORAL at 21:23

## 2019-03-16 RX ADMIN — OXYBUTYNIN CHLORIDE 5 MG: 5 TABLET ORAL at 21:23

## 2019-03-16 RX ADMIN — ENOXAPARIN SODIUM 40 MG: 40 INJECTION SUBCUTANEOUS at 09:01

## 2019-03-16 RX ADMIN — INSULIN LISPRO 3 UNITS: 100 INJECTION, SOLUTION INTRAVENOUS; SUBCUTANEOUS at 16:45

## 2019-03-16 RX ADMIN — AZTREONAM 1000 MG: 2 INJECTION, POWDER, LYOPHILIZED, FOR SOLUTION INTRAMUSCULAR; INTRAVENOUS at 21:23

## 2019-03-16 RX ADMIN — AZITHROMYCIN 250 MG: 250 TABLET, FILM COATED ORAL at 09:01

## 2019-03-16 RX ADMIN — INSULIN GLARGINE 22 UNITS: 100 INJECTION, SOLUTION SUBCUTANEOUS at 09:09

## 2019-03-16 RX ADMIN — RISPERIDONE 0.25 MG: 0.25 TABLET ORAL at 09:02

## 2019-03-16 RX ADMIN — IPRATROPIUM BROMIDE AND ALBUTEROL SULFATE 1 AMPULE: .5; 3 SOLUTION RESPIRATORY (INHALATION) at 18:58

## 2019-03-16 RX ADMIN — INSULIN LISPRO 3 UNITS: 100 INJECTION, SOLUTION INTRAVENOUS; SUBCUTANEOUS at 21:24

## 2019-03-17 LAB
BANDED NEUTROPHILS RELATIVE PERCENT: 1 % (ref 0–7)
BASOPHILS ABSOLUTE: 0 K/UL (ref 0–0.2)
BASOPHILS RELATIVE PERCENT: 0 %
EOSINOPHILS ABSOLUTE: 0.4 K/UL (ref 0–0.6)
EOSINOPHILS RELATIVE PERCENT: 4 %
GLUCOSE BLD-MCNC: 124 MG/DL (ref 70–99)
GLUCOSE BLD-MCNC: 142 MG/DL (ref 70–99)
GLUCOSE BLD-MCNC: 179 MG/DL (ref 70–99)
GLUCOSE BLD-MCNC: 219 MG/DL (ref 70–99)
GLUCOSE BLD-MCNC: 281 MG/DL (ref 70–99)
GLUCOSE BLD-MCNC: 295 MG/DL (ref 70–99)
HCT VFR BLD CALC: 29.7 % (ref 40.5–52.5)
HEMOGLOBIN: 9.8 G/DL (ref 13.5–17.5)
LYMPHOCYTES ABSOLUTE: 1.2 K/UL (ref 1–5.1)
LYMPHOCYTES RELATIVE PERCENT: 12 %
MCH RBC QN AUTO: 29.4 PG (ref 26–34)
MCHC RBC AUTO-ENTMCNC: 33 G/DL (ref 31–36)
MCV RBC AUTO: 89.2 FL (ref 80–100)
MONOCYTES ABSOLUTE: 0.4 K/UL (ref 0–1.3)
MONOCYTES RELATIVE PERCENT: 4 %
NEUTROPHILS ABSOLUTE: 8.2 K/UL (ref 1.7–7.7)
NEUTROPHILS RELATIVE PERCENT: 79 %
PDW BLD-RTO: 14.5 % (ref 12.4–15.4)
PERFORMED ON: ABNORMAL
PLATELET # BLD: 311 K/UL (ref 135–450)
PLATELET SLIDE REVIEW: ADEQUATE
PMV BLD AUTO: 8.2 FL (ref 5–10.5)
RBC # BLD: 3.33 M/UL (ref 4.2–5.9)
SLIDE REVIEW: ABNORMAL
WBC # BLD: 10.2 K/UL (ref 4–11)

## 2019-03-17 PROCEDURE — 36573 INSJ PICC RS&I 5 YR+: CPT

## 2019-03-17 PROCEDURE — 2580000003 HC RX 258: Performed by: INTERNAL MEDICINE

## 2019-03-17 PROCEDURE — 97110 THERAPEUTIC EXERCISES: CPT

## 2019-03-17 PROCEDURE — 6370000000 HC RX 637 (ALT 250 FOR IP): Performed by: INTERNAL MEDICINE

## 2019-03-17 PROCEDURE — 99232 SBSQ HOSP IP/OBS MODERATE 35: CPT | Performed by: INTERNAL MEDICINE

## 2019-03-17 PROCEDURE — 6360000002 HC RX W HCPCS: Performed by: INTERNAL MEDICINE

## 2019-03-17 PROCEDURE — 85025 COMPLETE CBC W/AUTO DIFF WBC: CPT

## 2019-03-17 PROCEDURE — 2060000000 HC ICU INTERMEDIATE R&B

## 2019-03-17 PROCEDURE — 2500000003 HC RX 250 WO HCPCS: Performed by: INTERNAL MEDICINE

## 2019-03-17 PROCEDURE — 94762 N-INVAS EAR/PLS OXIMTRY CONT: CPT

## 2019-03-17 PROCEDURE — 94640 AIRWAY INHALATION TREATMENT: CPT

## 2019-03-17 RX ADMIN — ASPIRIN 81 MG 81 MG: 81 TABLET ORAL at 08:05

## 2019-03-17 RX ADMIN — RISPERIDONE 0.25 MG: 0.25 TABLET ORAL at 23:08

## 2019-03-17 RX ADMIN — OXYBUTYNIN CHLORIDE 5 MG: 5 TABLET ORAL at 23:08

## 2019-03-17 RX ADMIN — IPRATROPIUM BROMIDE AND ALBUTEROL SULFATE 1 AMPULE: .5; 3 SOLUTION RESPIRATORY (INHALATION) at 20:03

## 2019-03-17 RX ADMIN — LEVOTHYROXINE SODIUM 75 MCG: 25 TABLET ORAL at 05:20

## 2019-03-17 RX ADMIN — OSELTAMIVIR PHOSPHATE 30 MG: 30 CAPSULE ORAL at 08:05

## 2019-03-17 RX ADMIN — MUPIROCIN: 20 OINTMENT TOPICAL at 08:15

## 2019-03-17 RX ADMIN — SIMVASTATIN 10 MG: 10 TABLET, FILM COATED ORAL at 23:08

## 2019-03-17 RX ADMIN — OXYBUTYNIN CHLORIDE 5 MG: 5 TABLET ORAL at 08:05

## 2019-03-17 RX ADMIN — AZTREONAM 1000 MG: 2 INJECTION, POWDER, LYOPHILIZED, FOR SOLUTION INTRAMUSCULAR; INTRAVENOUS at 05:21

## 2019-03-17 RX ADMIN — ENOXAPARIN SODIUM 40 MG: 40 INJECTION SUBCUTANEOUS at 08:05

## 2019-03-17 RX ADMIN — AZTREONAM 1000 MG: 2 INJECTION, POWDER, LYOPHILIZED, FOR SOLUTION INTRAMUSCULAR; INTRAVENOUS at 23:09

## 2019-03-17 RX ADMIN — INSULIN LISPRO 6 UNITS: 100 INJECTION, SOLUTION INTRAVENOUS; SUBCUTANEOUS at 08:18

## 2019-03-17 RX ADMIN — MAGNESIUM HYDROXIDE 30 ML: 400 SUSPENSION ORAL at 05:21

## 2019-03-17 RX ADMIN — MUPIROCIN: 20 OINTMENT TOPICAL at 23:09

## 2019-03-17 RX ADMIN — AZTREONAM 1000 MG: 2 INJECTION, POWDER, LYOPHILIZED, FOR SOLUTION INTRAMUSCULAR; INTRAVENOUS at 13:39

## 2019-03-17 RX ADMIN — IPRATROPIUM BROMIDE AND ALBUTEROL SULFATE 1 AMPULE: .5; 3 SOLUTION RESPIRATORY (INHALATION) at 13:26

## 2019-03-17 RX ADMIN — IPRATROPIUM BROMIDE AND ALBUTEROL SULFATE 1 AMPULE: .5; 3 SOLUTION RESPIRATORY (INHALATION) at 07:13

## 2019-03-17 RX ADMIN — Medication 10 ML: at 23:08

## 2019-03-17 RX ADMIN — INSULIN LISPRO 9 UNITS: 100 INJECTION, SOLUTION INTRAVENOUS; SUBCUTANEOUS at 16:20

## 2019-03-17 RX ADMIN — RISPERIDONE 0.25 MG: 0.25 TABLET ORAL at 08:06

## 2019-03-17 RX ADMIN — INSULIN GLARGINE 22 UNITS: 100 INJECTION, SOLUTION SUBCUTANEOUS at 08:10

## 2019-03-17 RX ADMIN — INSULIN LISPRO 9 UNITS: 100 INJECTION, SOLUTION INTRAVENOUS; SUBCUTANEOUS at 12:01

## 2019-03-17 ASSESSMENT — PAIN SCALES - GENERAL: PAINLEVEL_OUTOF10: 0

## 2019-03-18 VITALS
SYSTOLIC BLOOD PRESSURE: 132 MMHG | DIASTOLIC BLOOD PRESSURE: 74 MMHG | TEMPERATURE: 98.8 F | OXYGEN SATURATION: 95 % | HEIGHT: 73 IN | HEART RATE: 109 BPM | BODY MASS INDEX: 23.06 KG/M2 | WEIGHT: 174 LBS | RESPIRATION RATE: 17 BRPM

## 2019-03-18 LAB
BASOPHILS ABSOLUTE: 0 K/UL (ref 0–0.2)
BASOPHILS RELATIVE PERCENT: 0.3 %
BLOOD CULTURE, ROUTINE: NORMAL
CULTURE, BLOOD 2: NORMAL
EOSINOPHILS ABSOLUTE: 0.4 K/UL (ref 0–0.6)
EOSINOPHILS RELATIVE PERCENT: 3.4 %
GLUCOSE BLD-MCNC: 157 MG/DL (ref 70–99)
GLUCOSE BLD-MCNC: 216 MG/DL (ref 70–99)
GLUCOSE BLD-MCNC: 304 MG/DL (ref 70–99)
HCT VFR BLD CALC: 29.2 % (ref 40.5–52.5)
HEMOGLOBIN: 9.7 G/DL (ref 13.5–17.5)
LYMPHOCYTES ABSOLUTE: 1.5 K/UL (ref 1–5.1)
LYMPHOCYTES RELATIVE PERCENT: 14.4 %
MCH RBC QN AUTO: 30.3 PG (ref 26–34)
MCHC RBC AUTO-ENTMCNC: 33.2 G/DL (ref 31–36)
MCV RBC AUTO: 91.2 FL (ref 80–100)
MONOCYTES ABSOLUTE: 1.2 K/UL (ref 0–1.3)
MONOCYTES RELATIVE PERCENT: 11.2 %
NEUTROPHILS ABSOLUTE: 7.3 K/UL (ref 1.7–7.7)
NEUTROPHILS RELATIVE PERCENT: 70.7 %
PDW BLD-RTO: 14.8 % (ref 12.4–15.4)
PERFORMED ON: ABNORMAL
PLATELET # BLD: 342 K/UL (ref 135–450)
PMV BLD AUTO: 7.6 FL (ref 5–10.5)
RBC # BLD: 3.21 M/UL (ref 4.2–5.9)
WBC # BLD: 10.4 K/UL (ref 4–11)

## 2019-03-18 PROCEDURE — 97530 THERAPEUTIC ACTIVITIES: CPT

## 2019-03-18 PROCEDURE — 97110 THERAPEUTIC EXERCISES: CPT

## 2019-03-18 PROCEDURE — 6370000000 HC RX 637 (ALT 250 FOR IP): Performed by: INTERNAL MEDICINE

## 2019-03-18 PROCEDURE — 94640 AIRWAY INHALATION TREATMENT: CPT

## 2019-03-18 PROCEDURE — 2500000003 HC RX 250 WO HCPCS: Performed by: INTERNAL MEDICINE

## 2019-03-18 PROCEDURE — 85025 COMPLETE CBC W/AUTO DIFF WBC: CPT

## 2019-03-18 PROCEDURE — 6360000002 HC RX W HCPCS: Performed by: INTERNAL MEDICINE

## 2019-03-18 PROCEDURE — 99239 HOSP IP/OBS DSCHRG MGMT >30: CPT | Performed by: INTERNAL MEDICINE

## 2019-03-18 PROCEDURE — 2580000003 HC RX 258: Performed by: INTERNAL MEDICINE

## 2019-03-18 PROCEDURE — 97535 SELF CARE MNGMENT TRAINING: CPT

## 2019-03-18 RX ADMIN — ENOXAPARIN SODIUM 40 MG: 40 INJECTION SUBCUTANEOUS at 09:21

## 2019-03-18 RX ADMIN — Medication 10 ML: at 05:06

## 2019-03-18 RX ADMIN — AZTREONAM 1000 MG: 2 INJECTION, POWDER, LYOPHILIZED, FOR SOLUTION INTRAMUSCULAR; INTRAVENOUS at 04:53

## 2019-03-18 RX ADMIN — IPRATROPIUM BROMIDE AND ALBUTEROL SULFATE 1 AMPULE: .5; 3 SOLUTION RESPIRATORY (INHALATION) at 11:30

## 2019-03-18 RX ADMIN — INSULIN LISPRO 6 UNITS: 100 INJECTION, SOLUTION INTRAVENOUS; SUBCUTANEOUS at 17:15

## 2019-03-18 RX ADMIN — ASPIRIN 81 MG 81 MG: 81 TABLET ORAL at 09:21

## 2019-03-18 RX ADMIN — INSULIN LISPRO 12 UNITS: 100 INJECTION, SOLUTION INTRAVENOUS; SUBCUTANEOUS at 11:16

## 2019-03-18 RX ADMIN — Medication 10 ML: at 09:21

## 2019-03-18 RX ADMIN — MUPIROCIN: 20 OINTMENT TOPICAL at 11:16

## 2019-03-18 RX ADMIN — IPRATROPIUM BROMIDE AND ALBUTEROL SULFATE 1 AMPULE: .5; 3 SOLUTION RESPIRATORY (INHALATION) at 06:40

## 2019-03-18 RX ADMIN — AZTREONAM 1000 MG: 2 INJECTION, POWDER, LYOPHILIZED, FOR SOLUTION INTRAMUSCULAR; INTRAVENOUS at 14:31

## 2019-03-18 RX ADMIN — OXYBUTYNIN CHLORIDE 5 MG: 5 TABLET ORAL at 09:21

## 2019-03-18 RX ADMIN — LEVOTHYROXINE SODIUM 75 MCG: 25 TABLET ORAL at 05:06

## 2019-03-18 RX ADMIN — RISPERIDONE 0.25 MG: 0.25 TABLET ORAL at 09:21

## 2019-03-18 RX ADMIN — INSULIN GLARGINE 22 UNITS: 100 INJECTION, SOLUTION SUBCUTANEOUS at 09:23

## 2019-03-18 RX ADMIN — INSULIN LISPRO 3 UNITS: 100 INJECTION, SOLUTION INTRAVENOUS; SUBCUTANEOUS at 09:23

## 2019-04-13 PROBLEM — J10.1 INFLUENZA A: Status: RESOLVED | Noted: 2019-03-14 | Resolved: 2019-04-13

## 2019-05-20 ENCOUNTER — HOSPITAL ENCOUNTER (OUTPATIENT)
Dept: GENERAL RADIOLOGY | Age: 84
Discharge: HOME | End: 2019-05-20
Payer: MEDICARE

## 2019-05-20 DIAGNOSIS — J18.9 PNEUMONIA DUE TO INFECTIOUS ORGANISM, UNSPECIFIED LATERALITY, UNSPECIFIED PART OF LUNG: ICD-10-CM

## 2019-05-20 PROCEDURE — 74230 X-RAY XM SWLNG FUNCJ C+: CPT

## 2019-08-08 ENCOUNTER — HOSPITAL ENCOUNTER (EMERGENCY)
Age: 84
Discharge: HOME | End: 2019-08-08
Payer: MEDICARE

## 2019-08-08 VITALS
OXYGEN SATURATION: 98 % | BODY MASS INDEX: 23.03 KG/M2 | DIASTOLIC BLOOD PRESSURE: 83 MMHG | HEIGHT: 72 IN | WEIGHT: 170 LBS | TEMPERATURE: 97 F | RESPIRATION RATE: 17 BRPM | HEART RATE: 79 BPM | SYSTOLIC BLOOD PRESSURE: 126 MMHG

## 2019-08-08 DIAGNOSIS — T18.108A FOREIGN BODY IN ESOPHAGUS, INITIAL ENCOUNTER: Primary | ICD-10-CM

## 2019-08-08 LAB
A/G RATIO: 1.3 (ref 1.1–2.2)
ALBUMIN SERPL-MCNC: 3.9 G/DL (ref 3.4–5)
ALP BLD-CCNC: 125 U/L (ref 40–129)
ALT SERPL-CCNC: 25 U/L (ref 10–40)
ANION GAP SERPL CALCULATED.3IONS-SCNC: 11 MMOL/L (ref 3–16)
AST SERPL-CCNC: 40 U/L (ref 15–37)
BASOPHILS ABSOLUTE: 0.1 K/UL (ref 0–0.2)
BASOPHILS RELATIVE PERCENT: 0.5 %
BILIRUB SERPL-MCNC: 0.9 MG/DL (ref 0–1)
BUN BLDV-MCNC: 32 MG/DL (ref 7–20)
CALCIUM SERPL-MCNC: 9.4 MG/DL (ref 8.3–10.6)
CHLORIDE BLD-SCNC: 99 MMOL/L (ref 99–110)
CO2: 29 MMOL/L (ref 21–32)
CREAT SERPL-MCNC: 1.2 MG/DL (ref 0.8–1.3)
EOSINOPHILS ABSOLUTE: 0.4 K/UL (ref 0–0.6)
EOSINOPHILS RELATIVE PERCENT: 4.2 %
GFR AFRICAN AMERICAN: >60
GFR NON-AFRICAN AMERICAN: 56
GLOBULIN: 3 G/DL
GLUCOSE BLD-MCNC: 86 MG/DL (ref 70–99)
GLUCOSE BLD-MCNC: 93 MG/DL (ref 70–99)
HCT VFR BLD CALC: 33.6 % (ref 40.5–52.5)
HEMOGLOBIN: 11.2 G/DL (ref 13.5–17.5)
INR BLD: 1.09 (ref 0.86–1.14)
LYMPHOCYTES ABSOLUTE: 1.5 K/UL (ref 1–5.1)
LYMPHOCYTES RELATIVE PERCENT: 14.7 %
MCH RBC QN AUTO: 30.9 PG (ref 26–34)
MCHC RBC AUTO-ENTMCNC: 33.4 G/DL (ref 31–36)
MCV RBC AUTO: 92.4 FL (ref 80–100)
MONOCYTES ABSOLUTE: 0.9 K/UL (ref 0–1.3)
MONOCYTES RELATIVE PERCENT: 9.2 %
NEUTROPHILS ABSOLUTE: 7.3 K/UL (ref 1.7–7.7)
NEUTROPHILS RELATIVE PERCENT: 71.4 %
PDW BLD-RTO: 14.9 % (ref 12.4–15.4)
PERFORMED ON: NORMAL
PLATELET # BLD: 357 K/UL (ref 135–450)
PMV BLD AUTO: 7.3 FL (ref 5–10.5)
POTASSIUM REFLEX MAGNESIUM: 5 MMOL/L (ref 3.5–5.1)
PROTHROMBIN TIME: 12.4 SEC (ref 9.8–13)
RBC # BLD: 3.63 M/UL (ref 4.2–5.9)
SODIUM BLD-SCNC: 139 MMOL/L (ref 136–145)
TOTAL PROTEIN: 6.9 G/DL (ref 6.4–8.2)
WBC # BLD: 10.2 K/UL (ref 4–11)

## 2019-08-08 PROCEDURE — 80053 COMPREHEN METABOLIC PANEL: CPT

## 2019-08-08 PROCEDURE — 99153 MOD SED SAME PHYS/QHP EA: CPT | Performed by: INTERNAL MEDICINE

## 2019-08-08 PROCEDURE — 6360000002 HC RX W HCPCS: Performed by: NURSE PRACTITIONER

## 2019-08-08 PROCEDURE — 2709999900 HC NON-CHARGEABLE SUPPLY: Performed by: INTERNAL MEDICINE

## 2019-08-08 PROCEDURE — 96375 TX/PRO/DX INJ NEW DRUG ADDON: CPT

## 2019-08-08 PROCEDURE — 2500000003 HC RX 250 WO HCPCS: Performed by: NURSE PRACTITIONER

## 2019-08-08 PROCEDURE — 99152 MOD SED SAME PHYS/QHP 5/>YRS: CPT | Performed by: INTERNAL MEDICINE

## 2019-08-08 PROCEDURE — 85025 COMPLETE CBC W/AUTO DIFF WBC: CPT

## 2019-08-08 PROCEDURE — 2720000010 HC SURG SUPPLY STERILE: Performed by: INTERNAL MEDICINE

## 2019-08-08 PROCEDURE — C9113 INJ PANTOPRAZOLE SODIUM, VIA: HCPCS | Performed by: NURSE PRACTITIONER

## 2019-08-08 PROCEDURE — 6360000002 HC RX W HCPCS: Performed by: INTERNAL MEDICINE

## 2019-08-08 PROCEDURE — 2580000003 HC RX 258: Performed by: INTERNAL MEDICINE

## 2019-08-08 PROCEDURE — 3609012900 HC EGD FOREIGN BODY REMOVAL: Performed by: INTERNAL MEDICINE

## 2019-08-08 PROCEDURE — 96374 THER/PROPH/DIAG INJ IV PUSH: CPT

## 2019-08-08 PROCEDURE — 85610 PROTHROMBIN TIME: CPT

## 2019-08-08 PROCEDURE — 99284 EMERGENCY DEPT VISIT MOD MDM: CPT

## 2019-08-08 RX ORDER — MIDAZOLAM HYDROCHLORIDE 5 MG/ML
INJECTION INTRAMUSCULAR; INTRAVENOUS PRN
Status: DISCONTINUED | OUTPATIENT
Start: 2019-08-08 | End: 2019-08-08 | Stop reason: ALTCHOICE

## 2019-08-08 RX ORDER — SODIUM CHLORIDE, SODIUM LACTATE, POTASSIUM CHLORIDE, CALCIUM CHLORIDE 600; 310; 30; 20 MG/100ML; MG/100ML; MG/100ML; MG/100ML
INJECTION, SOLUTION INTRAVENOUS CONTINUOUS PRN
Status: COMPLETED | OUTPATIENT
Start: 2019-08-08 | End: 2019-08-08

## 2019-08-08 RX ORDER — FENTANYL CITRATE 50 UG/ML
INJECTION, SOLUTION INTRAMUSCULAR; INTRAVENOUS PRN
Status: DISCONTINUED | OUTPATIENT
Start: 2019-08-08 | End: 2019-08-08 | Stop reason: ALTCHOICE

## 2019-08-08 RX ORDER — PANTOPRAZOLE SODIUM 40 MG/10ML
80 INJECTION, POWDER, LYOPHILIZED, FOR SOLUTION INTRAVENOUS ONCE
Status: COMPLETED | OUTPATIENT
Start: 2019-08-08 | End: 2019-08-08

## 2019-08-08 RX ADMIN — GLUCAGON HYDROCHLORIDE 1 MG: 1 INJECTION, POWDER, FOR SOLUTION INTRAMUSCULAR; INTRAVENOUS; SUBCUTANEOUS at 18:21

## 2019-08-08 RX ADMIN — PANTOPRAZOLE SODIUM 80 MG: 40 INJECTION, POWDER, FOR SOLUTION INTRAVENOUS at 18:21

## 2019-08-08 ASSESSMENT — ENCOUNTER SYMPTOMS
ABDOMINAL PAIN: 0
SHORTNESS OF BREATH: 0

## 2019-08-08 ASSESSMENT — PAIN - FUNCTIONAL ASSESSMENT: PAIN_FUNCTIONAL_ASSESSMENT: 0-10

## 2019-08-08 NOTE — ED PROVIDER NOTES
SIMVASTATIN (ZOCOR) 10 MG TABLET    Take 10 mg by mouth nightly    VITAMIN B-12 (CYANOCOBALAMIN) 1000 MCG TABLET    Take 1,000 mcg by mouth 2 times daily          ALLERGIES     Ciprofloxacin; Pcn [penicillins]; Sulfa antibiotics; and Ceftriaxone    FAMILY HISTORY     History reviewed. No pertinent family history.        SOCIAL HISTORY       Social History     Socioeconomic History    Marital status:      Spouse name: None    Number of children: None    Years of education: None    Highest education level: None   Occupational History    None   Social Needs    Financial resource strain: None    Food insecurity:     Worry: None     Inability: None    Transportation needs:     Medical: None     Non-medical: None   Tobacco Use    Smoking status: Former Smoker     Types: Cigarettes    Smokeless tobacco: Never Used    Tobacco comment: quit date unknown   Substance and Sexual Activity    Alcohol use: No    Drug use: No    Sexual activity: Not Currently   Lifestyle    Physical activity:     Days per week: None     Minutes per session: None    Stress: None   Relationships    Social connections:     Talks on phone: None     Gets together: None     Attends Taoist service: None     Active member of club or organization: None     Attends meetings of clubs or organizations: None     Relationship status: None    Intimate partner violence:     Fear of current or ex partner: None     Emotionally abused: None     Physically abused: None     Forced sexual activity: None   Other Topics Concern    None   Social History Narrative    None       SCREENINGS    [place] Coma Scale  Eye Opening: Spontaneous  Best Verbal Response: Oriented  Best Motor Response: Obeys commands  [place] Coma Scale Score: 15        PHYSICAL EXAM  (up to 7 for level 4, 8 or more for level 5)     ED Triage Vitals [08/08/19 1727]   BP Temp Temp Source Pulse Resp SpO2 Height Weight   133/63 98.6 °F (37 °C) Oral 70 18 97 % 6' (1.829 m) 170 lb discharged home with all questions answered. Nursing home was notified by the ER nurse that he had large pieces of ham that were not chewed in his esophagus and it was recommended to them that he have either puréed food or small pieces that he is able to ingest better. The patient tolerated their visit well. I have evaluated thispatient. My supervising physician was available for consultation. The patient and / or the family were informed of the results of any tests, a time was given to answer questions, a plan was proposed and they agreed Orlin Julio. FINAL IMPRESSION      1.  Foreign body in esophagus, initial encounter          DISPOSITION/PLAN   DISPOSITION        PATIENT REFERRED TO:  Audra Killian MD  [address] [address]  [phone]    Schedule an appointment as soon as possible for a visit in 2 days  for re-evaluation    [facility]  [address] [facility]  [phone]    If symptoms worsen    Follow-up with the gastroenterology doctor as previously directed            DISCHARGE MEDICATIONS:  New Prescriptions    No medications on file       DISCONTINUED MEDICATIONS:  Discontinued Medications    No medications on file              (Please note that portions of this note were completed with a voice recognition program.  Efforts were made to edit the dictations but occasionally words are mis-transcribed.)    ROSALVA Darnell CNP (electronically signed)         ROSALVA Darnell CNP  08/08/19 1797

## 2019-08-09 NOTE — ED NOTES
Pt returned from endo. Report received from [facility] Pt comfortable in bed. Call light in reach. Will continue to monitor.       Rush Stevenson, [facility]  08/08/19 7573

## 2023-01-31 NOTE — PROGRESS NOTES
[facility] est care visit with Dr. Margaret Ruiz to affected ankle while standing/walking  Ice to affected ankle 2-3x/d  Elevate affected extremity while lying/sitting  Report to ER immediately if symptoms worsen or persist   Hospitalist Progress Note      PCP: Sujit Banuelos MD    Date of Admission: 9/19/2018        Subjective:     Appears somewhat more confused this am. He has no complaints, no fevers      Medications:  Reviewed    Infusion Medications    dextrose       Scheduled Medications    meropenem  500 mg Intravenous Q8H    levothyroxine  75 mcg Oral Daily    aspirin  81 mg Oral Daily    simvastatin  10 mg Oral Nightly    oxybutynin  5 mg Oral TID    potassium chloride  20 mEq Oral Daily    lisinopril  10 mg Oral Daily    furosemide  20 mg Oral Daily    insulin glargine  25 Units Subcutaneous Nightly    sodium chloride flush  10 mL Intravenous 2 times per day    enoxaparin  40 mg Subcutaneous Daily    insulin lispro  0-12 Units Subcutaneous TID WC    insulin lispro  0-6 Units Subcutaneous Nightly    polyethylene glycol  17 g Oral Daily     PRN Meds: polyethyl glycol-propyl glycol 0.4-0.3 %, sodium chloride flush, magnesium hydroxide, ondansetron, acetaminophen, albuterol, glucose, dextrose, glucagon (rDNA), dextrose      Intake/Output Summary (Last 24 hours) at 09/22/18 1343  Last data filed at 09/22/18 1243   Gross per 24 hour   Intake              840 ml   Output             3050 ml   Net            -2210 ml       Physical Exam Performed:    BP (!) 156/93   Pulse 90   Temp 97.8 °F (36.6 °C) (Oral)   Resp 18   Ht 5' 11.5\" (1.816 m)   Wt 170 lb 10.2 oz (77.4 kg)   SpO2 96%   BMI 23.47 kg/m²     General appearance: No apparent distress, appears stated age and cooperative. HEENT: Pupils equal, round, and reactive to light. Conjunctivae/corneas clear. Neck: Supple, with full range of motion. No jugular venous distention. Trachea midline. Respiratory:  Normal respiratory effort. Clear to auscultation, bilaterally without Rales/Wheezes/Rhonchi. Cardiovascular: Regular rate and rhythm with normal S1/S2 without murmurs, rubs or gallops.   Abdomen: Soft, non-tender, non-distended with normal bowel pharyngeal dysphagia-on modifed diet  -essential HTN-uncontrolled  -hypothyroidism-on synthroid  -hx CVA-- on asa,statin    Plan  - c/w meropenem-managed by [facility] Will be discharged on oral abx  - c/w supportive care  - PT/OT  -resume lisinopril  -dc planning          DVT Prophylaxis:  Lovenox  Diet: Dietary Nutrition Supplements: Diabetic Oral Supplement  DIET CARB CONTROL;  Dysphagia I Pureed  Code Status: DNR-CCA    PT/OT Eval Status:  ongoing    Dispo -  To  ECF hopefully within 24 hrs    Poncho Leger MD lito

## (undated) DEVICE — THE DISPOSABLE ROTH NET PLATINUM FOOD BOLUS RETRIEVAL DEVICE IS USED IN THE ENDOSCOPIC RETRIEVAL FOOD BOLUS.: Brand: ROTH NET PLATINUM

## (undated) DEVICE — ENDO CARRY-ON PROCEDURE KIT INCLUDES SUCTION TUBING, LUBRICANT, GAUZE, BIOHAZARD STICKER, TRANSPORT PAD AND INTERCEPT BEDSIDE KIT.: Brand: ENDO CARRY-ON PROCEDURE KIT

## (undated) DEVICE — CONMED SCOPE SAVER BITE BLOCK, 20X27 MM: Brand: SCOPE SAVER

## (undated) DEVICE — RETRIEVER ENDOSCP L230CM DIA2.5MM NET W3XL5.5CM MIN WRK CHN